# Patient Record
Sex: FEMALE | Race: WHITE | NOT HISPANIC OR LATINO | Employment: OTHER | URBAN - METROPOLITAN AREA
[De-identification: names, ages, dates, MRNs, and addresses within clinical notes are randomized per-mention and may not be internally consistent; named-entity substitution may affect disease eponyms.]

---

## 2017-10-30 ENCOUNTER — ALLSCRIPTS OFFICE VISIT (OUTPATIENT)
Dept: OTHER | Facility: OTHER | Age: 77
End: 2017-10-30

## 2017-10-30 LAB
BILIRUB UR QL STRIP: NEGATIVE
CLARITY UR: NORMAL
COLOR UR: YELLOW
GLUCOSE (HISTORICAL): NEGATIVE
HGB UR QL STRIP.AUTO: NEGATIVE
KETONES UR STRIP-MCNC: NEGATIVE MG/DL
LEUKOCYTE ESTERASE UR QL STRIP: NEGATIVE
NITRITE UR QL STRIP: NEGATIVE
PH UR STRIP.AUTO: 7.5 [PH]
PROT UR STRIP-MCNC: NEGATIVE MG/DL
SP GR UR STRIP.AUTO: 1.01
UROBILINOGEN UR QL STRIP.AUTO: 0.2

## 2017-11-13 ENCOUNTER — GENERIC CONVERSION - ENCOUNTER (OUTPATIENT)
Dept: OTHER | Facility: OTHER | Age: 77
End: 2017-11-13

## 2017-11-16 ENCOUNTER — GENERIC CONVERSION - ENCOUNTER (OUTPATIENT)
Dept: OTHER | Facility: OTHER | Age: 77
End: 2017-11-16

## 2017-11-20 ENCOUNTER — ALLSCRIPTS OFFICE VISIT (OUTPATIENT)
Dept: OTHER | Facility: OTHER | Age: 77
End: 2017-11-20

## 2017-11-20 DIAGNOSIS — M85.80 OTHER SPECIFIED DISORDERS OF BONE DENSITY AND STRUCTURE, UNSPECIFIED SITE (CODE): ICD-10-CM

## 2017-11-20 DIAGNOSIS — N89.8 OTHER SPECIFIED NONINFLAMMATORY DISORDERS OF VAGINA (CODE): ICD-10-CM

## 2017-11-21 LAB
BACTERIA UR QL AUTO: NEGATIVE
CLUE CELL (HISTORICAL): NEGATIVE
HYPHAL YEAST (HISTORICAL): NEGATIVE
KOH PREP (HISTORICAL): NEGATIVE
TRICHOMONAS (HISTORICAL): NEGATIVE
YEAST (HISTORICAL): NEGATIVE

## 2017-11-22 ENCOUNTER — GENERIC CONVERSION - ENCOUNTER (OUTPATIENT)
Dept: OTHER | Facility: OTHER | Age: 77
End: 2017-11-22

## 2017-11-22 NOTE — PROGRESS NOTES
Assessment    1  Vaginal discharge (623 5) (N89 8)   2  Osteopenia (733 90) (M85 80)    Plan  Osteopenia    · * DXA BONE DENSITY SPINE HIP AND PELVIS; Status:Active; Requested for:20Nov2017;   Perform:[facility]; WXC:[id number]; Last Updated By:Elza Fitzgerald; 11/20/2017 3:20:00 PM;Ordered;Ordered By:Kemal Escalera;  Vaginal discharge    · * US PELVIS COMPLETE (TRANSABDOMINAL AND TRANSVAGINAL); Status:Active; Requested for:20Nov2017;    Perform:[facility]; AQO:[id number]; Last Updated By:Elza Fitzgerald; 11/20/2017 3:18:20 PM;Ordered; For:Vaginal discharge; Ordered By:Christina Escalera;   · 97 Susanna Ochoa; Status:Complete;   Done: 20FAT1578 09:59PM   Performed: In Office; KDW:53QVB3493;TAVFXRS; Today;discharge; Ordered By:Christina Escalera;    Discussion/Summary  Discussion Summary:   Vaginal discharge-no sign of infection, she denies any bleeding but the nurse note states that she did have bleeding, will check pelvic ultrasound to see what her endometrium measures  is due for a DEXA this was ordered  Goals and Barriers: The patient has the current Goals: See discussion  The patent has the current Barriers: None  History of Present Illness  HPI: Pt here for evaluation of discharge, it started a few weeks ago and it was clear or yellow, no vaginal bleeding,denies that it was pink, tan or brown, she denies any vaginal odor or itching  Today she has no discharge  A little over a year ago she had an ultrasound for bloating and her endometrium was 5 3 mm and there was endometrial fluid noted  An endometrial biopsy was done and showed scantStrips of superficial inactive endometrium, it was a scant biopsy  She was not having bleeding  Review of Systems  Focused-Female:  Constitutional: No fever, no chills, feels well, no tiredness, no recent weight gain or loss  ENT: no ear ache, no loss of hearing, no nosebleeds or nasal discharge, no sore throat or hoarseness    Cardiovascular: no complaints of slow or fast heart rate, no chest pain, no palpitations, no leg claudication or lower extremity edema  Respiratory: no complaints of shortness of breath, no wheezing, no dyspnea on exertion, no orthopnea or PND  Breasts: no complaints of breast pain, breast lump or nipple discharge  Gastrointestinal: no complaints of abdominal pain, no constipation, no nausea or diarrhea, no vomiting, no bloody stools  Genitourinary: no complaints of dysuria, no incontinence, no pelvic pain, no dysmenorrhea, no vaginal discharge or abnormal vaginal bleeding  Musculoskeletal: no complaints of arthralgia, no myalgia, no joint swelling or stiffness, no limb pain or swelling  Integumentary: no complaints of skin rash or lesion, no itching or dry skin, no skin wounds  Neurological: no complaints of headache, no confusion, no numbness or tingling, no dizziness or fainting  ROS Reviewed:   ROS reviewed  Active Problems  1  Bloating (787 3) (R14 0)   2  Encounter for screening for malignant neoplasm of cervix (V76 2) (Z12 4)   3  Denied: History of self breast exam   4  Menopause (627 2) (Z78 0)   5  Osteopenia (733 90) (M85 80)   6  Thickened endometrium (793 5) (R93 8)   7  Urgency of urination (788 63) (R39 15)   8  Visit for routine gyn exam ( 31) (Z01 419)   9  Visit for screening mammogram (V76 12) (Z12 31)    Past Medical History  1  History of  1 (V22 0) (Z34 00)   2  Denied: History of self breast exam  Active Problems And Past Medical History Reviewed: The active problems and past medical history were reviewed and updated today  Surgical History  1  History of Appendectomy  Surgical History Reviewed: The surgical history was reviewed and updated today  Family History  Mother    1  Family history of diabetes mellitus (V18 0) (Z83 3)  Father    2  Family history of Alzheimer's disease (V17 2) (Z82 0)  Brother    3  Family history of malignant neoplasm (V16 9) (Z80 9)  Family History Reviewed:    The family history was reviewed and updated today  Social History     · Denied: History of Alcohol use   · Always uses seat belt   · Caffeine use (V49 89) (F15 90)   · Denied: History of Drug use   ·    · Yazidi   · Never a smoker   · One child  Social History Reviewed: The social history was reviewed and updated today  Current Meds   1  Alendronate Sodium 35 MG Oral Tablet; TAKE 1 TABLET WEEKLY ON AN EMPTY STOMACH 30-60 MINUTES BEFORE BREAKFAST WITH 8-12 OUNCES OF WATER  DO NOT LIE DOWN AFTER TAKING PILL; Therapy: 68SBZ4703 to (Janneth Gutierrez)  Requested for: 74SCS5317; Last Rx:09Nov2017 Ordered   2  AmLODIPine Besylate 5 MG Oral Tablet; Therapy: (Recorded:30Oct2017) to Recorded   3  Calcium + D CHEW; Therapy: (XFIZCZPH:48MGW8519) to Recorded   4  Depakote 500 MG Oral Tablet Delayed Release; Therapy: (Recorded:01Oct2015) to Recorded   5  Diphenoxylate-Atropine 2 5-0 025 MG Oral Tablet; Therapy: [phone]) to Recorded   6  Folic Acid 1 MG Oral Tablet; Therapy: (Recorded:30Oct2017) to Recorded   7  Furosemide 20 MG Oral Tablet; Therapy: [phone]) to Recorded   8  LORazepam 0 5 MG Oral Tablet; Therapy: (Recorded:30Oct2017) to Recorded   9  Melatonin 5 MG Oral Tablet; Therapy: [phone]) to Recorded   10  Metoprolol Tartrate 50 MG Oral Tablet; Therapy: [phone] [phone]) to Recorded   11  Oxybutynin Chloride ER 10 MG Oral Tablet Extended Release 24 Hour; Take 1 tablet  daily; Therapy: 27ZYN6660 to [phone])  Requested for: 63RJM9101; Last  Rx:30Oct2017 Ordered   12  Oxycodone-Acetaminophen 5-325 MG Oral Tablet; Therapy: (Recorded:30Oct2017) to Recorded   13  Synthroid 50 MCG Oral Tablet; Therapy: [phone] [phone]) to Recorded   14  Temazepam 15 MG Oral Capsule; Therapy: [phone] [phone]) to Recorded   15  Vitamin D 1000 UNIT Oral Tablet; Therapy: [phone] 2882-8049) to Recorded  Medication List Reviewed:    The medication list was reviewed and updated today  Allergies  1  Penicillins    Vitals  Vital Signs    Recorded: 42DXP1941 20:51CA   Systolic 165, LUE, Sitting   Diastolic 62, LUE, Sitting   Height 5 ft 4 in   Weight 126 lb 8 oz   BMI Calculated 21 71   BSA Calculated 1 61   LMP POSTMENOPAUSAL       Physical Exam   Genitourinary  External genitalia: Normal and no lesions appreciated  Vagina: Normal, no lesions or dryness appreciated  -- No discharge noted  Urethra: Normal    Urethral meatus: Normal    Bladder: Normal, soft, non-tender and no prolapse or masses appreciated  Cervix: Normal, no palpable masses  Uterus: Normal, non-tender, not enlarged, and no palpable masses  Adnexa/parametria: Normal, non-tender and no fullness or masses appreciated  Future Appointments    Date/Time Provider Specialty Site   12/21/2017 02:00 PM Deja Singletary, [address] [place] [address]       Signatures   Electronically signed by :  Tamica David DO; Nov 21 2017 10:02PM EST                       (Author)

## 2017-12-07 ENCOUNTER — ALLSCRIPTS OFFICE VISIT (OUTPATIENT)
Dept: OTHER | Facility: OTHER | Age: 77
End: 2017-12-07

## 2017-12-07 LAB
BILIRUB UR QL STRIP: NORMAL
CLARITY UR: NORMAL
COLOR UR: YELLOW
GLUCOSE (HISTORICAL): NORMAL
HGB UR QL STRIP.AUTO: NORMAL
KETONES UR STRIP-MCNC: NORMAL MG/DL
LEUKOCYTE ESTERASE UR QL STRIP: NORMAL
NITRITE UR QL STRIP: NORMAL
PH UR STRIP.AUTO: 7 [PH]
PROT UR STRIP-MCNC: NORMAL MG/DL
SP GR UR STRIP.AUTO: 1.02
UROBILINOGEN UR QL STRIP.AUTO: 0.2

## 2017-12-18 ENCOUNTER — ALLSCRIPTS OFFICE VISIT (OUTPATIENT)
Dept: OTHER | Facility: OTHER | Age: 77
End: 2017-12-18

## 2018-01-12 NOTE — MISCELLANEOUS
Message   Date: 26 May 2016 11:42 AM EST, Recorded By: Melinda Roberts For: Georgina Mcnamara, Self   Phone: [phone] (Home)   Reason: Other   Pt talked with Twyla Henley    her call was lost, but Twyla Henley said that pt wanted a written paper with her dx on it and what she had    had Twyla Henley approve it and it was sent to pts house and scanned in case pt loses it again           Active Problems    1  Bloating (787 3) (R14 0)   2  Encounter for screening for malignant neoplasm of cervix (V76 2) (Z12 4)   3  Denied: History of self breast exam   4  Menopause (627 2) (Z78 0)   5  Osteopenia (733 90) (M85 80)   6  Visit for routine gyn exam (V72 31) (Z01 419)   7  Visit for screening mammogram (V76 12) (Z12 31)    Current Meds   1  Alendronate Sodium 35 MG Oral Tablet; TAKE 1 TABLET WEEKLY ON AN EMPTY   STOMACH 30-60 MINUTES BEFORE BREAKFAST WITH 8-12 OUNCES OF WATER  DO NOT LIE DOWN AFTER TAKING PILL; Therapy: 61NGU0645 to (Evaluate:09Jan2017)  Requested for: 91OUA6560; Last   Rx:15Jan2016 Ordered   2  Calcium + D CHEW;   Therapy: (Recorded:01Oct2015) to Recorded   3  Depakote 250 MG Oral Tablet Delayed Release (Divalproex Sodium); Therapy: (Recorded:01Oct2015) to Recorded   4  Depakote 500 MG Oral Tablet Delayed Release (Divalproex Sodium); Therapy: (Recorded:01Oct2015) to Recorded   5  Diphenoxylate-Atropine 2 5-0 025 MG Oral Tablet; Therapy: ([phone]) to Recorded   6  Furosemide 20 MG Oral Tablet; Therapy: (691 333 981) to Recorded   7  Melatonin 5 MG Oral Tablet; Therapy: (691 333 981) to Recorded   8  Metoprolol Tartrate 50 MG Oral Tablet; Therapy: (691 333 981) to Recorded   9  Synthroid 50 MCG Oral Tablet (Levothyroxine Sodium); Therapy: (691 333 981) to Recorded   10  Temazepam 15 MG Oral Capsule; Therapy: ([phone]) to Recorded   11  Vitamin D 1000 UNIT Oral Tablet;     Therapy: (Recorded:01Oct2015) to Recorded    Allergies    1   Penicillins    Signatures   Electronically signed by : Elle Meza, ; May 26 2016 11:43AM EST                       (Author)

## 2018-01-12 NOTE — MISCELLANEOUS
Message   Recorded as Task   Date: 10/02/2016 01:48 PM, Created By: Kim Cabrales   Task Name: Go to Result   Assigned To: Rafy Mason   Regarding Patient: Crispin Rowland, Status: Active   CommentGlennette Pean - 02 Oct 2016 1:48 PM     TASK CREATED  please let pt know her emb was normal   Pt informed  Active Problems    1  Bloating (787 3) (R14 0)   2  Encounter for screening for malignant neoplasm of cervix (V76 2) (Z12 4)   3  Denied: History of self breast exam   4  Menopause (627 2) (Z78 0)   5  Osteopenia (733 90) (M85 80)   6  Thickened endometrium (793 5) (R93 8)   7  Visit for routine gyn exam (V72 31) (Z01 419)   8  Visit for screening mammogram (V76 12) (Z12 31)    Current Meds   1  Alendronate Sodium 35 MG Oral Tablet; TAKE 1 TABLET WEEKLY ON AN EMPTY   STOMACH 30-60 MINUTES BEFORE BREAKFAST WITH 8-12 OUNCES OF WATER  DO NOT LIE DOWN AFTER TAKING PILL; Therapy: 56LDZ5827 to (Evaluate:09Jan2017)  Requested for: 05WEW2383; Last   Rx:15Jan2016 Ordered   2  Calcium + D CHEW;   Therapy: (Recorded:01Oct2015) to Recorded   3  Depakote 250 MG Oral Tablet Delayed Release (Divalproex Sodium); Therapy: (Recorded:01Oct2015) to Recorded   4  Depakote 500 MG Oral Tablet Delayed Release (Divalproex Sodium); Therapy: (Recorded:01Oct2015) to Recorded   5  Diphenoxylate-Atropine 2 5-0 025 MG Oral Tablet; Therapy: (29-29-69-33) to Recorded   6  Furosemide 20 MG Oral Tablet; Therapy: (29-29-69-33) to Recorded   7  Melatonin 5 MG Oral Tablet; Therapy: (29-29-69-33) to Recorded   8  Metoprolol Tartrate 50 MG Oral Tablet; Therapy: (29-29-69-33) to Recorded   9  Synthroid 50 MCG Oral Tablet (Levothyroxine Sodium); Therapy: (29-29-69-33) to Recorded   10  Temazepam 15 MG Oral Capsule; Therapy: (29-29-69-33) to Recorded   11  Vitamin D 1000 UNIT Oral Tablet; Therapy: (Recorded:01Oct2015) to Recorded    Allergies    1  Penicillins    Signatures   Electronically signed by : Alessandro Aguilar, ; Oct  3 2016  9:19AM EST                       (Author)

## 2018-01-13 VITALS
DIASTOLIC BLOOD PRESSURE: 62 MMHG | BODY MASS INDEX: 21.6 KG/M2 | HEIGHT: 64 IN | WEIGHT: 126.5 LBS | SYSTOLIC BLOOD PRESSURE: 104 MMHG

## 2018-01-13 NOTE — MISCELLANEOUS
Message   Date: 22 Nov 2017 11:29 AM EST, Recorded By: Karla Worley For: Sahara Berman May, Self   Phone: [phone] (Home)   Reason: Other   per pt request  faxed over to Dr Elizabeth Soni pts mammo results    they were faxed to [phone]    Active Problems    1  Bloating (787 3) (R14 0)   2  Encounter for screening for malignant neoplasm of cervix (V76 2) (Z12 4)   3  Denied: History of self breast exam   4  Menopause (627 2) (Z78 0)   5  Osteopenia (733 90) (M85 80)   6  Thickened endometrium (793 5) (R93 8)   7  Urgency of urination (788 63) (R39 15)   8  Vaginal discharge (623 5) (N89 8)   9  Visit for routine gyn exam (V72 31) (Z01 419)   10  Visit for screening mammogram (V76 12) (Z12 31)    Current Meds   1  Alendronate Sodium 35 MG Oral Tablet; TAKE 1 TABLET WEEKLY ON AN EMPTY   STOMACH 30-60 MINUTES BEFORE BREAKFAST WITH 8-12 OUNCES OF WATER  DO NOT LIE DOWN AFTER TAKING PILL; Therapy: 63RTC5631 to (Justin Wood)  Requested for: 25FVR3835; Last   Rx:09Nov2017 Ordered   2  AmLODIPine Besylate 5 MG Oral Tablet; Therapy: (Recorded:30Oct2017) to Recorded   3  Calcium + D CHEW;   Therapy: (SKPCCVRS:75OHA8523) to Recorded   4  Depakote 500 MG Oral Tablet Delayed Release (Divalproex Sodium); Therapy: (Recorded:01Oct2015) to Recorded   5  Diphenoxylate-Atropine 2 5-0 025 MG Oral Tablet; Therapy: [phone]) to Recorded   6  Folic Acid 1 MG Oral Tablet; Therapy: (Recorded:30Oct2017) to Recorded   7  Furosemide 20 MG Oral Tablet; Therapy: [phone] [phone]) to Recorded   8  LORazepam 0 5 MG Oral Tablet; Therapy: (Recorded:30Oct2017) to Recorded   9  Melatonin 5 MG Oral Tablet; Therapy: [phone] [phone]) to Recorded   10  Metoprolol Tartrate 50 MG Oral Tablet; Therapy: [phone] [phone]) to Recorded   11  Oxybutynin Chloride ER 10 MG Oral Tablet Extended Release 24 Hour; Take 1 tablet    daily;     Therapy: 65IID1397 to (AYHTGPHP:53FOP4498)  Requested for: 13GTH5911; Last    Rx:30Oct2017 Ordered   12  Oxycodone-Acetaminophen 5-325 MG Oral Tablet; Therapy: (Recorded:30Oct2017) to Recorded   13  Synthroid 50 MCG Oral Tablet (Levothyroxine Sodium); Therapy: ([phone]) to Recorded   14  Temazepam 15 MG Oral Capsule; Therapy: ([phone]) to Recorded   15  Vitamin D 1000 UNIT Oral Tablet; Therapy: (Recorded:01Oct2015) to Recorded    Allergies    1   Penicillins    Signatures   Electronically signed by : Mariah Rodriguez, ; Nov 22 2017 11:30AM EST                       (Author)

## 2018-01-15 NOTE — MISCELLANEOUS
Message   Date: 16 Nov 2017 10:07 AM EST, Recorded By: Deo Shaw For: Tuyet Huntley, Self   Phone: [phone] (Home)   Reason: Medical Complaint   pt is having post-menopausal bleeding    pt will schedule an appt           Active Problems    1  Bloating (787 3) (R14 0)   2  Encounter for screening for malignant neoplasm of cervix (V76 2) (Z12 4)   3  Denied: History of self breast exam   4  Menopause (627 2) (Z78 0)   5  Osteopenia (733 90) (M85 80)   6  Thickened endometrium (793 5) (R93 8)   7  Urgency of urination (788 63) (R39 15)   8  Visit for routine gyn exam (V72 31) (Z01 419)   9  Visit for screening mammogram (V76 12) (Z12 31)    Current Meds   1  Alendronate Sodium 35 MG Oral Tablet; TAKE 1 TABLET WEEKLY ON AN EMPTY   STOMACH 30-60 MINUTES BEFORE BREAKFAST WITH 8-12 OUNCES OF WATER  DO NOT LIE DOWN AFTER TAKING PILL; Therapy: 04JLO0566 to (Jonny Causey)  Requested for: 22EFZ6655; Last   Rx:09Nov2017 Ordered   2  AmLODIPine Besylate 5 MG Oral Tablet; Therapy: (Recorded:30Oct2017) to Recorded   3  Calcium + D CHEW;   Therapy: (ZENJPQRD:44TEV3725) to Recorded   4  Depakote 500 MG Oral Tablet Delayed Release (Divalproex Sodium); Therapy: (Recorded:01Oct2015) to Recorded   5  Diphenoxylate-Atropine 2 5-0 025 MG Oral Tablet; Therapy: [phone] [phone]) to Recorded   6  Folic Acid 1 MG Oral Tablet; Therapy: (Recorded:05Fcu7244) to Recorded   7  Furosemide 20 MG Oral Tablet; Therapy: [phone]) to Recorded   8  LORazepam 0 5 MG Oral Tablet; Therapy: (Recorded:30Oct2017) to Recorded   9  Melatonin 5 MG Oral Tablet; Therapy: [phone] [phone]) to Recorded   10  Metoprolol Tartrate 50 MG Oral Tablet; Therapy: [phone]) to Recorded   11  Oxybutynin Chloride ER 10 MG Oral Tablet Extended Release 24 Hour; Take 1 tablet    daily;     Therapy: 63WTE7227 to [phone])  Requested for: 36VDR0555; Last Rx: 18KWJ6951 Ordered   12  Oxycodone-Acetaminophen 5-325 MG Oral Tablet; Therapy: (Recorded:30Oct2017) to Recorded   13  Synthroid 50 MCG Oral Tablet (Levothyroxine Sodium); Therapy: (867 6664) to Recorded   14  Temazepam 15 MG Oral Capsule; Therapy: ([phone]) to Recorded   15  Vitamin D 1000 UNIT Oral Tablet; Therapy: (Recorded:01Oct2015) to Recorded    Allergies    1   Penicillins    Signatures   Electronically signed by : Suhas Sanchez, ; Nov 16 2017 10:08AM EST                       (Author)

## 2018-01-16 NOTE — MISCELLANEOUS
Message   Recorded as Task   Date: 05/17/2016 09:47 PM, Created By: Preston Bentley   Task Name: Review Document   Assigned To: Rafy Mason   Regarding Patient: Crispin Rowland, Status: Active   Comment:    Preston Bentley - 17 May 2016 9:47 PM     TASK CREATED  please let pt know there is still fluid in the endometrium, her lining looks a little thicker than before, would try to attempt emb again in office   Linda Chaney - 19 May 2016 9:38 AM     TASK REPLIED TO: Previously Assigned To Tania Santana owe me for this    40 min on phone with her  Fiona Child - 19 May 2016 10:09 AM     TASK REPLIED TO: Previously Assigned To Preston Bentley  thank you        Active Problems    1  Bloating (787 3) (R14 0)   2  Encounter for screening for malignant neoplasm of cervix (V76 2) (Z12 4)   3  Denied: History of self breast exam   4  Menopause (627 2) (Z78 0)   5  Osteopenia (733 90) (M85 80)   6  Visit for routine gyn exam (V72 31) (Z01 419)   7  Visit for screening mammogram (V76 12) (Z12 31)    Current Meds   1  Alendronate Sodium 35 MG Oral Tablet; TAKE 1 TABLET WEEKLY ON AN EMPTY   STOMACH 30-60 MINUTES BEFORE BREAKFAST WITH 8-12 OUNCES OF WATER  DO NOT LIE DOWN AFTER TAKING PILL; Therapy: 97DXV0412 to (Evaluate:09Jan2017)  Requested for: 52WGP6072; Last   Rx:15Jan2016 Ordered   2  Calcium + D CHEW;   Therapy: (Recorded:01Oct2015) to Recorded   3  Depakote 250 MG Oral Tablet Delayed Release (Divalproex Sodium); Therapy: (Recorded:01Oct2015) to Recorded   4  Depakote 500 MG Oral Tablet Delayed Release (Divalproex Sodium); Therapy: (Recorded:01Oct2015) to Recorded   5  Diphenoxylate-Atropine 2 5-0 025 MG Oral Tablet; Therapy: [phone] [phone]) to Recorded   6  Furosemide 20 MG Oral Tablet; Therapy: [phone] 2996-694) to Recorded   7  Melatonin 5 MG Oral Tablet; Therapy: (([phone] 4387-896) to Recorded   8  Metoprolol Tartrate 50 MG Oral Tablet;    Therapy: (Recorded:01Oct2015) to Recorded   9  Synthroid 50 MCG Oral Tablet (Levothyroxine Sodium); Therapy: [phone]) to Recorded   10  Temazepam 15 MG Oral Capsule; Therapy: (([phone] [phone]) to Recorded   11  Vitamin D 1000 UNIT Oral Tablet; Therapy: (Recorded:01Oct2015) to Recorded    Allergies    1   Penicillins    Signatures   Electronically signed by : [facility], ; May 19 2016 11:03AM EST                       (Author)

## 2018-01-18 NOTE — MISCELLANEOUS
Message   Date: 20 Apr 2016 10:16 AM EST, Recorded By: Vance Cornejo For: Chago Ortega, Self   Phone: [phone] (Home)   Reason: Other   pt called and wanted a med called  Perphenazine removed from her chart    It is D/C from her chart    pt no longer takes this med           Active Problems    1  Bloating (787 3) (R14 0)   2  Encounter for screening for malignant neoplasm of cervix (V76 2) (Z12 4)   3  Denied: History of self breast exam   4  Menopause (627 2) (Z78 0)   5  Osteopenia (733 90) (M85 80)   6  Visit for routine gyn exam (V72 31) (Z01 419)   7  Visit for screening mammogram (V76 12) (Z12 31)    Current Meds   1  Alendronate Sodium 35 MG Oral Tablet; TAKE 1 TABLET WEEKLY ON AN EMPTY   STOMACH 30-60 MINUTES BEFORE BREAKFAST WITH 8-12 OUNCES OF WATER  DO NOT LIE DOWN AFTER TAKING PILL; Therapy: 07TFC7519 to (Evaluate:09Jan2017)  Requested for: 38UUN0384; Last   Rx:15Jan2016 Ordered   2  Calcium + D CHEW;   Therapy: (Recorded:01Oct2015) to Recorded   3  Depakote 250 MG Oral Tablet Delayed Release (Divalproex Sodium); Therapy: (Recorded:01Oct2015) to Recorded   4  Depakote 500 MG Oral Tablet Delayed Release (Divalproex Sodium); Therapy: (Recorded:01Oct2015) to Recorded   5  Diphenoxylate-Atropine 2 5-0 025 MG Oral Tablet; Therapy: [phone]) to Recorded   6  Furosemide 20 MG Oral Tablet; Therapy: [phone] [phone]) to Recorded   7  Melatonin 5 MG Oral Tablet; Therapy: (([phone] 2763-666) to Recorded   8  Metoprolol Tartrate 50 MG Oral Tablet; Therapy: [phone]) to Recorded   9  Synthroid 50 MCG Oral Tablet (Levothyroxine Sodium); Therapy: [phone] [phone]) to Recorded   10  Temazepam 15 MG Oral Capsule; Therapy: [phone] 9149-134) to Recorded   11  Vitamin D 1000 UNIT Oral Tablet; Therapy: (Recorded:01Oct2015) to Recorded    Allergies    1   Penicillins    Signatures   Electronically signed by : Jozef Beauchamp, ; Apr 20 2016 10:17AM EST                       (Author)

## 2018-01-22 VITALS
WEIGHT: 129 LBS | BODY MASS INDEX: 22.02 KG/M2 | HEIGHT: 64 IN | DIASTOLIC BLOOD PRESSURE: 64 MMHG | SYSTOLIC BLOOD PRESSURE: 120 MMHG

## 2018-01-22 VITALS
HEIGHT: 64 IN | WEIGHT: 126 LBS | BODY MASS INDEX: 21.51 KG/M2 | SYSTOLIC BLOOD PRESSURE: 106 MMHG | DIASTOLIC BLOOD PRESSURE: 60 MMHG

## 2018-01-23 VITALS
DIASTOLIC BLOOD PRESSURE: 80 MMHG | BODY MASS INDEX: 20.83 KG/M2 | WEIGHT: 122 LBS | SYSTOLIC BLOOD PRESSURE: 120 MMHG | HEIGHT: 64 IN

## 2018-07-10 ENCOUNTER — HOSPITAL ENCOUNTER (INPATIENT)
Facility: HOSPITAL | Age: 78
LOS: 16 days | Discharge: HOME | DRG: 885 | End: 2018-07-26
Attending: PSYCHIATRY & NEUROLOGY | Admitting: PSYCHIATRY & NEUROLOGY
Payer: MEDICARE

## 2018-07-10 DIAGNOSIS — J30.2 SEASONAL ALLERGIES: ICD-10-CM

## 2018-07-10 DIAGNOSIS — E53.8 FOLIC ACID DEFICIENCY DISEASE: ICD-10-CM

## 2018-07-10 DIAGNOSIS — I10 ESSENTIAL HYPERTENSION: ICD-10-CM

## 2018-07-10 DIAGNOSIS — M81.0 OSTEOPOROSIS: ICD-10-CM

## 2018-07-10 DIAGNOSIS — E03.9 HYPOTHYROIDISM: ICD-10-CM

## 2018-07-10 DIAGNOSIS — F31.9 BIPOLAR 1 DISORDER (HCC): Primary | ICD-10-CM

## 2018-07-10 DIAGNOSIS — E55.9 VITAMIN D DEFICIENCY: ICD-10-CM

## 2018-07-10 DIAGNOSIS — B35.3 FUNGAL INFECTION OF FOOT: ICD-10-CM

## 2018-07-10 PROCEDURE — 99222 1ST HOSP IP/OBS MODERATE 55: CPT | Performed by: INTERNAL MEDICINE

## 2018-07-10 RX ORDER — LORAZEPAM 0.5 MG/1
TABLET ORAL EVERY 6 HOURS PRN
COMMUNITY
End: 2018-07-26 | Stop reason: HOSPADM

## 2018-07-10 RX ORDER — DIVALPROEX SODIUM 500 MG/1
500 TABLET, DELAYED RELEASE ORAL
COMMUNITY
End: 2018-07-26 | Stop reason: HOSPADM

## 2018-07-10 RX ORDER — METOPROLOL TARTRATE 50 MG/1
25 TABLET, FILM COATED ORAL EVERY 12 HOURS SCHEDULED
COMMUNITY
End: 2018-07-26 | Stop reason: HOSPADM

## 2018-07-10 RX ORDER — FEXOFENADINE HCL 180 MG/1
180 TABLET ORAL DAILY
COMMUNITY
End: 2018-07-26 | Stop reason: HOSPADM

## 2018-07-10 RX ORDER — LANOLIN ALCOHOL/MO/W.PET/CERES
3 CREAM (GRAM) TOPICAL
Status: DISCONTINUED | OUTPATIENT
Start: 2018-07-10 | End: 2018-07-26 | Stop reason: HOSPADM

## 2018-07-10 RX ORDER — BENZTROPINE MESYLATE 1 MG/1
1 TABLET ORAL EVERY 8 HOURS PRN
Status: DISCONTINUED | OUTPATIENT
Start: 2018-07-10 | End: 2018-07-26 | Stop reason: HOSPADM

## 2018-07-10 RX ORDER — MELATONIN
1000 DAILY
Status: DISCONTINUED | OUTPATIENT
Start: 2018-07-11 | End: 2018-07-26 | Stop reason: HOSPADM

## 2018-07-10 RX ORDER — OLANZAPINE 2.5 MG/1
5 TABLET ORAL EVERY 8 HOURS PRN
COMMUNITY
End: 2018-07-26 | Stop reason: HOSPADM

## 2018-07-10 RX ORDER — FOLIC ACID 1 MG/1
TABLET ORAL DAILY
COMMUNITY
End: 2018-07-26 | Stop reason: HOSPADM

## 2018-07-10 RX ORDER — DIVALPROEX SODIUM 500 MG/1
500 TABLET, DELAYED RELEASE ORAL DAILY
Status: DISCONTINUED | OUTPATIENT
Start: 2018-07-11 | End: 2018-07-13

## 2018-07-10 RX ORDER — LORAZEPAM 1 MG/1
1 TABLET ORAL EVERY 6 HOURS PRN
Status: DISCONTINUED | OUTPATIENT
Start: 2018-07-10 | End: 2018-07-18

## 2018-07-10 RX ORDER — OXYCODONE HYDROCHLORIDE 5 MG/1
5 CAPSULE ORAL EVERY 6 HOURS PRN
COMMUNITY
End: 2018-07-26 | Stop reason: HOSPADM

## 2018-07-10 RX ORDER — TEMAZEPAM 15 MG/1
15 CAPSULE ORAL
Status: DISCONTINUED | OUTPATIENT
Start: 2018-07-10 | End: 2018-07-26 | Stop reason: HOSPADM

## 2018-07-10 RX ORDER — DIPHENOXYLATE HYDROCHLORIDE AND ATROPINE SULFATE 2.5; .025 MG/1; MG/1
1 TABLET ORAL DAILY
COMMUNITY
End: 2020-01-07 | Stop reason: ALTCHOICE

## 2018-07-10 RX ORDER — LEVOTHYROXINE SODIUM 0.03 MG/1
25 TABLET ORAL EVERY OTHER DAY
COMMUNITY
End: 2018-07-26 | Stop reason: HOSPADM

## 2018-07-10 RX ORDER — AMLODIPINE BESYLATE 5 MG/1
5 TABLET ORAL DAILY
COMMUNITY
End: 2018-07-26 | Stop reason: HOSPADM

## 2018-07-10 RX ORDER — HYDROCHLOROTHIAZIDE 25 MG/1
25 TABLET ORAL DAILY
Status: DISCONTINUED | OUTPATIENT
Start: 2018-07-11 | End: 2018-07-11

## 2018-07-10 RX ORDER — CALCIUM CARBONATE 200(500)MG
1 TABLET,CHEWABLE ORAL 2 TIMES DAILY
COMMUNITY
End: 2020-01-07 | Stop reason: ALTCHOICE

## 2018-07-10 RX ORDER — DIVALPROEX SODIUM 250 MG/1
500 TABLET, DELAYED RELEASE ORAL
COMMUNITY
End: 2018-07-26 | Stop reason: HOSPADM

## 2018-07-10 RX ORDER — LEVOTHYROXINE SODIUM 0.03 MG/1
25 TABLET ORAL EVERY OTHER DAY
Status: DISCONTINUED | OUTPATIENT
Start: 2018-07-11 | End: 2018-07-26 | Stop reason: HOSPADM

## 2018-07-10 RX ORDER — ACETAMINOPHEN 325 MG/1
650 TABLET ORAL EVERY 4 HOURS PRN
Status: DISCONTINUED | OUTPATIENT
Start: 2018-07-10 | End: 2018-07-26 | Stop reason: HOSPADM

## 2018-07-10 RX ORDER — AMLODIPINE BESYLATE 5 MG/1
5 TABLET ORAL DAILY
Status: DISCONTINUED | OUTPATIENT
Start: 2018-07-11 | End: 2018-07-26 | Stop reason: HOSPADM

## 2018-07-10 RX ORDER — LEVOTHYROXINE SODIUM 0.05 MG/1
50 TABLET ORAL EVERY OTHER DAY
COMMUNITY
End: 2018-07-26 | Stop reason: HOSPADM

## 2018-07-10 RX ORDER — FOLIC ACID 1 MG/1
1 TABLET ORAL DAILY
Status: DISCONTINUED | OUTPATIENT
Start: 2018-07-11 | End: 2018-07-26 | Stop reason: HOSPADM

## 2018-07-10 RX ORDER — TEMAZEPAM 7.5 MG/1
15 CAPSULE ORAL
COMMUNITY
End: 2018-07-26 | Stop reason: HOSPADM

## 2018-07-10 RX ORDER — LEVOTHYROXINE SODIUM 0.05 MG/1
50 TABLET ORAL EVERY OTHER DAY
Status: DISCONTINUED | OUTPATIENT
Start: 2018-07-12 | End: 2018-07-26 | Stop reason: HOSPADM

## 2018-07-10 RX ORDER — LORAZEPAM 2 MG/ML
1 INJECTION INTRAMUSCULAR EVERY 4 HOURS PRN
Status: DISCONTINUED | OUTPATIENT
Start: 2018-07-10 | End: 2018-07-18

## 2018-07-10 RX ORDER — HYDROCHLOROTHIAZIDE 25 MG/1
25 TABLET ORAL DAILY
COMMUNITY
End: 2018-07-26 | Stop reason: HOSPADM

## 2018-07-10 RX ORDER — DIVALPROEX SODIUM 500 MG/1
500 TABLET, DELAYED RELEASE ORAL
Status: DISCONTINUED | OUTPATIENT
Start: 2018-07-10 | End: 2018-07-26 | Stop reason: HOSPADM

## 2018-07-10 RX ORDER — TRAZODONE HYDROCHLORIDE 50 MG/1
50 TABLET ORAL
Status: DISCONTINUED | OUTPATIENT
Start: 2018-07-10 | End: 2018-07-26 | Stop reason: HOSPADM

## 2018-07-10 RX ORDER — MELATONIN
1000 DAILY
COMMUNITY
End: 2018-07-26 | Stop reason: HOSPADM

## 2018-07-10 RX ORDER — ALENDRONATE SODIUM 35 MG/1
35 TABLET ORAL
COMMUNITY
End: 2018-07-26 | Stop reason: HOSPADM

## 2018-07-10 RX ORDER — ACETAMINOPHEN 500 MG
500 TABLET ORAL EVERY 6 HOURS PRN
COMMUNITY
End: 2018-12-04

## 2018-07-10 RX ADMIN — DIVALPROEX SODIUM 500 MG: 500 TABLET, DELAYED RELEASE ORAL at 22:38

## 2018-07-10 RX ADMIN — MELATONIN 3 MG: 3 TAB ORAL at 22:39

## 2018-07-10 RX ADMIN — METOPROLOL TARTRATE 25 MG: 25 TABLET, FILM COATED ORAL at 22:40

## 2018-07-10 RX ADMIN — TEMAZEPAM 15 MG: 15 CAPSULE ORAL at 22:38

## 2018-07-11 PROBLEM — E87.1 HYPONATREMIA: Status: ACTIVE | Noted: 2018-07-11

## 2018-07-11 LAB
ANION GAP SERPL CALCULATED.3IONS-SCNC: 5 MMOL/L (ref 5–14)
BASOPHILS # BLD AUTO: 0.1 THOUSANDS/ΜL (ref 0–0.1)
BASOPHILS NFR BLD AUTO: 1 % (ref 0–1)
BUN SERPL-MCNC: 20 MG/DL (ref 5–25)
CALCIUM SERPL-MCNC: 8.6 MG/DL (ref 8.4–10.2)
CHLORIDE SERPL-SCNC: 92 MMOL/L (ref 97–108)
CHOLEST SERPL-MCNC: 166 MG/DL
CO2 SERPL-SCNC: 31 MMOL/L (ref 22–30)
CREAT SERPL-MCNC: 0.48 MG/DL (ref 0.6–1.2)
EOSINOPHIL # BLD AUTO: 0.2 THOUSAND/ΜL (ref 0–0.4)
EOSINOPHIL NFR BLD AUTO: 2 % (ref 0–6)
ERYTHROCYTE [DISTWIDTH] IN BLOOD BY AUTOMATED COUNT: 13.6 %
GFR SERPL CREATININE-BSD FRML MDRD: 94 ML/MIN/1.73SQ M
GLUCOSE P FAST SERPL-MCNC: 95 MG/DL (ref 70–99)
GLUCOSE SERPL-MCNC: 95 MG/DL (ref 70–99)
HCT VFR BLD AUTO: 36 % (ref 36–46)
HDLC SERPL-MCNC: 62 MG/DL (ref 40–59)
HGB BLD-MCNC: 12.2 G/DL (ref 12–16)
LDLC SERPL CALC-MCNC: 91 MG/DL
LYMPHOCYTES # BLD AUTO: 2.8 THOUSANDS/ΜL (ref 0.5–4)
LYMPHOCYTES NFR BLD AUTO: 33 % (ref 20–50)
MAGNESIUM SERPL-MCNC: 2.1 MG/DL (ref 1.6–2.3)
MCH RBC QN AUTO: 33.5 PG (ref 26–34)
MCHC RBC AUTO-ENTMCNC: 33.8 G/DL (ref 31–36)
MCV RBC AUTO: 99 FL (ref 80–100)
MONOCYTES # BLD AUTO: 1 THOUSAND/ΜL (ref 0.2–0.9)
MONOCYTES NFR BLD AUTO: 12 % (ref 1–10)
NEUTROPHILS # BLD AUTO: 4.4 THOUSANDS/ΜL (ref 1.8–7.8)
NEUTS SEG NFR BLD AUTO: 53 % (ref 45–65)
NONHDLC SERPL-MCNC: 104 MG/DL
PLATELET # BLD AUTO: 207 THOUSANDS/UL (ref 150–450)
PMV BLD AUTO: 9.5 FL (ref 8.9–12.7)
POTASSIUM SERPL-SCNC: 4.1 MMOL/L (ref 3.6–5)
RBC # BLD AUTO: 3.64 MILLION/UL (ref 4–5.2)
SODIUM SERPL-SCNC: 128 MMOL/L (ref 137–147)
TRIGL SERPL-MCNC: 63 MG/DL
WBC # BLD AUTO: 8.4 THOUSAND/UL (ref 4.5–11)

## 2018-07-11 PROCEDURE — 80048 BASIC METABOLIC PNL TOTAL CA: CPT | Performed by: PHYSICIAN ASSISTANT

## 2018-07-11 PROCEDURE — 85025 COMPLETE CBC W/AUTO DIFF WBC: CPT | Performed by: PHYSICIAN ASSISTANT

## 2018-07-11 PROCEDURE — 82746 ASSAY OF FOLIC ACID SERUM: CPT | Performed by: PSYCHIATRY & NEUROLOGY

## 2018-07-11 PROCEDURE — 80061 LIPID PANEL: CPT | Performed by: PHYSICIAN ASSISTANT

## 2018-07-11 PROCEDURE — 83735 ASSAY OF MAGNESIUM: CPT | Performed by: PHYSICIAN ASSISTANT

## 2018-07-11 PROCEDURE — 83930 ASSAY OF BLOOD OSMOLALITY: CPT | Performed by: NURSE PRACTITIONER

## 2018-07-11 PROCEDURE — 82607 VITAMIN B-12: CPT | Performed by: PSYCHIATRY & NEUROLOGY

## 2018-07-11 RX ORDER — CHLORPROMAZINE HYDROCHLORIDE 10 MG/1
10 TABLET, FILM COATED ORAL 2 TIMES DAILY
Status: DISCONTINUED | OUTPATIENT
Start: 2018-07-11 | End: 2018-07-13

## 2018-07-11 RX ADMIN — DIVALPROEX SODIUM 500 MG: 500 TABLET, DELAYED RELEASE ORAL at 21:55

## 2018-07-11 RX ADMIN — TEMAZEPAM 15 MG: 15 CAPSULE ORAL at 21:55

## 2018-07-11 RX ADMIN — LEVOTHYROXINE SODIUM 25 MCG: 25 TABLET ORAL at 06:05

## 2018-07-11 RX ADMIN — DIVALPROEX SODIUM 500 MG: 500 TABLET, DELAYED RELEASE ORAL at 09:03

## 2018-07-11 RX ADMIN — METOPROLOL TARTRATE 25 MG: 25 TABLET, FILM COATED ORAL at 09:01

## 2018-07-11 RX ADMIN — VITAMIN D, TAB 1000IU (100/BT) 1000 UNITS: 25 TAB at 09:01

## 2018-07-11 RX ADMIN — MELATONIN 3 MG: 3 TAB ORAL at 21:55

## 2018-07-11 RX ADMIN — CHLORPROMAZINE HYDROCHLORIDE 10 MG: 10 TABLET, SUGAR COATED ORAL at 17:09

## 2018-07-11 RX ADMIN — FOLIC ACID 1 MG: 1 TABLET ORAL at 09:02

## 2018-07-11 RX ADMIN — CHLORPROMAZINE HYDROCHLORIDE 10 MG: 10 TABLET, SUGAR COATED ORAL at 10:36

## 2018-07-11 RX ADMIN — METOPROLOL TARTRATE 25 MG: 25 TABLET, FILM COATED ORAL at 21:54

## 2018-07-11 RX ADMIN — B-COMPLEX W/ C & FOLIC ACID TAB 1 TABLET: TAB at 09:02

## 2018-07-11 RX ADMIN — AMLODIPINE BESYLATE 5 MG: 5 TABLET ORAL at 09:02

## 2018-07-11 NOTE — ASSESSMENT & PLAN NOTE
Admitted under psychiatry; patient known to Dr Courtney Cruz  Will restart home depakote until evaluated by psychatry

## 2018-07-11 NOTE — PROGRESS NOTES
Monitored on Q 15 minute safety checks, eyes closed and respirations noted, appears to be sleeping  No behavior issues noted

## 2018-07-11 NOTE — PROGRESS NOTES
Slept in long intervals  Voiding QS  Med compliant  Ambulates with walker , SBA, but is steady  Voicing no complaints

## 2018-07-11 NOTE — PROGRESS NOTES
Awake, alert, oriented, poor insight  Delusional, anxious, obsessive, demanding, somatic, argumentative  Medication compliant with resistance, though refused AM HCTZ  Ambulates independently through hallway with walker and steady gait  Will continue to monitor

## 2018-07-11 NOTE — PROGRESS NOTES
Pt with sodium 128  D/C'd HCTZ and placed on 1500 cc fluid restriction daily  Will check urine studies and repeat labs in AM   Pt is also on Depakote  Will continue to monitor

## 2018-07-11 NOTE — PROGRESS NOTES
Awake, alert, oriented x3  Labile, anxious, obsessive  Medication compliant  Pt with no complaints at this time  Will continue to monitor and encourage compliance
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
## 2018-07-11 NOTE — PLAN OF CARE
ANXIETY     Will report anxiety at manageable levels Progressing     By discharge: Patient will verbalize 2 strategies to deal with anxiety Progressing        BEHAVIOR     Pt/Family maintain appropriate behavior and adhere to behavioral management agreement, if implemented [place] Discharge to home or other facility with appropriate resources Progressing        PAIN - ADULT     Verbalizes/displays adequate comfort level or baseline comfort level Progressing        SAFETY ADULT     Patient will remain free of falls Progressing

## 2018-07-11 NOTE — ASSESSMENT & PLAN NOTE
HCTZ d/c'd 7/11/18 secondary to hyponatremia  Pt placed on fluid restriction 1500cc  BMP ordered for this AM and still outstanding  She is also on Depakote  Will continue to monitor  Urine studies pending  If hypovolemic pattern will d/c fluid restriction

## 2018-07-12 PROBLEM — G89.29 CHRONIC LEG PAIN: Status: ACTIVE | Noted: 2018-07-12

## 2018-07-12 PROBLEM — M79.606 CHRONIC LEG PAIN: Status: ACTIVE | Noted: 2018-07-12

## 2018-07-12 PROBLEM — R19.5 LOOSE STOOLS: Status: ACTIVE | Noted: 2018-07-12

## 2018-07-12 LAB
ANION GAP SERPL CALCULATED.3IONS-SCNC: 8 MMOL/L (ref 5–14)
BUN SERPL-MCNC: 23 MG/DL (ref 5–25)
CALCIUM SERPL-MCNC: 9.3 MG/DL (ref 8.4–10.2)
CHLORIDE SERPL-SCNC: 87 MMOL/L (ref 97–108)
CO2 SERPL-SCNC: 32 MMOL/L (ref 22–30)
CREAT SERPL-MCNC: 0.56 MG/DL (ref 0.6–1.2)
FOLATE SERPL-MCNC: >20 NG/ML (ref 3.1–17.5)
GFR SERPL CREATININE-BSD FRML MDRD: 90 ML/MIN/1.73SQ M
GLUCOSE SERPL-MCNC: 79 MG/DL (ref 70–99)
OSMOLALITY UR/SERPL-RTO: 273 MMOL/KG (ref 282–298)
OSMOLALITY UR: 492 MMOL/KG
POTASSIUM SERPL-SCNC: 4.6 MMOL/L (ref 3.6–5)
SODIUM 24H UR-SCNC: 19 MOL/L
SODIUM SERPL-SCNC: 127 MMOL/L (ref 137–147)
TSH SERPL DL<=0.05 MIU/L-ACNC: 2.71 UIU/ML (ref 0.47–4.68)
VALPROATE SERPL-MCNC: 89.4 UG/ML (ref 50–120)
VIT B12 SERPL-MCNC: 734 PG/ML (ref 100–900)

## 2018-07-12 PROCEDURE — 84300 ASSAY OF URINE SODIUM: CPT | Performed by: NURSE PRACTITIONER

## 2018-07-12 PROCEDURE — 80164 ASSAY DIPROPYLACETIC ACD TOT: CPT | Performed by: PSYCHIATRY & NEUROLOGY

## 2018-07-12 PROCEDURE — 99232 SBSQ HOSP IP/OBS MODERATE 35: CPT | Performed by: NURSE PRACTITIONER

## 2018-07-12 PROCEDURE — 84443 ASSAY THYROID STIM HORMONE: CPT | Performed by: NURSE PRACTITIONER

## 2018-07-12 PROCEDURE — 80048 BASIC METABOLIC PNL TOTAL CA: CPT | Performed by: PSYCHIATRY & NEUROLOGY

## 2018-07-12 PROCEDURE — 83935 ASSAY OF URINE OSMOLALITY: CPT | Performed by: NURSE PRACTITIONER

## 2018-07-12 RX ORDER — CALCIUM CARBONATE 200(500)MG
500 TABLET,CHEWABLE ORAL
Status: DISCONTINUED | OUTPATIENT
Start: 2018-07-13 | End: 2018-07-14

## 2018-07-12 RX ORDER — MUSCLE RUB CREAM 100; 150 MG/G; MG/G
CREAM TOPICAL 3 TIMES DAILY
Status: DISCONTINUED | OUTPATIENT
Start: 2018-07-12 | End: 2018-07-14

## 2018-07-12 RX ORDER — ALENDRONATE SODIUM 70 MG/1
35 TABLET ORAL
Status: DISCONTINUED | OUTPATIENT
Start: 2018-07-14 | End: 2018-07-12 | Stop reason: CLARIF

## 2018-07-12 RX ADMIN — FOLIC ACID 1 MG: 1 TABLET ORAL at 08:39

## 2018-07-12 RX ADMIN — DIVALPROEX SODIUM 500 MG: 500 TABLET, DELAYED RELEASE ORAL at 21:27

## 2018-07-12 RX ADMIN — CHLORPROMAZINE HYDROCHLORIDE 10 MG: 10 TABLET, SUGAR COATED ORAL at 08:39

## 2018-07-12 RX ADMIN — B-COMPLEX W/ C & FOLIC ACID TAB 1 TABLET: TAB at 08:39

## 2018-07-12 RX ADMIN — MELATONIN 3 MG: 3 TAB ORAL at 21:28

## 2018-07-12 RX ADMIN — TEMAZEPAM 15 MG: 15 CAPSULE ORAL at 21:28

## 2018-07-12 RX ADMIN — CHLORPROMAZINE HYDROCHLORIDE 10 MG: 10 TABLET, SUGAR COATED ORAL at 17:05

## 2018-07-12 RX ADMIN — VITAMIN D, TAB 1000IU (100/BT) 1000 UNITS: 25 TAB at 08:39

## 2018-07-12 RX ADMIN — DIVALPROEX SODIUM 500 MG: 500 TABLET, DELAYED RELEASE ORAL at 08:39

## 2018-07-12 RX ADMIN — AMLODIPINE BESYLATE 5 MG: 5 TABLET ORAL at 08:39

## 2018-07-12 RX ADMIN — METOPROLOL TARTRATE 25 MG: 25 TABLET, FILM COATED ORAL at 08:39

## 2018-07-12 RX ADMIN — LEVOTHYROXINE SODIUM 50 MCG: 25 TABLET ORAL at 06:19
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
## 2018-07-12 NOTE — PROGRESS NOTES
Observed in bed, bed alarm in place for safety  Currently eyes closed and respirations noted  Appears to be sleeping  Not verbalizing any complaints  Monitored on Q 15 minute safety checks

## 2018-07-12 NOTE — PROGRESS NOTES
Progress Note - Pedro Youssef 1940, 65 y o  female MRN: [id number]    Unit/Bed#: [facility] 837-85 Encounter: [id number]    Primary Care Provider: Kary Rai MD   Date and time admitted to hospital: 7/10/2018  8:47 PM        Hyponatremia   Assessment & Plan    HCTZ d/c'd 7/11/18 secondary to hyponatremia  Pt placed on fluid restriction 1500cc  BMP ordered for this AM and still outstanding  She is also on Depakote  Will continue to monitor  Urine studies pending  If hypovolemic pattern will d/c fluid restriction  Essential hypertension   Assessment & Plan    Stable on metoprolol and norvasc  HCTZ was d/c'd for hyponatremia  Loose stools   Assessment & Plan    Patient reports having loose stool this AM   She has not taken any laxatives  No fevers/chills  Will check stool cultures and heme test   Will order stool count  Hypothyroidism   Assessment & Plan    Continue levothyroxine alternating doses of 25/50mcg every other day  TSH in progress  Chronic leg pain   Assessment & Plan    Patient reports chronic leg and foot pain  States her doctor gave her a cream that worked very well, but she is unable to recall the name  She says it was a steroid cream   Will order bengay for now, and continue prn tylenol  Pain is mild to moderate and not interfering with her ability to ambulate  She has trace BLE edema  No erythema  Bipolar depression ([address] )   Assessment & Plan    Management per psych  Parkinsonian tremor [facility])   [facility] outpatient  Stable at this time, and not on any medications  * Bipolar 1 disorder [facility])   Assessment & Plan    Management per psych  VTE Pharmacologic Prophylaxis:   Pharmacologic: Patient is ambulatory  Mechanical VTE Prophylaxis in Place: No    Patient Centered Rounds: I have performed bedside rounds with nursing staff today      Discussions with Specialists or Other Care Team Provider: nursing staff    Education and Discussions with Family / Patient: Plan of care discussed with patient    Time Spent for Care: 20 minutes  More than 50% of total time spent on counseling and coordination of care as described above  Current Length of Stay: 2 day(s)    Current Patient Status: Inpatient Psych   Certification Statement: The patient will continue to require additional inpatient hospital stay due to psychiatric illness    Discharge Plan: Per primary team    Code Status: Level 1 - Full Code      Subjective:   Patient distressed about having to have blood work and provide urine sample  She is also complaining that she cant have her Allegra, but refuses Claritin  She states she had diarrhea this AM and does not take laxatives  No fevers, chills, weakness, dizziness, or light-headedness  She reports chronic leg pain and states she had been given a cream for it in the past that worked well, but unable name it  No CP, SOB, N/V, dysuria  Objective:     Vitals:   Temp (24hrs), Av 2 °F (36 2 °C), Min:97 1 °F (36 2 °C), Max:97 2 °F (36 2 °C)    HR:  [64-84] 64  Resp:  [18-20] 20  BP: (126-141)/(60-63) 141/63  SpO2:  [98 %-100 %] 100 %  Body mass index is 20 55 kg/m²  Input and Output Summary (last 24 hours): Intake/Output Summary (Last 24 hours) at 18 0926  Last data filed at 18 0846   Gross per 24 hour   Intake             1140 ml   Output                0 ml   Net             1140 ml       Physical Exam:     Physical Exam   Constitutional: She is oriented to person, place, and time  She appears well-developed and well-nourished  No distress  HENT:   Head: Normocephalic and atraumatic  Eyes: Right eye exhibits no discharge  Left eye exhibits no discharge  Neck: No JVD present  Cardiovascular: Normal rate, regular rhythm, normal heart sounds and intact distal pulses  Exam reveals no gallop and no friction rub  No murmur heard    Pulmonary/Chest: Effort normal and breath sounds normal  No respiratory distress  She has no wheezes  She has no rales  She exhibits no tenderness  Abdominal: Soft  Bowel sounds are normal  She exhibits no distension  There is no tenderness  There is no rebound and no guarding  Musculoskeletal: She exhibits edema (trace BLE edema)  Neurological: She is alert and oriented to person, place, and time  Skin: Skin is warm and dry  She is not diaphoretic  Psychiatric: She has a normal mood and affect  Additional Data:     Labs:      Results from last 7 days  Lab Units 07/11/18  0554   WBC Thousand/uL 8 40   HEMOGLOBIN g/dL 12 2   HEMATOCRIT % 36 0   PLATELETS Thousands/uL 207   NEUTROS PCT % 53   LYMPHS PCT % 33   MONOS PCT % 12*   EOS PCT % 2       Results from last 7 days  Lab Units 07/11/18  0554   SODIUM mmol/L 128*   POTASSIUM mmol/L 4 1   CHLORIDE mmol/L 92*   CO2 mmol/L 31*   BUN mg/dL 20   CREATININE mg/dL 0 48*   CALCIUM mg/dL 8 6   GLUCOSE RANDOM mg/dL 95                     * I Have Reviewed All Lab Data Listed Above  * Additional Pertinent Lab Tests Reviewed: most recent labs reviewed, and todays labs still pending      Imaging:    Imaging Reports Reviewed Today Include: none  Imaging Personally Reviewed by Myself Includes:  none    Recent Cultures (last 7 days):           Last 24 Hours Medication List:     Current Facility-Administered Medications:  acetaminophen 650 mg Oral Q4H PRN Danielle Miguel PA-C   amLODIPine 5 mg Oral Daily Juan Antonio Rios, DO   benztropine 1 mg Oral Q8H PRN Danielle Miguel PA-C   chlorproMAZINE 10 mg Oral BID Jesus Costa MD   cholecalciferol 1,000 Units Oral Daily Juan Antonio Rios, DO   divalproex sodium 500 mg Oral Daily Juan Antonio Rios, DO   divalproex sodium 500 mg Oral HS Juan Antonio Rios, DO   folic acid 1 mg Oral Daily Juan Antonio Rios, DO   levothyroxine 25 mcg Oral Every Other Day Juan Antonio Rios, DO   levothyroxine 50 mcg Oral Every Other Day Juan Antonio Rios, DO   LORazepam 1 mg Intramuscular Q4H PRN Jose Thompson PA-C   LORazepam 1 mg Oral Q6H PRN Jose Thompson PA-C   magnesium hydroxide 30 mL Oral Daily PRN Jose Thompson PA-C   melatonin 3 mg Oral HS Rodney Watters,    menthol-methyl salicylate  Apply externally TID KESHAV Jeter   metoprolol tartrate 25 mg Oral Q12H [facility] Juan Antonio Rios,    multivitamin stress formula 1 tablet Oral Daily Juan Antonio Rios,    nicotine polacrilex 2 mg Oral Q2H PRN Jose Thompson PA-C   temazepam 15 mg Oral HS Juan Antonio Rios,    traZODone 50 mg Oral HS PRN Jose Thompson PA-C        Today, Patient Was Seen By: KESHAV Hampton
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
## 2018-07-12 NOTE — ASSESSMENT & PLAN NOTE
Patient reports having loose stool this AM   She has not taken any laxatives  No fevers/chills  Will check stool cultures and heme test   Will order stool count

## 2018-07-12 NOTE — PROGRESS NOTES
07/12/18 1000   Activity/Group Checklist   Group Other (Comment)  (Art Therapy Process Group / [employer])   Attendance Attended   Attendance Duration (min) 46-60   Interactions Interacted appropriately   Affect/Mood Appropriate   Goals Achieved Able to self-disclose; Able to recieve feedback; Able to engage in interactions; Able to listen to others  (Able to engage in materials)     Patient engaged in art-making and discussion  Had trouble differentiating the colors, specifically green when coloring  Disclosed information regarding her family due to the coloring picture she worked on that sparked a memory     N Receveur AT Intern  DTjoao Pulido MPS, ATR-BC

## 2018-07-12 NOTE — PROGRESS NOTES
Psychiatry Progress Note    Subjective: Interval History     Patient continues to be hypervigilant, anxious and obsessive at times  She still is quite irritable and focused on how her  mistreats her  Patient has very poor insight into her mental health conditions  She was started on Thorazine as well as Depakote yesterday which she tolerated well without side effect  Thorazine was chosen due to her history of having adverse reactions to several  antipsychotic medications  We will continue to monitor on the recent medication changes        Current medications:    Current Facility-Administered Medications:     acetaminophen (TYLENOL) tablet 650 mg, 650 mg, Oral, Q4H PRN, Lili Del Valle PA-C    amLODIPine (NORVASC) tablet 5 mg, 5 mg, Oral, Daily, Juan Antonio Rios, DO, 5 mg at 07/11/18 0902    benztropine (COGENTIN) tablet 1 mg, 1 mg, Oral, Q8H PRN, Lili Del Valle PA-C    chlorproMAZINE (THORAZINE) tablet 10 mg, 10 mg, Oral, BID, Víctor Maynard MD, 10 mg at 07/11/18 1709    cholecalciferol (VITAMIN D3) tablet 1,000 Units, 1,000 Units, Oral, Daily, Ayden Eagles, DO, 1,000 Units at 07/11/18 0901    divalproex sodium (DEPAKOTE) EC tablet 500 mg, 500 mg, Oral, Daily, Juan Antonio Gabriel, DO, 500 mg at 07/11/18 0903    divalproex sodium (DEPAKOTE) EC tablet 500 mg, 500 mg, Oral, HS, Juan Antonio Gabriel, DO, 500 mg at 17/90/86 8596    folic acid (FOLVITE) tablet 1 mg, 1 mg, Oral, Daily, Juan Antonio Gabriel, DO, 1 mg at 07/11/18 0902    levothyroxine tablet 25 mcg, 25 mcg, Oral, Every Other Day, Juan Antonio Gabriel, DO, 25 mcg at 07/11/18 0605    levothyroxine tablet 50 mcg, 50 mcg, Oral, Every Other Day, Juan Antonio Gabriel, DO, 50 mcg at 07/12/18 0619    LORazepam (ATIVAN) 2 mg/mL injection 1 mg, 1 mg, Intramuscular, Q4H PRN, Lili Del Valle PA-C    LORazepam (ATIVAN) tablet 1 mg, 1 mg, Oral, Q6H PRN, Lili Del Valle PA-C    magnesium hydroxide (MILK OF MAGNESIA) 400 mg/5 mL oral suspension 30 mL, 30 mL, Oral, Daily PRN, Alejandra Lu PA-C    melatonin tablet 3 mg, 3 mg, Oral, HS, Juan Antonio Rios, DO, 3 mg at 07/11/18 2155    metoprolol tartrate (LOPRESSOR) tablet 25 mg, 25 mg, Oral, Q12H [facility], Juan Antonio Rios, DO, 25 mg at 07/11/18 2154    multivitamin stress formula tablet 1 tablet, 1 tablet, Oral, Daily, Mallory Rodriguez, DO, 1 tablet at 07/11/18 0902    nicotine polacrilex (NICORETTE) gum 2 mg, 2 mg, Oral, Q2H PRN, Alejandra Lu PA-C    temazepam (RESTORIL) capsule 15 mg, 15 mg, Oral, HS, Juan Antonio Gabriel, DO, 15 mg at 07/11/18 2155    traZODone (DESYREL) tablet 50 mg, 50 mg, Oral, HS PRN, Alejandra Lu PA-C      Objective:     Vital Signs:  Vitals:    07/11/18 0717 07/11/18 1530 07/11/18 2154 07/12/18 0722   BP: 127/69 131/60 126/62 141/63   BP Location: Left arm Right arm  Left arm   Pulse: 68 84 78 64   Resp: 20 18  20   Temp: 97 8 °F (36 6 °C) (!) 97 2 °F (36 2 °C)  (!) 97 1 °F (36 2 °C)   TempSrc: Temporal Temporal  Temporal   SpO2: 97% 98%  100%   Weight:       Height:             Appearance:  age appropriate and casually dressed   Behavior:  normal   Speech:  normal volume   Mood:  anxious, constricted, depressed and irritable   Affect:  constricted   Thought Process:  circumstantial and perserverative   Thought Content:  obsessions   Perceptual Disturbances: None   Risk Potential: none   Sensorium:  person, place, situation and time   Cognition:  intact   Consciousness:  alert and awake    Attention: attention span and concentration were age appropriate   Intellect: average   Insight:  limited   Judgment: limited      Motor Activity: no abnormal movements           Recent Labs:  Results Reviewed     None          I/O Past 24 hours:  I/O last 3 completed shifts: In: 1260 [P O :1260]  Out: 1 [Stool:1]  No intake/output data recorded          Assessment / Plan:     [facility]    Recommended Treatment:      Medication changes:  1) continue current medications    Non-pharmacological treatments  1) Continue with group therapy, milieu therapy and occupational therapy  Safety  1) Safety/communication plan established targeting dynamic risk factors above  Counseling / Coordination of Care    Total floor / unit time spent today 20 minutes  Greater than 50% of total time was spent with the patient and / or family counseling and / or coordination of care  A description of the counseling / coordination of care  Patient's Rights, confidentiality and exceptions to confidentiality, use of automated medical record, [facility] staff access to medical record, and consent to treatment reviewed      Breanna Wakefield PA-C

## 2018-07-12 NOTE — PROGRESS NOTES
Patient is awake, alert, oriented with poor insight  Patient is hyper-verbal  Patient continues to be Delusional, anxious, obsessive, demanding, and argumentative  Patient is meal and medication compliant except for her Filiberto-gonzalez order  Awaiting for patient to have a bowel movement to collect specimen for lab orders  Seen by KESHAV with new orders 1  Ingris 500mg QD 2  Fosamax 35mg Q weekly on 7/14  Patient denies any pain at this time  Q 15 min checks continue for safety and support

## 2018-07-12 NOTE — PROGRESS NOTES
Patient refused labs , med  Compliant , snacks offered   No complaints offered at this time, will try labs in the am  Will continue to monitor

## 2018-07-12 NOTE — PLAN OF CARE
ANXIETY     Will report anxiety at manageable levels Progressing     By discharge: Patient will verbalize 2 strategies to deal with anxiety Progressing        BEHAVIOR     Pt/Family maintain appropriate behavior and adhere to behavioral management agreement, if implemented [place] Discharge to home or other facility with appropriate resources Progressing        PAIN - ADULT     Verbalizes/displays adequate comfort level or baseline comfort level Progressing        Prexisting or High Potential for Compromised Skin Integrity     Skin integrity is maintained or improved Progressing        Prexisting or High Potential for Compromised Skin Integrity     Skin integrity is maintained or improved Progressing        SAFETY ADULT     Patient will remain free of falls Progressing

## 2018-07-12 NOTE — ASSESSMENT & PLAN NOTE
Patient reports chronic leg and foot pain  States her doctor gave her a cream that worked very well, but she is unable to recall the name  She says it was a steroid cream   Will order bengay for now, and continue prn tylenol  Pain is mild to moderate and not interfering with her ability to ambulate  She has trace BLE edema  No erythema

## 2018-07-13 LAB
25(OH)D3 SERPL-MCNC: 63.1 NG/ML (ref 30–100)
ANION GAP SERPL CALCULATED.3IONS-SCNC: 7 MMOL/L (ref 5–14)
BUN SERPL-MCNC: 25 MG/DL (ref 5–25)
CALCIUM SERPL-MCNC: 9.3 MG/DL (ref 8.4–10.2)
CHLORIDE SERPL-SCNC: 91 MMOL/L (ref 97–108)
CO2 SERPL-SCNC: 33 MMOL/L (ref 22–30)
CREAT SERPL-MCNC: 0.65 MG/DL (ref 0.6–1.2)
GFR SERPL CREATININE-BSD FRML MDRD: 85 ML/MIN/1.73SQ M
GLUCOSE P FAST SERPL-MCNC: 92 MG/DL (ref 70–99)
GLUCOSE SERPL-MCNC: 92 MG/DL (ref 70–99)
POTASSIUM SERPL-SCNC: 4.7 MMOL/L (ref 3.6–5)
SODIUM SERPL-SCNC: 131 MMOL/L (ref 137–147)

## 2018-07-13 PROCEDURE — 80048 BASIC METABOLIC PNL TOTAL CA: CPT | Performed by: NURSE PRACTITIONER

## 2018-07-13 PROCEDURE — 82306 VITAMIN D 25 HYDROXY: CPT | Performed by: PSYCHIATRY & NEUROLOGY

## 2018-07-13 PROCEDURE — 97166 OT EVAL MOD COMPLEX 45 MIN: CPT

## 2018-07-13 PROCEDURE — 97150 GROUP THERAPEUTIC PROCEDURES: CPT

## 2018-07-13 RX ORDER — CHLORPROMAZINE HYDROCHLORIDE 10 MG/1
10 TABLET, FILM COATED ORAL 3 TIMES DAILY
Status: DISCONTINUED | OUTPATIENT
Start: 2018-07-13 | End: 2018-07-16

## 2018-07-13 RX ORDER — DIVALPROEX SODIUM 250 MG/1
250 TABLET, DELAYED RELEASE ORAL DAILY
Status: DISCONTINUED | OUTPATIENT
Start: 2018-07-13 | End: 2018-07-26 | Stop reason: HOSPADM

## 2018-07-13 RX ADMIN — FOLIC ACID 1 MG: 1 TABLET ORAL at 09:22

## 2018-07-13 RX ADMIN — Medication: at 09:23

## 2018-07-13 RX ADMIN — DIVALPROEX SODIUM 250 MG: 250 TABLET, DELAYED RELEASE ORAL at 09:22

## 2018-07-13 RX ADMIN — VITAMIN D, TAB 1000IU (100/BT) 1000 UNITS: 25 TAB at 09:21

## 2018-07-13 RX ADMIN — METOPROLOL TARTRATE 25 MG: 25 TABLET, FILM COATED ORAL at 09:21

## 2018-07-13 RX ADMIN — CHLORPROMAZINE HYDROCHLORIDE 10 MG: 10 TABLET, SUGAR COATED ORAL at 21:54

## 2018-07-13 RX ADMIN — LEVOTHYROXINE SODIUM 25 MCG: 25 TABLET ORAL at 06:31

## 2018-07-13 RX ADMIN — DIVALPROEX SODIUM 500 MG: 500 TABLET, DELAYED RELEASE ORAL at 21:55

## 2018-07-13 RX ADMIN — TEMAZEPAM 15 MG: 15 CAPSULE ORAL at 21:54

## 2018-07-13 RX ADMIN — CHLORPROMAZINE HYDROCHLORIDE 10 MG: 10 TABLET, SUGAR COATED ORAL at 09:22

## 2018-07-13 RX ADMIN — CHLORPROMAZINE HYDROCHLORIDE 10 MG: 10 TABLET, SUGAR COATED ORAL at 16:37

## 2018-07-13 RX ADMIN — AMLODIPINE BESYLATE 5 MG: 5 TABLET ORAL at 09:22

## 2018-07-13 RX ADMIN — B-COMPLEX W/ C & FOLIC ACID TAB 1 TABLET: TAB at 09:22

## 2018-07-13 RX ADMIN — MELATONIN 3 MG: 3 TAB ORAL at 21:54

## 2018-07-13 NOTE — OCCUPATIONAL THERAPY NOTE
OT group therapy note  Time: 9:25-10:10       07/13/18 5129   Assessment   Assessment Betzaida Bhandari was agreeable to attend group  She was somewhat preoccupied w/her medications prior to attending group  Overall she was pleasant and cooperative  She participated in discussion and relaxation movements as able  She stated that she enjoyed the music  She identified the ocean as visual form of relaxation for her  She was pleasant and supportive of her peers and the group process  She was taken out of group by staff for blood work but did return  She was present for ~50% of session      Plan   Treatment Interventions ADL retraining   Goal Expiration Date 08/12/18   OT Frequency 5x/wk   Recommendation   OT Discharge Recommendation (continue to enocouragement investment in groups)   Morales Desir, [address]

## 2018-07-13 NOTE — PROGRESS NOTES
Psychiatry Progress Note    Subjective: Interval History     Patient had Depakote level drawn and was found to be 89 4  She continues to be hypervigilant  Somewhat bizarre at times  Still somatically preoccupied  She had 1 episode of loose stool yesterday and was concerned that there was a problem  Despite this she has only had 1 BM in the past 24 hr  The patient has been tolerating medications well without side effect  We will make a slight reduction in the Depakote due to her level being on the higher side        Current medications:    Current Facility-Administered Medications:     acetaminophen (TYLENOL) tablet 650 mg, 650 mg, Oral, Q4H PRN, Diogenes Valencia PA-C    amLODIPine (NORVASC) tablet 5 mg, 5 mg, Oral, Daily, Juan Antonio Rios, DO, 5 mg at 07/12/18 9590    benztropine (COGENTIN) tablet 1 mg, 1 mg, Oral, Q8H PRN, Diogenes Valencia PA-C    calcium carbonate (TUMS) chewable tablet 500 mg, 500 mg, Oral, After Breakfast, KESHAV Jeter    chlorproMAZINE (THORAZINE) tablet 10 mg, 10 mg, Oral, BID, Vivian Montero MD, 10 mg at 07/12/18 1705    cholecalciferol (VITAMIN D3) tablet 1,000 Units, 1,000 Units, Oral, Daily, Juan Antonio Rios, DO, 1,000 Units at 07/12/18 0839    divalproex sodium (DEPAKOTE) EC tablet 500 mg, 500 mg, Oral, Daily, Juan Antonio Gabriel, DO, 500 mg at 07/12/18 0839    divalproex sodium (DEPAKOTE) EC tablet 500 mg, 500 mg, Oral, HS, Juan Antonio Gabriel, DO, 500 mg at 29/45/87 3043    folic acid (FOLVITE) tablet 1 mg, 1 mg, Oral, Daily, Juan Antonio Gabriel, DO, 1 mg at 07/12/18 7174    levothyroxine tablet 25 mcg, 25 mcg, Oral, Every Other Day, Juan Antonio Gabriel, DO, 25 mcg at 07/13/18 0631    levothyroxine tablet 50 mcg, 50 mcg, Oral, Every Other Day, Juan Antonio Gabriel, DO, 50 mcg at 07/12/18 0619    LORazepam (ATIVAN) 2 mg/mL injection 1 mg, 1 mg, Intramuscular, Q4H PRN, Diogenes Valencia PA-C    LORazepam (ATIVAN) tablet 1 mg, 1 mg, Oral, Q6H PRN, Diogenes Valencia PA-C    magnesium hydroxide (MILK OF MAGNESIA) 400 mg/5 mL oral suspension 30 mL, 30 mL, Oral, Daily PRN, Caron Ibarra PA-C    melatonin tablet 3 mg, 3 mg, Oral, HS, Juan Antonio Gabriel, DO, 3 mg at 07/12/18 2128    menthol-methyl salicylate (BENGAY) 46-94 % cream, , Apply externally, TID, Rubi Ciminieri, CRNP    metoprolol tartrate (LOPRESSOR) tablet 25 mg, 25 mg, Oral, Q12H [facility], Juan Antonio Gabriel, DO, 25 mg at 07/12/18 7783    multivitamin stress formula tablet 1 tablet, 1 tablet, Oral, Daily, Gaile Marcos, DO, 1 tablet at 07/12/18 0839    nicotine polacrilex (NICORETTE) gum 2 mg, 2 mg, Oral, Q2H PRN, Caron Ibarra PA-C    temazepam (RESTORIL) capsule 15 mg, 15 mg, Oral, HS, Juan Antonio Rios, DO, 15 mg at 07/12/18 2128    traZODone (DESYREL) tablet 50 mg, 50 mg, Oral, HS PRN, Caron Ibarra PA-C      Objective:     Vital Signs:  Vitals:    07/11/18 2154 07/12/18 0722 07/12/18 1532 07/12/18 2037   BP: 126/62 141/63 123/56 103/57   BP Location:  Left arm Left arm Left arm   Pulse: 78 64 61 85   Resp:  20 19    Temp:  (!) 97 1 °F (36 2 °C) (!) 97 °F (36 1 °C)    TempSrc:  Temporal Temporal    SpO2:  100% 96%    Weight:       Height:             Appearance:  age appropriate and casually dressed   Behavior:  normal   Speech:  normal volume   Mood:  anxious, constricted, depressed and irritable   Affect:  constricted   Thought Process:  circumstantial and perserverative   Thought Content:  obsessions   Perceptual Disturbances: None   Risk Potential: none   Sensorium:  person, place, situation and time   Cognition:  intact   Consciousness:  alert and awake    Attention: attention span and concentration were age appropriate   Intellect: average   Insight:  limited   Judgment: limited      Motor Activity: no abnormal movements           Recent Labs:  Results Reviewed     None          I/O Past 24 hours:  I/O last 3 completed shifts: In: 1140 [P O :1140]  Out: -   No intake/output data recorded          Assessment / Plan:     Bipolar 1 disorder ([facility]    Recommended Treatment:      Medication changes:  1) decrease Depakote to 250 mg daily in the morning and keep bedtime dosing the same check a Depakote level next week    Non-pharmacological treatments  1) Continue with group therapy, milieu therapy and occupational therapy  Safety  1) Safety/communication plan established targeting dynamic risk factors above  Counseling / Coordination of Care    Total floor / unit time spent today 20 minutes  Greater than 50% of total time was spent with the patient and / or family counseling and / or coordination of care  A description of the counseling / coordination of care  Patient's Rights, confidentiality and exceptions to confidentiality, use of automated medical record, [facility] staff access to medical record, and consent to treatment reviewed      Osman Encinas PA-C
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
## 2018-07-13 NOTE — NURSING NOTE
Patient is compliant with all medications except TUMS ordered for calcium replacement and secondary to patient not getting weekly fosamax while here at the hospital   Attempted to educate patient on this but she was unable to or refused to understand the education and continued to refuse  CRNP notified and reports she had discussion with patient regarding Fosamax as well  Patient's appetite was good with patient eating 100% of both meals  No complaints of pain or discomfort  Patient denies depressive or anxiety symptoms  No other issues noted at this time
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
## 2018-07-13 NOTE — PROGRESS NOTES
Patient is awake, alert, oriented with poor insight  Patient is hyper-verbal and needy at times  Reassurance provided  Patient is meal and medication compliant except for Filiberto-gonzalez  Patient denies any pain at this time  Q 15 min checks continue for safety and support
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
## 2018-07-13 NOTE — PROGRESS NOTES
Patient visible on the unit  Patient had no bowel movement this shift  Labs changed to be collected in am due to no bowel movement  (C-diff toxin by PCR, Occult blood 1-3, Stool Enteric Bacterial Panel by PCR)   Patient continues to be hyper-verbal and anxious  Patient continues on 1500ml fluid restrictions  Urine osmolality-492 and Sodium urine 19 all WNLs  Patient due for am labs BMP and Vit D on 7/13  Q 15 min checks continue for safety and support

## 2018-07-13 NOTE — PROGRESS NOTES
Currently observed in bed with eyes closed and respirations noted, appears to be sleeping  Monitored on Q 15 minute safety  checks

## 2018-07-13 NOTE — PROGRESS NOTES
Med compliant this AM  Was sitting on toilet straining to try and have BM for testing  I told her not to strain that the test was ordered because she complained of loose BM's  If she is not having further problem Dr deleon DC the order  Pt relieved with this info, good , must have been something I ate   Can be needy , has very rigid routine for herself and doesn't like assist

## 2018-07-14 RX ORDER — FEXOFENADINE HCL 180 MG/1
180 TABLET ORAL DAILY
Status: DISCONTINUED | OUTPATIENT
Start: 2018-07-14 | End: 2018-07-26 | Stop reason: HOSPADM

## 2018-07-14 RX ORDER — TRIAMCINOLONE ACETONIDE 0.25 MG/G
CREAM TOPICAL 2 TIMES DAILY
Status: DISCONTINUED | OUTPATIENT
Start: 2018-07-14 | End: 2018-07-26 | Stop reason: HOSPADM

## 2018-07-14 RX ORDER — TRIAMCINOLONE ACETONIDE 1 MG/G
CREAM TOPICAL 2 TIMES DAILY
Status: DISCONTINUED | OUTPATIENT
Start: 2018-07-14 | End: 2018-07-14 | Stop reason: CLARIF

## 2018-07-14 RX ORDER — B-COMPLEX WITH VITAMIN C
1 TABLET ORAL 2 TIMES DAILY WITH MEALS
Status: DISCONTINUED | OUTPATIENT
Start: 2018-07-14 | End: 2018-07-26 | Stop reason: HOSPADM

## 2018-07-14 RX ADMIN — VITAMIN D, TAB 1000IU (100/BT) 1000 UNITS: 25 TAB at 08:57

## 2018-07-14 RX ADMIN — METOPROLOL TARTRATE 25 MG: 25 TABLET, FILM COATED ORAL at 23:11

## 2018-07-14 RX ADMIN — B-COMPLEX W/ C & FOLIC ACID TAB 1 TABLET: TAB at 08:57

## 2018-07-14 RX ADMIN — OYSTER SHELL CALCIUM WITH VITAMIN D 1 TABLET: 500; 200 TABLET, FILM COATED ORAL at 16:33

## 2018-07-14 RX ADMIN — MELATONIN 3 MG: 3 TAB ORAL at 23:12

## 2018-07-14 RX ADMIN — DIVALPROEX SODIUM 500 MG: 500 TABLET, DELAYED RELEASE ORAL at 23:12

## 2018-07-14 RX ADMIN — LEVOTHYROXINE SODIUM 50 MCG: 25 TABLET ORAL at 06:01

## 2018-07-14 RX ADMIN — TEMAZEPAM 15 MG: 15 CAPSULE ORAL at 23:13

## 2018-07-14 RX ADMIN — METOPROLOL TARTRATE 25 MG: 25 TABLET, FILM COATED ORAL at 08:57

## 2018-07-14 RX ADMIN — FOLIC ACID 1 MG: 1 TABLET ORAL at 08:57

## 2018-07-14 RX ADMIN — CHLORPROMAZINE HYDROCHLORIDE 10 MG: 10 TABLET, SUGAR COATED ORAL at 08:57

## 2018-07-14 RX ADMIN — AMLODIPINE BESYLATE 5 MG: 5 TABLET ORAL at 08:58

## 2018-07-14 RX ADMIN — CHLORPROMAZINE HYDROCHLORIDE 10 MG: 10 TABLET, SUGAR COATED ORAL at 16:33

## 2018-07-14 RX ADMIN — DIVALPROEX SODIUM 250 MG: 250 TABLET, DELAYED RELEASE ORAL at 08:57

## 2018-07-14 RX ADMIN — TRIAMCINOLONE ACETONIDE: 0.25 CREAM TOPICAL at 13:43

## 2018-07-14 NOTE — PROGRESS NOTES
Pt awake, alert, oriented x3, poor insight  Med and meal compliant, however resistant to accepting Thorazine, stating "Dr Grace Muniz can shove it up his ass!" Laughing inappropriately, manic like at times  Accepted medications, refused Tums and Babs  Fixated on her meds and sodium level  Visible on the unit, intrusive at times, labile moods  Will continue to monitor and encourage

## 2018-07-14 NOTE — PROGRESS NOTES
07/14/18 0900   Activity/Group Checklist   Group Other (Comment)  (Art Therapy Process Group / Open Choice)   Attendance Attended   Attendance Duration (min) 46-60   Interactions Interacted appropriately   Affect/Mood (labile)   Goals Achieved Identified feelings; Able to listen to others; Able to engage in interactions; Able to self-disclose; Able to recieve feedback; Able to give feedback to another     Patient was verbally engaged in group, however was labile when discussing her , feelings of loneliness, and being at the hospital    [address] [facility]
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
## 2018-07-14 NOTE — PROGRESS NOTES
Patient is visible on the unit  Patient had periods of manic episodes this shift  Patient laughing uncontrollably at times  Patient compliant with medications except Filiberto-gonzalez  Patient obsessed about her Fosamax not ordered  Will pass with report for MD to address in am  Patient currently resting in bed at this time  Q15 min checks continue for safety and support

## 2018-07-14 NOTE — PLAN OF CARE
ANXIETY     Will report anxiety at manageable levels Not Progressing     By discharge: Patient will verbalize 2 strategies to deal with anxiety Not Progressing        BEHAVIOR     Pt/Family maintain appropriate behavior and adhere to behavioral management agreement, if implemented [address] Discharge to home or other facility with appropriate resources Progressing        PAIN - ADULT     Verbalizes/displays adequate comfort level or baseline comfort level Progressing        Prexisting or High Potential for Compromised Skin Integrity     Skin integrity is maintained or improved Progressing        Prexisting or High Potential for Compromised Skin Integrity     Skin integrity is maintained or improved Progressing        SAFETY ADULT     Patient will remain free of falls Progressing

## 2018-07-15 RX ADMIN — MELATONIN 3 MG: 3 TAB ORAL at 21:49

## 2018-07-15 RX ADMIN — FEXOFENADINE HCL 180 MG: 180 TABLET ORAL at 09:53

## 2018-07-15 RX ADMIN — OYSTER SHELL CALCIUM WITH VITAMIN D 1 TABLET: 500; 200 TABLET, FILM COATED ORAL at 16:13

## 2018-07-15 RX ADMIN — CHLORPROMAZINE HYDROCHLORIDE 10 MG: 10 TABLET, SUGAR COATED ORAL at 08:34

## 2018-07-15 RX ADMIN — DIVALPROEX SODIUM 500 MG: 500 TABLET, DELAYED RELEASE ORAL at 21:50

## 2018-07-15 RX ADMIN — OYSTER SHELL CALCIUM WITH VITAMIN D 1 TABLET: 500; 200 TABLET, FILM COATED ORAL at 08:33

## 2018-07-15 RX ADMIN — METOPROLOL TARTRATE 25 MG: 25 TABLET, FILM COATED ORAL at 08:34

## 2018-07-15 RX ADMIN — LORAZEPAM 1 MG: 1 TABLET ORAL at 13:30

## 2018-07-15 RX ADMIN — CHLORPROMAZINE HYDROCHLORIDE 10 MG: 10 TABLET, SUGAR COATED ORAL at 16:14

## 2018-07-15 RX ADMIN — FOLIC ACID 1 MG: 1 TABLET ORAL at 08:34

## 2018-07-15 RX ADMIN — LEVOTHYROXINE SODIUM 25 MCG: 25 TABLET ORAL at 06:02

## 2018-07-15 RX ADMIN — VITAMIN D, TAB 1000IU (100/BT) 1000 UNITS: 25 TAB at 08:34

## 2018-07-15 RX ADMIN — DIVALPROEX SODIUM 250 MG: 250 TABLET, DELAYED RELEASE ORAL at 08:34

## 2018-07-15 RX ADMIN — B-COMPLEX W/ C & FOLIC ACID TAB 1 TABLET: TAB at 08:34

## 2018-07-15 RX ADMIN — METOPROLOL TARTRATE 25 MG: 25 TABLET, FILM COATED ORAL at 21:49

## 2018-07-15 RX ADMIN — TEMAZEPAM 15 MG: 15 CAPSULE ORAL at 21:49

## 2018-07-15 RX ADMIN — AMLODIPINE BESYLATE 5 MG: 5 TABLET ORAL at 08:34

## 2018-07-15 NOTE — PROGRESS NOTES
Pt awake, alert, oriented x3, poor insight and judgment, continues with manic like behaviors, hyperverbal, laughing inappropriately  Accepted medications whole with water, refused her cream, requesting her allergy pill now instead of scheduled 1345  Pt's TEDS applied per pt request, edema noted on BILE  Pt remains visible, intrusive at times, ambulates with a walker  Will continue to monitor and encourage behavior control
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
## 2018-07-15 NOTE — PROGRESS NOTES
Patient is ambulatory with a walker and visible on the unit  Patient is pleasant and cooperative with medications and meals  Patient's mood is liable  Will continue to monitor patient for safety and support

## 2018-07-15 NOTE — PROGRESS NOTES
Pt states "At home I used to take, I think, Ativan, twice a day" and then starts laughing uncontrollably, PRN Ativan 1mg offered and accepted at 1330  Pt hyperverbal, racing thoughts, laughing inappropriately  Will monitor the effectiveness

## 2018-07-15 NOTE — NURSING NOTE
Patient preoccupied with her evening routine  Evening pills appear overdue because patient is not ready to take them  She stated that she will let nurse know when she is ready  Will continue to monitor

## 2018-07-15 NOTE — PLAN OF CARE
ANXIETY     Will report anxiety at manageable levels Not Progressing        BEHAVIOR     Pt/Family maintain appropriate behavior and adhere to behavioral management agreement, if implemented Not Progressing          ANXIETY     By discharge: Patient will verbalize 2 strategies to deal with anxiety Progressing        DISCHARGE PLANNING     Discharge to home or other facility with appropriate resources Progressing        PAIN - ADULT     Verbalizes/displays adequate comfort level or baseline comfort level Progressing        Prexisting or High Potential for Compromised Skin Integrity     Skin integrity is maintained or improved Progressing        Prexisting or High Potential for Compromised Skin Integrity     Skin integrity is maintained or improved Progressing        SAFETY ADULT     Patient will remain free of falls Progressing

## 2018-07-16 PROBLEM — J30.2 SEASONAL ALLERGIES: Status: ACTIVE | Noted: 2018-07-16

## 2018-07-16 PROBLEM — M81.0 AGE-RELATED OSTEOPOROSIS WITHOUT CURRENT PATHOLOGICAL FRACTURE: Status: ACTIVE | Noted: 2018-07-16

## 2018-07-16 PROBLEM — H04.123 BILATERAL DRY EYES: Status: ACTIVE | Noted: 2018-07-16

## 2018-07-16 PROCEDURE — 99232 SBSQ HOSP IP/OBS MODERATE 35: CPT | Performed by: NURSE PRACTITIONER

## 2018-07-16 PROCEDURE — 97535 SELF CARE MNGMENT TRAINING: CPT

## 2018-07-16 PROCEDURE — 97530 THERAPEUTIC ACTIVITIES: CPT

## 2018-07-16 RX ORDER — AMOXICILLIN 250 MG
1 CAPSULE ORAL 2 TIMES DAILY
Status: DISCONTINUED | OUTPATIENT
Start: 2018-07-16 | End: 2018-07-24

## 2018-07-16 RX ORDER — POLYVINYL ALCOHOL 14 MG/ML
1 SOLUTION/ DROPS OPHTHALMIC
Status: DISCONTINUED | OUTPATIENT
Start: 2018-07-16 | End: 2018-07-19

## 2018-07-16 RX ORDER — CHLORPROMAZINE HYDROCHLORIDE 25 MG/1
25 TABLET, FILM COATED ORAL 2 TIMES DAILY
Status: DISCONTINUED | OUTPATIENT
Start: 2018-07-16 | End: 2018-07-17

## 2018-07-16 RX ADMIN — FEXOFENADINE HCL 180 MG: 180 TABLET ORAL at 12:55

## 2018-07-16 RX ADMIN — METOPROLOL TARTRATE 25 MG: 25 TABLET, FILM COATED ORAL at 09:18

## 2018-07-16 RX ADMIN — METOPROLOL TARTRATE 25 MG: 25 TABLET, FILM COATED ORAL at 21:53

## 2018-07-16 RX ADMIN — AMLODIPINE BESYLATE 5 MG: 5 TABLET ORAL at 09:16

## 2018-07-16 RX ADMIN — B-COMPLEX W/ C & FOLIC ACID TAB 1 TABLET: TAB at 09:17

## 2018-07-16 RX ADMIN — OYSTER SHELL CALCIUM WITH VITAMIN D 1 TABLET: 500; 200 TABLET, FILM COATED ORAL at 09:16

## 2018-07-16 RX ADMIN — LEVOTHYROXINE SODIUM 50 MCG: 25 TABLET ORAL at 06:20

## 2018-07-16 RX ADMIN — TRIAMCINOLONE ACETONIDE: 0.25 CREAM TOPICAL at 09:22

## 2018-07-16 RX ADMIN — MELATONIN 3 MG: 3 TAB ORAL at 21:53

## 2018-07-16 RX ADMIN — OYSTER SHELL CALCIUM WITH VITAMIN D 1 TABLET: 500; 200 TABLET, FILM COATED ORAL at 17:35

## 2018-07-16 RX ADMIN — FOLIC ACID 1 MG: 1 TABLET ORAL at 09:16

## 2018-07-16 RX ADMIN — SENNOSIDES AND DOCUSATE SODIUM 1 TABLET: 8.6; 5 TABLET ORAL at 17:35

## 2018-07-16 RX ADMIN — CHLORPROMAZINE HYDROCHLORIDE 25 MG: 25 TABLET, SUGAR COATED ORAL at 17:35

## 2018-07-16 RX ADMIN — VITAMIN D, TAB 1000IU (100/BT) 1000 UNITS: 25 TAB at 09:17

## 2018-07-16 RX ADMIN — DIVALPROEX SODIUM 250 MG: 250 TABLET, DELAYED RELEASE ORAL at 09:16

## 2018-07-16 RX ADMIN — TRIAMCINOLONE ACETONIDE: 0.25 CREAM TOPICAL at 17:30

## 2018-07-16 RX ADMIN — DIVALPROEX SODIUM 500 MG: 500 TABLET, DELAYED RELEASE ORAL at 21:52

## 2018-07-16 RX ADMIN — TEMAZEPAM 15 MG: 15 CAPSULE ORAL at 21:52

## 2018-07-16 NOTE — PLAN OF CARE
Problem: SAFETY ADULT  Goal: Patient will remain free of falls  INTERVENTIONS:  - Assess patient frequently for physical needs  -  Identify cognitive and physical deficits and behaviors that affect risk of falls  -  [place] fall precautions as indicated by assessment   - Educate patient/family on patient safety including physical limitations  - Instruct patient to call for assistance with activity based on assessment  - Modify environment to reduce risk of injury  - Consider OT/PT consult to assist with strengthening/mobility   Outcome: Progressing      Problem: DISCHARGE PLANNING  Goal: Discharge to home or other facility with appropriate resources  INTERVENTIONS:  - Identify barriers to discharge w/patient and caregiver  - Arrange for needed discharge resources and transportation as appropriate  - Identify discharge learning needs (meds, wound care, etc )  - Arrange for interpretive services to assist at discharge as needed  - Refer to Case Management Department for coordinating discharge planning if the patient needs post-hospital services based on physician/advanced practitioner order or complex needs related to functional status, cognitive ability, or social support system   Outcome: Progressing      Problem: BEHAVIOR  Goal: Pt/Family maintain appropriate behavior and adhere to behavioral management agreement, if implemented  INTERVENTIONS:  - Assess the family dynamic   - Encourage verbalization of thoughts and concerns in a socially appropriate manner  - Assess patient/family's coping skills and non-compliant behavior (including use of illegal substances)  - Utilize positive, consistent limit setting strategies supporting safety of patient, staff and others  - Initiate consult with Case Management, Spiritual Care or other ancillary services as appropriate  - If a patient's/visitor's behavior jeopardizes the safety of the patient, staff, or others, refer to organization procedure     - Notify Security of behavior or suspected illegal substances which indicate the need for search of the patient and/or belongings  - Encourage participation in the decision making process about a behavioral management agreement; implement if patient meets criteria   Outcome: Progressing      Problem: ANXIETY  Goal: Will report anxiety at manageable levels  INTERVENTIONS:  - Administer medication as ordered  - Teach and encourage coping skills  - Provide emotional support  - Assess patient/family for anxiety and ability to cope   Outcome: Progressing    Goal: By discharge: Patient will verbalize 2 strategies to deal with anxiety  Interventions:  - Identify any obvious source/trigger to anxiety  - Staff will assist patient in applying identified coping technique/skills  - Encourage attendance of scheduled groups and activities   Outcome: Progressing      Problem: Prexisting or High Potential for Compromised Skin Integrity  Goal: Skin integrity is maintained or improved  INTERVENTIONS:  - Identify patients at risk for skin breakdown  - Assess and monitor skin integrity  - Assess and monitor nutrition and hydration status  - Monitor labs (i e  albumin)  - Assess for incontinence   - Turn and reposition patient  - Assist with mobility/ambulation  - Relieve pressure over bony prominences  - Avoid friction and shearing  - Provide appropriate hygiene as needed including keeping skin clean and dry  - Evaluate need for skin moisturizer/barrier cream  - Collaborate with interdisciplinary team (i e  Nutrition, Rehabilitation, etc )   - Patient/family teaching   Outcome: Progressing      Problem: Prexisting or High Potential for Compromised Skin Integrity  Goal: Skin integrity is maintained or improved  INTERVENTIONS:  - Identify patients at risk for skin breakdown  - Assess and monitor skin integrity  - Assess and monitor nutrition and hydration status  - Monitor labs (i e  albumin)  - Assess for incontinence   - Turn and reposition patient  - Assist with mobility/ambulation  - Relieve pressure over bony prominences  - Avoid friction and shearing  - Provide appropriate hygiene as needed including keeping skin clean and dry  - Evaluate need for skin moisturizer/barrier cream  - Collaborate with interdisciplinary team (i e  Nutrition, Rehabilitation, etc )   - Patient/family teaching   Outcome: Progressing

## 2018-07-16 NOTE — PROGRESS NOTES
Patient very needy , repetitive, wants to see dermatologist , many request put in this am, laughing uncontrollably   Hyper verbal   med and meal compliant  Encouraged with meals and fluids  Will continue to monitor
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
## 2018-07-16 NOTE — ASSESSMENT & PLAN NOTE
Patient reports chronic leg and foot pain  She has prn tylenol and triamcinolone cream that was previously prescribed outpatient for rash  Erythema noted on toes, but otherwise no rash observed
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
## 2018-07-16 NOTE — PROGRESS NOTES
Progress Note - Cale Don 1940, 65 y o  female MRN: [id number]    Unit/Bed#: [facility] 099-60 Encounter: [id number]    Primary Care Provider: Obey Delgado MD   Date and time admitted to hospital: 7/10/2018  8:47 PM        Hyponatremia   Assessment & Plan    HCTZ d/c'd 7/11/18 secondary to hyponatremia  Will repeat labs in AM and continue to monitor  Urine studies completed  Likely 2/2 HCTZ  Also on depakote  Essential hypertension   Assessment & Plan    Stable on metoprolol and norvasc  HCTZ was d/c'd for hyponatremia  Loose stools   Assessment & Plan    Resolved        Hypothyroidism   Assessment & Plan    Continue levothyroxine alternating doses of 25/50mcg every other day  TSH was wnl  Chronic leg pain   Assessment & Plan    Patient reports chronic leg and foot pain  She has prn tylenol and triamcinolone cream that was previously prescribed outpatient for rash  Erythema noted on toes, but otherwise no rash observed  Age-related osteoporosis without current pathological fracture   Assessment & Plan    Takes fosamax every Saturday, however, unable to administer per hospital policy  She is on calcium and vitamin D         Bilateral dry eyes   Assessment & Plan    Artificial tears ordered  Seasonal allergies   Assessment & Plan    Pt takes Allegra, and is refusing substitution  Bipolar depression ([facility] )   Assessment & Plan    Management per psych  Parkinsonian tremor [facility])   Keri Jenkins Domingo outpatient  Stable at this time, and not on any medications  VTE Pharmacologic Prophylaxis:   Pharmacologic: Pt is ambulatory  Mechanical VTE Prophylaxis in Place: No    Patient Centered Rounds: I have performed bedside rounds with nursing staff today  Discussions with Specialists or Other Care Team Provider: nursing    Education and Discussions with Family / Patient: Plan of care discussed extensively with pt      Time Spent for Care: 20 minutes  More than 50% of total time spent on counseling and coordination of care as described above  Current Length of Stay: 6 day(s)    Current Patient Status: Inpatient Psych   Certification Statement: The patient will continue to require additional inpatient hospital stay due to psychiatric illness    Discharge Plan: Per primary team when stable    Code Status: Level 1 - Full Code      Subjective:   Patient continues to follow me around and stand over me at the nurse's station, demanding to see me  She has even walked in another patient's room to try to get my attention  She is extremely anxious, and fixated on her medications that were reviewed with her multiple times  Also very attention seeking and argumentative  She denies CP, SOB, N/V/D, constipation, dysuria  She wanted her previously prescribed steroid cream for her feet and legs, which has been prescribed  She wants Allegra, but refuses the substitution  She wants fosamax, but due to hospital policy, this cannot be prescribed  She is on calcium  She states she is losing weight and needs Ensure - this was ordered, as were QOD weights  She continues to demand to be seen, and repeats herself  Does not respect boundaries  Crying and yelling inappropriately in the hallway  Objective:     Vitals:   Temp (24hrs), Av 9 °F (36 1 °C), Min:96 9 °F (36 1 °C), Max:96 9 °F (36 1 °C)    HR:  [61-70] 61  Resp:  [17] 17  BP: (119-136)/(59-66) 120/66  SpO2:  [97 %] 97 %  Body mass index is 20 55 kg/m²  Input and Output Summary (last 24 hours): Intake/Output Summary (Last 24 hours) at 18 1600  Last data filed at 18 1224   Gross per 24 hour   Intake             1100 ml   Output                0 ml   Net             1100 ml       Physical Exam:     Physical Exam   Constitutional: She is oriented to person, place, and time  She appears well-developed and well-nourished  No distress     HENT:   Head: Normocephalic and atraumatic  Eyes: Right eye exhibits no discharge  Left eye exhibits no discharge  Neck: No JVD present  Cardiovascular: Normal rate, regular rhythm, normal heart sounds and intact distal pulses  Exam reveals no gallop and no friction rub  No murmur heard  Pulmonary/Chest: Effort normal and breath sounds normal  No respiratory distress  She has no wheezes  She has no rales  She exhibits no tenderness  Abdominal: Soft  Bowel sounds are normal  She exhibits no distension  There is no tenderness  There is no rebound and no guarding  Musculoskeletal: Normal range of motion  She exhibits no edema  Neurological: She is alert and oriented to person, place, and time  Skin: Skin is warm and dry  She is not diaphoretic  There is erythema (erythemia to toes)  Psychiatric:   Attention seeking, inappropriate behavior  Additional Data:     Labs:      Results from last 7 days  Lab Units 07/11/18  0554   WBC Thousand/uL 8 40   HEMOGLOBIN g/dL 12 2   HEMATOCRIT % 36 0   PLATELETS Thousands/uL 207   NEUTROS PCT % 53   LYMPHS PCT % 33   MONOS PCT % 12*   EOS PCT % 2       Results from last 7 days  Lab Units 07/13/18  0951   SODIUM mmol/L 131*   POTASSIUM mmol/L 4 7   CHLORIDE mmol/L 91*   CO2 mmol/L 33*   BUN mg/dL 25   CREATININE mg/dL 0 65   CALCIUM mg/dL 9 3   GLUCOSE RANDOM mg/dL 92                     * I Have Reviewed All Lab Data Listed Above    * Additional Pertinent Lab Tests Reviewed: most recent labs reviewed    Imaging:    Imaging Reports Reviewed Today Include: none  Imaging Personally Reviewed by Myself Includes:  none    Recent Cultures (last 7 days):           Last 24 Hours Medication List:     Current Facility-Administered Medications:  acetaminophen 650 mg Oral Q4H PRN Sarah Urbina PA-C   amLODIPine 5 mg Oral Daily Juan Antonio Rios DO   benztropine 1 mg Oral Q8H PRN Sarah Urbina PA-C   calcium carbonate-vitamin D 1 tablet Oral BID With Meals Elizabeth Lau MD   chlorproMAZINE 25 mg Oral BID Coit , KYE   cholecalciferol 1,000 Units Oral Daily Juan Antonio Rios, DO   divalproex sodium 250 mg Oral Daily Eduar Steward PA-C   divalproex sodium 500 mg Oral HS Juan Antonio Rios, DO   fexofenadine 180 mg Oral Daily Vin Palumbo MD   folic acid 1 mg Oral Daily Gia Stands, DO   levothyroxine 25 mcg Oral Every Other Day Juan Antonio Rios, DO   levothyroxine 50 mcg Oral Every Other Day Juan Antonio Rios, DO   LORazepam 1 mg Intramuscular Q4H PRN Eduar Steward PA-C   LORazepam 1 mg Oral Q6H PRN Eduar Steward PA-C   magnesium hydroxide 30 mL Oral Daily PRN Eduar Steward PA-C   melatonin 3 mg Oral HS Juan Antonio Rios, DO   metoprolol tartrate 25 mg Oral Q12H [address] Juan Antonio Rios, DO   multivitamin stress formula 1 tablet Oral Daily Gia Stands, DO   nicotine polacrilex 2 mg Oral Q2H PRN Eduar Steward PA-C   polyvinyl alcohol 1 drop Both Eyes Q3H PRN KESHAV Jeter   senna-docusate sodium 1 tablet Oral BID KESHAV Jeter   temazepam 15 mg Oral HS Juan Antonio Rios, DO   traZODone 50 mg Oral HS PRN Eduar Steward PA-C   triamcinolone  Topical BID Vin Palumbo MD        Today, Patient Was Seen By: KESHAV Portillo

## 2018-07-16 NOTE — PROGRESS NOTES
Slept in intervals  Wakes to use BR and has set routine she follows  Med compliant this AM  Pleasant and cooperative

## 2018-07-16 NOTE — ASSESSMENT & PLAN NOTE
Takes fosamax every Saturday, however, unable to administer per hospital policy    She is on calcium and vitamin D
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
## 2018-07-16 NOTE — PROGRESS NOTES
Sleeping in intervals  When awake follows set routine   Can be intrusive, laughs inappropriately  Denies SI's  Voiding QS  Ambulates with rolling walker  Returns to sleep readily

## 2018-07-16 NOTE — OCCUPATIONAL THERAPY NOTE
Occupational Therapy  Treatment Note      [facility]    7/16/2018    Patient Active Problem List   Diagnosis    [facility] ([facility] )    Essential hypertension    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HCC)    Hypothyroidism    [facility] depression ([facility] )    Hyponatremia    Loose stools    Chronic leg pain       Past Medical History:   Diagnosis Date    [facility] depression ([facility] )     Essential hypertension     Hypothyroidism     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[facility] )        Past Surgical History:   Procedure Laterality Date    TONSILLECTOMY      TOTAL HIP ARTHROPLASTY Right         07/16/18 0940   Assessment   Assessment Harsh Carter was engaged in OT activity/ game of Life Stories  She was pleasant, participatory  She did take her turn, but when responding to individual questions she did at times elaborate and digress  She was supportive of activity, and she did appear interested in discussion about past experiences  She did laugh at one point, stating that many of her memories were not very positive  Continue to promote active program investment and positive focus  Plan   Treatment Interventions ADL retraining;Continued evaluation; Activityengagement  (coping skills instruction, life management instruction)   Goal Expiration Date 08/12/18   Treatment Day (day 4)   OT Frequency 5x/wk   Jacqueline Sampson OT

## 2018-07-16 NOTE — PROGRESS NOTES
Patient obsessive , repetitive , obsess about the medication, wants to talked to medical team, already talked to medical  Hyper verbal, needy, demands , rude to staff , will continue to monitor

## 2018-07-16 NOTE — ASSESSMENT & PLAN NOTE
HCTZ d/c'd 7/11/18 secondary to hyponatremia  Will repeat labs in AM and continue to monitor  Urine studies completed  Likely 2/2 HCTZ  Also on depakote

## 2018-07-17 PROCEDURE — 97150 GROUP THERAPEUTIC PROCEDURES: CPT

## 2018-07-17 RX ORDER — CHLORPROMAZINE HYDROCHLORIDE 25 MG/1
50 TABLET, FILM COATED ORAL 2 TIMES DAILY
Status: DISCONTINUED | OUTPATIENT
Start: 2018-07-17 | End: 2018-07-23

## 2018-07-17 RX ADMIN — CHLORPROMAZINE HYDROCHLORIDE 25 MG: 25 TABLET, SUGAR COATED ORAL at 08:36

## 2018-07-17 RX ADMIN — TEMAZEPAM 15 MG: 15 CAPSULE ORAL at 22:12

## 2018-07-17 RX ADMIN — MELATONIN 3 MG: 3 TAB ORAL at 22:12

## 2018-07-17 RX ADMIN — TRIAMCINOLONE ACETONIDE: 0.25 CREAM TOPICAL at 08:37

## 2018-07-17 RX ADMIN — OYSTER SHELL CALCIUM WITH VITAMIN D 1 TABLET: 500; 200 TABLET, FILM COATED ORAL at 08:35

## 2018-07-17 RX ADMIN — FOLIC ACID 1 MG: 1 TABLET ORAL at 08:37

## 2018-07-17 RX ADMIN — DIVALPROEX SODIUM 500 MG: 500 TABLET, DELAYED RELEASE ORAL at 22:12

## 2018-07-17 RX ADMIN — FEXOFENADINE HCL 180 MG: 180 TABLET ORAL at 11:47

## 2018-07-17 RX ADMIN — LEVOTHYROXINE SODIUM 25 MCG: 25 TABLET ORAL at 05:45

## 2018-07-17 RX ADMIN — TRIAMCINOLONE ACETONIDE: 0.25 CREAM TOPICAL at 20:01

## 2018-07-17 RX ADMIN — CHLORPROMAZINE HYDROCHLORIDE 25 MG: 25 TABLET, SUGAR COATED ORAL at 17:07

## 2018-07-17 RX ADMIN — OYSTER SHELL CALCIUM WITH VITAMIN D 1 TABLET: 500; 200 TABLET, FILM COATED ORAL at 17:07

## 2018-07-17 RX ADMIN — B-COMPLEX W/ C & FOLIC ACID TAB 1 TABLET: TAB at 08:36

## 2018-07-17 RX ADMIN — SENNOSIDES AND DOCUSATE SODIUM 1 TABLET: 8.6; 5 TABLET ORAL at 17:07

## 2018-07-17 RX ADMIN — VITAMIN D, TAB 1000IU (100/BT) 1000 UNITS: 25 TAB at 08:37

## 2018-07-17 RX ADMIN — SENNOSIDES AND DOCUSATE SODIUM 1 TABLET: 8.6; 5 TABLET ORAL at 08:36

## 2018-07-17 RX ADMIN — DIVALPROEX SODIUM 250 MG: 250 TABLET, DELAYED RELEASE ORAL at 08:37

## 2018-07-17 NOTE — PROGRESS NOTES
Pt currently resting calmly in bed with eyes closed  Will continue to monitor on Q15 minute checks for safety
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
## 2018-07-17 NOTE — NURSING NOTE
Patient is obsessive about medication , clothing, wants to make phone calls all the time  Yelling at the staff  Cooperative with her pills but it takes a lot of time for her to take them  Will continue to monitor

## 2018-07-17 NOTE — PROGRESS NOTES
Patient is ambulatory on the unit with walker  Patient only took 25mg of thorazine  She stated "that Dr Severo Nguyen can not trick her and will not take two pills " Patient's mood is very liable  She laughs excessively loud and talks very fast  Patient is very demanding  Will continue to offer support and monitor for safety

## 2018-07-17 NOTE — OCCUPATIONAL THERAPY NOTE
Occupational Therapy Group Treatment Note      Nichole Marshjoseph    7/17/2018    Patient Active Problem List   Diagnosis    Bipolar 1 disorder ([facility] )    Essential hypertension    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HCC)    Hypothyroidism    Bipolar depression ([facility] )    Hyponatremia    Loose stools    Chronic leg pain    Seasonal allergies    Bilateral dry eyes    Age-related osteoporosis without current pathological fracture       Past Medical History:   Diagnosis Date    Bipolar depression ([facility] )     Essential hypertension     Hypothyroidism     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[facility] )        Past Surgical History:   Procedure Laterality Date    TONSILLECTOMY      TOTAL HIP ARTHROPLASTY Right         07/17/18 0955   Assessment   Assessment Saintclair Seller was present for the full Life Management session  She was pleasant, alert as she sat at the group table  She was attentive to filling out her menu for the next day, but she did take her turn when this came up  She was generally pleasant and friendly  At one point a nursing staff did come into the room and then leave  Fauzia Captain did express some suspiciousness regarding that this staff person was angry with her  Otherwise, she did have a good sense of humor throughout the session  Progress has been slow but steady towards her goal  Her contributions to this program were commended  Continue to promote active investment in OT Life Management program    Plan   Treatment Interventions ADL retraining;Continued evaluation; Activityengagement  (coping skills training, life management training)   Goal Expiration Date 08/12/18   Treatment Day (day 5)   OT Frequency 5x/wk   Dave Frausto OT
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
## 2018-07-17 NOTE — PROGRESS NOTES
EMR reviewed, pt seen  Receives [employer], no weight loss during hospitalization  Pt assessed with low evidence of nutrition risk  Provide basic care services, review q 8 days

## 2018-07-17 NOTE — PROGRESS NOTES
Awake, alert, oriented, poor insight  Anxious, labile, irritable, somatic, paranoid at times  Medication compliant  Demanding, verbally abusive, hyperverbal, tangential  Currently resting in dining room eating breakfast  Will continue to monitor

## 2018-07-18 LAB
ANION GAP SERPL CALCULATED.3IONS-SCNC: 4 MMOL/L (ref 5–14)
BUN SERPL-MCNC: 29 MG/DL (ref 5–25)
CALCIUM SERPL-MCNC: 9.1 MG/DL (ref 8.4–10.2)
CHLORIDE SERPL-SCNC: 93 MMOL/L (ref 97–108)
CO2 SERPL-SCNC: 36 MMOL/L (ref 22–30)
CREAT SERPL-MCNC: 0.59 MG/DL (ref 0.6–1.2)
EOSINOPHIL # BLD AUTO: 0.07 THOUSAND/UL (ref 0–0.4)
EOSINOPHIL NFR BLD MANUAL: 1 % (ref 0–6)
ERYTHROCYTE [DISTWIDTH] IN BLOOD BY AUTOMATED COUNT: 13.6 %
GFR SERPL CREATININE-BSD FRML MDRD: 88 ML/MIN/1.73SQ M
GLUCOSE P FAST SERPL-MCNC: 92 MG/DL (ref 70–99)
GLUCOSE SERPL-MCNC: 92 MG/DL (ref 70–99)
HCT VFR BLD AUTO: 33.8 % (ref 36–46)
HGB BLD-MCNC: 11.6 G/DL (ref 12–16)
LYMPHOCYTES # BLD AUTO: 1.53 THOUSAND/UL (ref 0.5–4)
LYMPHOCYTES # BLD AUTO: 21 % (ref 20–50)
MCH RBC QN AUTO: 34.4 PG (ref 26–34)
MCHC RBC AUTO-ENTMCNC: 34.4 G/DL (ref 31–36)
MCV RBC AUTO: 100 FL (ref 80–100)
MONOCYTES # BLD AUTO: 0.66 THOUSAND/UL (ref 0.2–0.9)
MONOCYTES NFR BLD AUTO: 9 % (ref 1–10)
NEUTS BAND NFR BLD MANUAL: 1 % (ref 0–8)
NEUTS SEG # BLD: 4.89 THOUSAND/UL (ref 1.8–7.8)
NEUTS SEG NFR BLD AUTO: 66 %
PLATELET # BLD AUTO: 294 THOUSANDS/UL (ref 150–450)
PLATELET BLD QL SMEAR: ADEQUATE
PMV BLD AUTO: 8.2 FL (ref 8.9–12.7)
POTASSIUM SERPL-SCNC: 4.7 MMOL/L (ref 3.6–5)
RBC # BLD AUTO: 3.38 MILLION/UL (ref 4–5.2)
RBC MORPH BLD: NORMAL
SODIUM SERPL-SCNC: 133 MMOL/L (ref 137–147)
TOTAL CELLS COUNTED SPEC: 100
VALPROATE SERPL-MCNC: 69.3 UG/ML (ref 50–120)
VARIANT LYMPHS # BLD AUTO: 2 % (ref 0–0)
WBC # BLD AUTO: 7.3 THOUSAND/UL (ref 4.5–11)

## 2018-07-18 PROCEDURE — 85007 BL SMEAR W/DIFF WBC COUNT: CPT | Performed by: PHYSICIAN ASSISTANT

## 2018-07-18 PROCEDURE — 85027 COMPLETE CBC AUTOMATED: CPT | Performed by: PHYSICIAN ASSISTANT

## 2018-07-18 PROCEDURE — 80164 ASSAY DIPROPYLACETIC ACD TOT: CPT | Performed by: PHYSICIAN ASSISTANT

## 2018-07-18 PROCEDURE — 80048 BASIC METABOLIC PNL TOTAL CA: CPT | Performed by: PHYSICIAN ASSISTANT

## 2018-07-18 RX ORDER — LORAZEPAM 0.5 MG/1
0.5 TABLET ORAL EVERY 6 HOURS PRN
Status: DISCONTINUED | OUTPATIENT
Start: 2018-07-18 | End: 2018-07-26 | Stop reason: HOSPADM

## 2018-07-18 RX ADMIN — TRIAMCINOLONE ACETONIDE: 0.25 CREAM TOPICAL at 21:36

## 2018-07-18 RX ADMIN — OYSTER SHELL CALCIUM WITH VITAMIN D 1 TABLET: 500; 200 TABLET, FILM COATED ORAL at 19:06

## 2018-07-18 RX ADMIN — SENNOSIDES AND DOCUSATE SODIUM 1 TABLET: 8.6; 5 TABLET ORAL at 08:20

## 2018-07-18 RX ADMIN — FEXOFENADINE HCL 180 MG: 180 TABLET ORAL at 12:30

## 2018-07-18 RX ADMIN — CHLORPROMAZINE HYDROCHLORIDE 25 MG: 25 TABLET, SUGAR COATED ORAL at 08:19

## 2018-07-18 RX ADMIN — CHLORPROMAZINE HYDROCHLORIDE 50 MG: 25 TABLET, SUGAR COATED ORAL at 19:06

## 2018-07-18 RX ADMIN — LEVOTHYROXINE SODIUM 50 MCG: 25 TABLET ORAL at 06:18

## 2018-07-18 RX ADMIN — B-COMPLEX W/ C & FOLIC ACID TAB 1 TABLET: TAB at 08:20

## 2018-07-18 RX ADMIN — MELATONIN 3 MG: 3 TAB ORAL at 22:08

## 2018-07-18 RX ADMIN — POLYVINYL ALCOHOL 1 DROP: 14 SOLUTION/ DROPS OPHTHALMIC at 17:06

## 2018-07-18 RX ADMIN — OYSTER SHELL CALCIUM WITH VITAMIN D 1 TABLET: 500; 200 TABLET, FILM COATED ORAL at 08:20

## 2018-07-18 RX ADMIN — AMLODIPINE BESYLATE 5 MG: 5 TABLET ORAL at 08:20

## 2018-07-18 RX ADMIN — METOPROLOL TARTRATE 25 MG: 25 TABLET, FILM COATED ORAL at 08:20

## 2018-07-18 RX ADMIN — DIVALPROEX SODIUM 500 MG: 500 TABLET, DELAYED RELEASE ORAL at 22:08

## 2018-07-18 RX ADMIN — DIVALPROEX SODIUM 250 MG: 250 TABLET, DELAYED RELEASE ORAL at 08:19

## 2018-07-18 RX ADMIN — FOLIC ACID 1 MG: 1 TABLET ORAL at 08:19

## 2018-07-18 RX ADMIN — VITAMIN D, TAB 1000IU (100/BT) 1000 UNITS: 25 TAB at 08:20

## 2018-07-18 RX ADMIN — METOPROLOL TARTRATE 25 MG: 25 TABLET, FILM COATED ORAL at 22:07

## 2018-07-18 RX ADMIN — TEMAZEPAM 15 MG: 15 CAPSULE ORAL at 22:08

## 2018-07-18 RX ADMIN — SENNOSIDES AND DOCUSATE SODIUM 1 TABLET: 8.6; 5 TABLET ORAL at 19:06

## 2018-07-18 RX ADMIN — TRIAMCINOLONE ACETONIDE: 0.25 CREAM TOPICAL at 08:21

## 2018-07-18 NOTE — PROGRESS NOTES
Pt with good appetite and steady gait with rw  Resistive but compliant with increased thorazine after speaking with Attending  Pt refused senna-s r/t freq bm's on 7/17  VSS  Pt interactive with peers  Loud, pressured and hyperverbal   Pt with abn chemistry, D  [facility] notified

## 2018-07-18 NOTE — SOCIAL WORK
Pt continues to be manic, intrusive , and demanding  Pt wants to return home with her sister Jaleesa Beasley  Pt grandson arabella plans to visit tomorrow or Fri to discuss DC plan  SW will continue to follow up

## 2018-07-18 NOTE — PROGRESS NOTES
Sleeping in intervals  Wakes to use BR , remains set in her routines and rituals, folds toilet paper special way, washes hands at sink and then needs to wipe with sani wipes  Ambulates with walker  Not voicing any SI's, denies hallucinations

## 2018-07-18 NOTE — PROGRESS NOTES
Psychiatry Progress Note    Subjective: Interval History     The patient continues to have a labile mood  She is irritable at times  She has poor concentration  She is focused on receiving Ativan  Patient had long discussion with Dr Ritesh Grant and RACHEL this morning  It was discussed with patient the reason for her thorazine and the increased dose  Patient was encouraged to take it  The patient is meal compliant and sleeping well  Patient denies SI         Current medications:    Current Facility-Administered Medications:     acetaminophen (TYLENOL) tablet 650 mg, 650 mg, Oral, Q4H PRN, Ashley Porter PA-C    amLODIPine (NORVASC) tablet 5 mg, 5 mg, Oral, Daily, Juan Antonio Rios DO, 5 mg at 07/18/18 0820    benztropine (COGENTIN) tablet 1 mg, 1 mg, Oral, Q8H PRN, Ashley Porter PA-C    calcium carbonate-vitamin D (OSCAL-D) 500 mg-200 units per tablet 1 tablet, 1 tablet, Oral, BID With Meals, Meenakshi Fan MD, 1 tablet at 07/18/18 0820    chlorproMAZINE (THORAZINE) tablet 50 mg, 50 mg, Oral, BID, Misty Buck PA-C, 25 mg at 07/18/18 5822    cholecalciferol (VITAMIN D3) tablet 1,000 Units, 1,000 Units, Oral, Daily, Juan Antonio Rios DO, 1,000 Units at 07/18/18 0820    divalproex sodium (DEPAKOTE) EC tablet 250 mg, 250 mg, Oral, Daily, Ashley Porter PA-C, 250 mg at 07/18/18 5621    divalproex sodium (DEPAKOTE) EC tablet 500 mg, 500 mg, Oral, HS, Juan Antonio Rios DO, 500 mg at 07/17/18 2212    fexofenadine (ALLEGRA) tablet 180 mg, 180 mg, Oral, Daily, Meenakshi Fan MD, 180 mg at 55/42/96 2772    folic acid (FOLVITE) tablet 1 mg, 1 mg, Oral, Daily, Juan Antonio Rios DO, 1 mg at 07/18/18 4611    levothyroxine tablet 25 mcg, 25 mcg, Oral, Every Other Day, Juan Antonio Rios DO, 25 mcg at 07/17/18 0545    levothyroxine tablet 50 mcg, 50 mcg, Oral, Every Other Day, Juan Antonio Rios DO, 50 mcg at 07/18/18 0618    LORazepam (ATIVAN) tablet 0 5 mg, 0 5 mg, Oral, Q6H PRN, Disha Jones PA-C    magnesium hydroxide (MILK OF MAGNESIA) 400 mg/5 mL oral suspension 30 mL, 30 mL, Oral, Daily PRN, Eduar Steward PA-C    melatonin tablet 3 mg, 3 mg, Oral, HS, Juan Antonio Burnsng, DO, 3 mg at 07/17/18 2212    metoprolol tartrate (LOPRESSOR) tablet 25 mg, 25 mg, Oral, Q12H [facility], Juan Antonio Burnsng, DO, 25 mg at 07/18/18 0820    multivitamin stress formula tablet 1 tablet, 1 tablet, Oral, Daily, Gia Stands, DO, 1 tablet at 07/18/18 0820    nicotine polacrilex (NICORETTE) gum 2 mg, 2 mg, Oral, Q2H PRN, Eduar Steward PA-C    polyvinyl alcohol (LIQUIFILM TEARS) 1 4 % ophthalmic solution 1 drop, 1 drop, Both Eyes, Q3H PRN, Rubi Ciminidorothea CRNP    senna-docusate sodium (SENOKOT S) 8 6-50 mg per tablet 1 tablet, 1 tablet, Oral, BID, Rubi Ciminieri, CRNP, 1 tablet at 07/18/18 0820    temazepam (RESTORIL) capsule 15 mg, 15 mg, Oral, HS, Juan Antonio Rios, DO, 15 mg at 07/17/18 2212    traZODone (DESYREL) tablet 50 mg, 50 mg, Oral, HS PRN, Eduar Steward PA-C    triamcinolone (KENALOG) 0 025 % cream, , Topical, BID, Vin Palumbo MD      Objective:     Vital Signs:  Vitals:    07/17/18 1901 07/17/18 2210 07/18/18 0145 07/18/18 0735   BP: 111/56 108/54  139/61   BP Location: Left arm   Left arm   Pulse: 104 100  96   Resp:    18   Temp:    98 °F (36 7 °C)   TempSrc:    Temporal   SpO2:    98%   Weight:   56 3 kg (124 lb 3 2 oz)    Height:             Appearance:  age appropriate and casually dressed   Behavior:  normal   Speech:  normal volume   Mood:  labile   Affect:  labile   Thought Process:  flight of ideas, loose associations and perserverative   Thought Content:  normal   Perceptual Disturbances: None   Risk Potential: none   Sensorium:  person, place, situation and time   Cognition:  intact   Consciousness:  alert and awake    Attention: attention span appeared shorter than expected for age   Intellect: average   Insight:  limited   Judgment: limited      Motor Activity: no abnormal movements           Recent Labs:  Results Reviewed     None          I/O Past 24 hours:  I/O last 3 completed shifts: In: 1620 [P O :1620]  Out: -   I/O this shift:  In: 240 [P O :240]  Out: -         Assessment / Plan:     Bipolar 1 disorder ([facility] )    Recommended Treatment:      Medication changes:  1) Encourage patient to take higher dosage of thorazine  Non-pharmacological treatments  1) Continue with group therapy, milieu therapy and occupational therapy  Safety  1) Safety/communication plan established targeting dynamic risk factors above  2) Risks, benefits, and possible side effects of medications explained to patient and patient verbalizes understanding  Counseling / Coordination of Care    Total floor / unit time spent today 20 minutes  Greater than 50% of total time was spent with the patient and / or family counseling and / or coordination of care  A description of the counseling / coordination of care  Patient's Rights, confidentiality and exceptions to confidentiality, use of automated medical record, [facility] staff access to medical record, and consent to treatment reviewed      Dariel Steen PA-C

## 2018-07-19 RX ORDER — ALENDRONATE SODIUM 35 MG/1
35 TABLET ORAL WEEKLY
Status: DISCONTINUED | OUTPATIENT
Start: 2018-07-21 | End: 2018-07-26 | Stop reason: HOSPADM

## 2018-07-19 RX ORDER — DIPHENOXYLATE HYDROCHLORIDE AND ATROPINE SULFATE 2.5; .025 MG/1; MG/1
1 TABLET ORAL 4 TIMES DAILY PRN
Status: DISCONTINUED | OUTPATIENT
Start: 2018-07-19 | End: 2018-07-26 | Stop reason: HOSPADM

## 2018-07-19 RX ADMIN — VITAMIN D, TAB 1000IU (100/BT) 1000 UNITS: 25 TAB at 09:26

## 2018-07-19 RX ADMIN — MELATONIN 3 MG: 3 TAB ORAL at 21:38

## 2018-07-19 RX ADMIN — METOPROLOL TARTRATE 25 MG: 25 TABLET, FILM COATED ORAL at 09:27

## 2018-07-19 RX ADMIN — OYSTER SHELL CALCIUM WITH VITAMIN D 1 TABLET: 500; 200 TABLET, FILM COATED ORAL at 09:26

## 2018-07-19 RX ADMIN — TRIAMCINOLONE ACETONIDE: 0.25 CREAM TOPICAL at 09:27

## 2018-07-19 RX ADMIN — TRIAMCINOLONE ACETONIDE: 0.25 CREAM TOPICAL at 17:48

## 2018-07-19 RX ADMIN — TEMAZEPAM 15 MG: 15 CAPSULE ORAL at 21:38

## 2018-07-19 RX ADMIN — B-COMPLEX W/ C & FOLIC ACID TAB 1 TABLET: TAB at 09:26

## 2018-07-19 RX ADMIN — METOPROLOL TARTRATE 25 MG: 25 TABLET, FILM COATED ORAL at 21:38

## 2018-07-19 RX ADMIN — FOLIC ACID 1 MG: 1 TABLET ORAL at 09:26

## 2018-07-19 RX ADMIN — AMLODIPINE BESYLATE 5 MG: 5 TABLET ORAL at 09:26

## 2018-07-19 RX ADMIN — DIVALPROEX SODIUM 500 MG: 500 TABLET, DELAYED RELEASE ORAL at 21:38

## 2018-07-19 RX ADMIN — OYSTER SHELL CALCIUM WITH VITAMIN D 1 TABLET: 500; 200 TABLET, FILM COATED ORAL at 17:47

## 2018-07-19 RX ADMIN — LEVOTHYROXINE SODIUM 25 MCG: 25 TABLET ORAL at 06:33

## 2018-07-19 RX ADMIN — CHLORPROMAZINE HYDROCHLORIDE 50 MG: 25 TABLET, SUGAR COATED ORAL at 09:26

## 2018-07-19 RX ADMIN — DIVALPROEX SODIUM 250 MG: 250 TABLET, DELAYED RELEASE ORAL at 09:26

## 2018-07-19 RX ADMIN — FEXOFENADINE HCL 180 MG: 180 TABLET ORAL at 12:38

## 2018-07-19 RX ADMIN — CHLORPROMAZINE HYDROCHLORIDE 50 MG: 25 TABLET, SUGAR COATED ORAL at 17:46

## 2018-07-19 NOTE — PROGRESS NOTES
Pt had soft BM , at first c/o diarrhea , when she showed me her BM it was soft but not diarrhea  Asking for her home medicine remedy, states she discussed with Dr Damon Bray this AM  I reviewed her med orders and told her there was nothing ordered  She then asked me to have Dr herrera address whether he thinks the depakote is causing her bowel problems or whether she should change her diet   I informed her I would pass on in report to 7-3 nurse  Very fixated on her bowels

## 2018-07-19 NOTE — PROGRESS NOTES
Psychiatry Progress Note    Subjective: Interval History     The patient continues to have a labile mood  She is very focused on her soft bowel movement  Staff states she does not have diarrhea  She is focused now on her diet and believes her diet is causing self bowel movements  She is hyperverbal and inappropriate at times  She did take her 50 mg b i d  of Thorazine yesterday after long discussion with Dr Pilar Torres  The patient is pleasant during conversation however is easily distracted  Will continue to monitor patient's compliance with medication  Patient denies suicidal ideation  She denies auditory or visual hallucinations  She is sleeping well  Her Depakote level 69 3        Current medications:    Current Facility-Administered Medications:     acetaminophen (TYLENOL) tablet 650 mg, 650 mg, Oral, Q4H PRN, Davey Landon PA-C    amLODIPine (NORVASC) tablet 5 mg, 5 mg, Oral, Daily, Juan Antonio Rios DO, 5 mg at 07/19/18 5342    benztropine (COGENTIN) tablet 1 mg, 1 mg, Oral, Q8H PRN, Davey Landon PA-C    calcium carbonate-vitamin D (OSCAL-D) 500 mg-200 units per tablet 1 tablet, 1 tablet, Oral, BID With Meals, Marianela Beverly MD, 1 tablet at 07/19/18 0926    chlorproMAZINE (THORAZINE) tablet 50 mg, 50 mg, Oral, BID, Misty Buck PA-C, 50 mg at 07/19/18 4346    cholecalciferol (VITAMIN D3) tablet 1,000 Units, 1,000 Units, Oral, Daily, Kathy Schmidt DO, 1,000 Units at 07/19/18 0926    diphenoxylate-atropine (LOMOTIL) 2 5-0 025 mg per tablet 1 tablet, 1 tablet, Oral, 4x Daily PRN, Mansi Parker PA-C    divalproex sodium (DEPAKOTE) EC tablet 250 mg, 250 mg, Oral, Daily, Davey Landon PA-C, 250 mg at 07/19/18 0926    divalproex sodium (DEPAKOTE) EC tablet 500 mg, 500 mg, Oral, HS, Juan Antonio Rios DO, 500 mg at 07/18/18 2208    fexofenadine (ALLEGRA) tablet 180 mg, 180 mg, Oral, Daily, Marianela Beverly MD, 180 mg at 33/99/20 0012    folic acid (FOLVITE) tablet 1 mg, 1 mg, Oral, Daily, Lacretia Rocker, DO, 1 mg at 07/19/18 0450    levothyroxine tablet 25 mcg, 25 mcg, Oral, Every Other Day, Lacretia Rocker, DO, 25 mcg at 07/19/18 2983    levothyroxine tablet 50 mcg, 50 mcg, Oral, Every Other Day, Juan Antonio Gabriel, DO, 50 mcg at 07/18/18 0618    LORazepam (ATIVAN) tablet 0 5 mg, 0 5 mg, Oral, Q6H PRN, Nathan Keating PA-C    magnesium hydroxide (MILK OF MAGNESIA) 400 mg/5 mL oral suspension 30 mL, 30 mL, Oral, Daily PRN, Diogenes Valencia PA-C    melatonin tablet 3 mg, 3 mg, Oral, HS, Juan Antonio Rios, DO, 3 mg at 07/18/18 2208    metoprolol tartrate (LOPRESSOR) tablet 25 mg, 25 mg, Oral, Q12H [address], Juan Antonio Rios, DO, 25 mg at 07/19/18 8350    multivitamin stress formula tablet 1 tablet, 1 tablet, Oral, Daily, Lacrrupindera er, DO, 1 tablet at 07/19/18 1566    nicotine polacrilex (NICORETTE) gum 2 mg, 2 mg, Oral, Q2H PRN, Diogenes Valencia PA-C    polyvinyl alcohol (LIQUIFILM TEARS) 1 4 % ophthalmic solution 1 drop, 1 drop, Both Eyes, Q3H PRN, Rubi Ciminieri, CRNP, 1 drop at 07/18/18 1706    senna-docusate sodium (SENOKOT S) 8 6-50 mg per tablet 1 tablet, 1 tablet, Oral, BID, Rubi Ciminieri, CRNP, 1 tablet at 07/18/18 1906    temazepam (RESTORIL) capsule 15 mg, 15 mg, Oral, HS, Juan Antonio Rios, DO, 15 mg at 07/18/18 2208    traZODone (DESYREL) tablet 50 mg, 50 mg, Oral, HS PRN, Diogenes Valencia PA-C    triamcinolone (KENALOG) 0 025 % cream, , Topical, BID, Jocelyn Leal MD      Objective:     Vital Signs:  Vitals:    07/18/18 2100 07/18/18 2207 07/19/18 0500 07/19/18 0728   BP:  131/54  145/65   BP Location:    Left arm   Pulse:  (!) 54  67   Resp:    18   Temp:    97 6 °F (36 4 °C)   TempSrc:    Temporal   SpO2:    99%   Weight: 56 3 kg (124 lb 3 2 oz)  55 7 kg (122 lb 14 4 oz)    Height:             Appearance:  age appropriate and casually dressed   Behavior:  normal   Speech:  normal volume   Mood:  labile   Affect:  labile   Thought Process:  flight of ideas, loose associations and perserverative   Thought Content:  normal   Perceptual Disturbances: None   Risk Potential: none   Sensorium:  person, place, situation and time   Cognition:  intact   Consciousness:  alert and awake    Attention: attention span appeared shorter than expected for age   Intellect: average   Insight:  limited   Judgment: limited      Motor Activity: no abnormal movements           Recent Labs:  Results Reviewed     None          I/O Past 24 hours:  I/O last 3 completed shifts: In: 1080 [P O :1080]  Out: -   I/O this shift:  In: 65 [P O :660]  Out: -         Assessment / Plan:     Bipolar 1 disorder ([facility] )    Recommended Treatment:      Medication changes:  1) Continue current medication regimen  Non-pharmacological treatments  1) Continue with group therapy, milieu therapy and occupational therapy  Safety  1) Safety/communication plan established targeting dynamic risk factors above  2) Risks, benefits, and possible side effects of medications explained to patient and patient verbalizes understanding  Counseling / Coordination of Care    Total floor / unit time spent today 20 minutes  Greater than 50% of total time was spent with the patient and / or family counseling and / or coordination of care  A description of the counseling / coordination of care  Patient's Rights, confidentiality and exceptions to confidentiality, use of automated medical record, [address] [place] [address] staff access to medical record, and consent to treatment reviewed      Aquilino Mayes PA-C

## 2018-07-19 NOTE — SOCIAL WORK
SW met with pt and pt marilu Wasserman  DC plan is for pt to go home  Theresa Sueropatel stated that pt  agreed to move out  Pt will live at home with grandson regularly checking in on  pt  Pt has PT through [address] [facility] which will continue  SW will continue to follow up

## 2018-07-19 NOTE — PROGRESS NOTES
Spoke with pharmacy  Patients own med orders entered as requested by pt  Obtained authorization from pharmacy to have pt take Fosamax if she has someone bring in her own med  Spoke with Hector Clarke from pharmacy

## 2018-07-19 NOTE — PROGRESS NOTES
Pt with good appetite and steady gait using rw  Med-compliant  VSS  Pt anxious but more redirectable  No agitation noted  Monitored for safety and support

## 2018-07-19 NOTE — PROGRESS NOTES
07/19/18 1000   Activity/Group Checklist   Group Other (Comment)  (Art Therapy Process Group / Open Choice)   Attendance Attended   Attendance Duration (min) 46-60   Interactions Interacted appropriately   Affect/Mood Appropriate   Goals Achieved Identified feelings; Able to self-disclose; Able to recieve feedback; Able to give feedback to another;Able to listen to others; Able to engage in interactions     Patient verbally engaged in the session, disclosing concerns regarding living situation, family issues, and her appearance  Became emotional when talking about her  and sister who are not speaking to her     SHANNAN Woodall AT Intern  Shahid Lucila MPS, ATR-BC

## 2018-07-19 NOTE — PROGRESS NOTES
Monitored on Q 15 minute safety checks, currently observed with eyes closed, appears to be sleeping   No behavior issues noted

## 2018-07-19 NOTE — PROGRESS NOTES
Patient is alert, awake, oriented  Patient is compliant with meal and medications  Patient is loud and hyper-verbal  Patient socializes with peers  Patient continues to be very demanding, redirection effective at times  Patient denies any pain or discomfort  Q15 min checks continue for safety and support

## 2018-07-20 PROCEDURE — 97530 THERAPEUTIC ACTIVITIES: CPT

## 2018-07-20 RX ADMIN — MELATONIN 3 MG: 3 TAB ORAL at 21:56

## 2018-07-20 RX ADMIN — DIVALPROEX SODIUM 500 MG: 500 TABLET, DELAYED RELEASE ORAL at 21:56

## 2018-07-20 RX ADMIN — DIPHENOXYLATE HYDROCHLORIDE AND ATROPINE SULFATE 1 TABLET: 2.5; .025 TABLET ORAL at 01:14

## 2018-07-20 RX ADMIN — OYSTER SHELL CALCIUM WITH VITAMIN D 1 TABLET: 500; 200 TABLET, FILM COATED ORAL at 09:00

## 2018-07-20 RX ADMIN — AMLODIPINE BESYLATE 5 MG: 5 TABLET ORAL at 09:00

## 2018-07-20 RX ADMIN — VITAMIN D, TAB 1000IU (100/BT) 1000 UNITS: 25 TAB at 09:00

## 2018-07-20 RX ADMIN — TEMAZEPAM 15 MG: 15 CAPSULE ORAL at 21:57

## 2018-07-20 RX ADMIN — LEVOTHYROXINE SODIUM 50 MCG: 25 TABLET ORAL at 05:44

## 2018-07-20 RX ADMIN — FEXOFENADINE HCL 180 MG: 180 TABLET ORAL at 13:33

## 2018-07-20 RX ADMIN — FOLIC ACID 1 MG: 1 TABLET ORAL at 09:00

## 2018-07-20 RX ADMIN — METOPROLOL TARTRATE 25 MG: 25 TABLET, FILM COATED ORAL at 09:00

## 2018-07-20 RX ADMIN — CHLORPROMAZINE HYDROCHLORIDE 50 MG: 25 TABLET, SUGAR COATED ORAL at 09:00

## 2018-07-20 RX ADMIN — CHLORPROMAZINE HYDROCHLORIDE 50 MG: 25 TABLET, SUGAR COATED ORAL at 17:57

## 2018-07-20 RX ADMIN — DIVALPROEX SODIUM 250 MG: 250 TABLET, DELAYED RELEASE ORAL at 09:00

## 2018-07-20 RX ADMIN — OYSTER SHELL CALCIUM WITH VITAMIN D 1 TABLET: 500; 200 TABLET, FILM COATED ORAL at 17:57

## 2018-07-20 NOTE — PROGRESS NOTES
07/20/18 1000   Activity/Group Checklist   Group Other (Comment)  (Art Therapy Process Group / Open Choice)   Attendance Attended   Attendance Duration (min) 46-60   Interactions Interacted appropriately   Affect/Mood Angry; Appropriate   Goals Achieved Identified feelings; Able to listen to others; Able to engage in interactions; Able to self-disclose; Able to recieve feedback; Able to give feedback to another  (Able to engage in materials)     Patient engaged in art-making and discussion  Disclose her aggravation about not being able to see the doctor  Left ten minutes early to go find the doctor     N Jose C AT Intern  Manan Bob MPS, ATR-BC

## 2018-07-20 NOTE — PLAN OF CARE
Problem: PAIN - ADULT  Goal: Verbalizes/displays adequate comfort level or baseline comfort level  Interventions:  - Encourage patient to monitor pain and request assistance  - Assess pain using appropriate pain scale  - Administer analgesics based on type and severity of pain and evaluate response  - Implement non-pharmacological measures as appropriate and evaluate response  - Consider cultural and social influences on pain and pain management  - Notify physician/advanced practitioner if interventions unsuccessful or patient reports new pain   Outcome: Progressing      Problem: SAFETY ADULT  Goal: Patient will remain free of falls  INTERVENTIONS:  - Assess patient frequently for physical needs  -  Identify cognitive and physical deficits and behaviors that affect risk of falls    -  [place] fall precautions as indicated by assessment   - Educate patient/family on patient safety including physical limitations  - Instruct patient to call for assistance with activity based on assessment  - Modify environment to reduce risk of injury  - Consider OT/PT consult to assist with strengthening/mobility   Outcome: Progressing      Problem: DISCHARGE PLANNING  Goal: Discharge to home or other facility with appropriate resources  INTERVENTIONS:  - Identify barriers to discharge w/patient and caregiver  - Arrange for needed discharge resources and transportation as appropriate  - Identify discharge learning needs (meds, wound care, etc )  - Arrange for interpretive services to assist at discharge as needed  - Refer to Case Management Department for coordinating discharge planning if the patient needs post-hospital services based on physician/advanced practitioner order or complex needs related to functional status, cognitive ability, or social support system   Outcome: Progressing      Problem: BEHAVIOR  Goal: Pt/Family maintain appropriate behavior and adhere to behavioral management agreement, if implemented  INTERVENTIONS:  - Assess the family dynamic   - Encourage verbalization of thoughts and concerns in a socially appropriate manner  - Assess patient/family's coping skills and non-compliant behavior (including use of illegal substances)  - Utilize positive, consistent limit setting strategies supporting safety of patient, staff and others  - Initiate consult with Case Management, Spiritual Care or other ancillary services as appropriate  - If a patient's/visitor's behavior jeopardizes the safety of the patient, staff, or others, refer to organization procedure     - Notify Security of behavior or suspected illegal substances which indicate the need for search of the patient and/or belongings  - Encourage participation in the decision making process about a behavioral management agreement; implement if patient meets criteria   Outcome: Progressing      Problem: ANXIETY  Goal: Will report anxiety at manageable levels  INTERVENTIONS:  - Administer medication as ordered  - Teach and encourage coping skills  - Provide emotional support  - Assess patient/family for anxiety and ability to cope   Outcome: Progressing    Goal: By discharge: Patient will verbalize 2 strategies to deal with anxiety  Interventions:  - Identify any obvious source/trigger to anxiety  - Staff will assist patient in applying identified coping technique/skills  - Encourage attendance of scheduled groups and activities   Outcome: Progressing      Problem: Prexisting or High Potential for Compromised Skin Integrity  Goal: Skin integrity is maintained or improved  INTERVENTIONS:  - Identify patients at risk for skin breakdown  - Assess and monitor skin integrity  - Assess and monitor nutrition and hydration status  - Monitor labs (i e  albumin)  - Assess for incontinence   - Turn and reposition patient  - Assist with mobility/ambulation  - Relieve pressure over bony prominences  - Avoid friction and shearing  - Provide appropriate hygiene as needed including keeping skin clean and dry  - Evaluate need for skin moisturizer/barrier cream  - Collaborate with interdisciplinary team (i e  Nutrition, Rehabilitation, etc )   - Patient/family teaching   Outcome: Progressing      Problem: Prexisting or High Potential for Compromised Skin Integrity  Goal: Skin integrity is maintained or improved  INTERVENTIONS:  - Identify patients at risk for skin breakdown  - Assess and monitor skin integrity  - Assess and monitor nutrition and hydration status  - Monitor labs (i e  albumin)  - Assess for incontinence   - Turn and reposition patient  - Assist with mobility/ambulation  - Relieve pressure over bony prominences  - Avoid friction and shearing  - Provide appropriate hygiene as needed including keeping skin clean and dry  - Evaluate need for skin moisturizer/barrier cream  - Collaborate with interdisciplinary team (i e  Nutrition, Rehabilitation, etc )   - Patient/family teaching   Outcome: Progressing      Problem: Nutrition/Hydration-ADULT  Goal: Nutrient/Hydration intake appropriate for improving, restoring or maintaining nutritional needs  Monitor and assess patient's nutrition/hydration status for malnutrition (ex- brittle hair, bruises, dry skin, pale skin and conjunctiva, muscle wasting, smooth red tongue, and disorientation)  Collaborate with interdisciplinary team and initiate plan and interventions as ordered  Monitor patient's weight and dietary intake as ordered or per policy  Utilize nutrition screening tool and intervene per policy  Determine patient's food preferences and provide high-protein, high-caloric foods as appropriate       INTERVENTIONS:  - Monitor oral intake, urinary output, labs, and treatment plans  - Assess nutrition and hydration status and recommend course of action  - Evaluate amount of meals eaten  - Assist patient with eating if necessary   - Allow adequate time for meals  - Recommend/ encourage appropriate diets, oral nutritional supplements, and vitamin/mineral supplements  - Order, calculate, and assess calorie counts as needed  - Recommend, monitor, and adjust tube feedings and TPN/PPN based on assessed needs  - Assess need for intravenous fluids  - Provide specific nutrition/hydration education as appropriate  - Include patient/family/caregiver in decisions related to nutrition   Outcome: Progressing

## 2018-07-20 NOTE — PROGRESS NOTES
Patient is alert, awake, oriented  Patient is compliant with medications and meals  Patient is loud and hyper-verbal  Patient tearful at times  Patient continues to be demanding at times, redirection effective  Patient denies any pain or discomfort  Q 15 min checks continue for safety and support

## 2018-07-20 NOTE — PROGRESS NOTES
Awake at start of shift , requested and given lomotil for loose BM  Returned to bed  Currently observed with eyes closed and respirations noted  Appears to be sleeping

## 2018-07-20 NOTE — PROGRESS NOTES
Patient is alert, awake, oriented  Patient is compliant with meal and medications  Patient continues to be demanding and hyper-verbal  Patient denies any pain or discomfort at this time  Q 15 min checks continue for safety and support

## 2018-07-20 NOTE — PROGRESS NOTES
Given synthroid 50 mg as ordered, pulled 2 ,25 mg tabs instead of 50 mg from pyxis  Asking for lomotil , has not had any more BM's  States at home she takes it every 3 hours  Attempted to educate on med dosage and reason for med but patient did not want me to explain  Slept in long intervals, remains set in her routines and irritable when questioned

## 2018-07-20 NOTE — PROGRESS NOTES
Psychiatry Progress Note    Subjective: Interval History     Patient continues to be very somatically preoccupied  Her mood continues to be labile and unpredictable  She is very entitled and attention seeking  She is specially focused on her bowel movements  She has been tolerating her medications well without side effect  Her Depakote level is therapeutic yesterday        Current medications:    Current Facility-Administered Medications:     acetaminophen (TYLENOL) tablet 650 mg, 650 mg, Oral, Q4H PRN, Medardo Contreras PA-C    amLODIPine (NORVASC) tablet 5 mg, 5 mg, Oral, Daily, Juan Antonio Rios DO, 5 mg at 07/20/18 0900    benztropine (COGENTIN) tablet 1 mg, 1 mg, Oral, Q8H PRN, Medardo Contreras PA-C    calcium carbonate-vitamin D (OSCAL-D) 500 mg-200 units per tablet 1 tablet, 1 tablet, Oral, BID With Meals, Paras Souza MD, 1 tablet at 07/20/18 0900    chlorproMAZINE (THORAZINE) tablet 50 mg, 50 mg, Oral, BID, Misty Buck PA-C, 50 mg at 07/20/18 0900    cholecalciferol (VITAMIN D3) tablet 1,000 Units, 1,000 Units, Oral, Daily, Juan Antonio Rios DO, 1,000 Units at 07/20/18 0900    diphenoxylate-atropine (LOMOTIL) 2 5-0 025 mg per tablet 1 tablet, 1 tablet, Oral, 4x Daily PRN, Kary Jackson PA-C, 1 tablet at 07/20/18 0114    divalproex sodium (DEPAKOTE) EC tablet 250 mg, 250 mg, Oral, Daily, Medardo Contreras PA-C, 250 mg at 07/20/18 0900    divalproex sodium (DEPAKOTE) EC tablet 500 mg, 500 mg, Oral, HS, Juan Antonio Rios DO, 500 mg at 07/19/18 2138    fexofenadine (ALLEGRA) tablet 180 mg, 180 mg, Oral, Daily, Paras Souza MD, 180 mg at 57/48/32 9127    folic acid (FOLVITE) tablet 1 mg, 1 mg, Oral, Daily, Juan Antonio Rios DO, 1 mg at 07/20/18 0900    levothyroxine tablet 25 mcg, 25 mcg, Oral, Every Other Day, Juan Antonio Rios DO, 25 mcg at 07/19/18 7828    levothyroxine tablet 50 mcg, 50 mcg, Oral, Every Other Day, Juan Antonio Rios DO, 50 mcg at 07/20/18 0544    LORazepam (ATIVAN) tablet 0 5 mg, 0 5 mg, Oral, Q6H PRN, Lit Birmingham PA-C    magnesium hydroxide (MILK OF MAGNESIA) 400 mg/5 mL oral suspension 30 mL, 30 mL, Oral, Daily PRN, Sarah Urbina PA-C    melatonin tablet 3 mg, 3 mg, Oral, HS, Juan Antonio Gabriel, DO, 3 mg at 07/19/18 2138    metoprolol tartrate (LOPRESSOR) tablet 25 mg, 25 mg, Oral, Q12H [facility], Juan Antonio Rios, DO, 25 mg at 07/20/18 0900    multivitamin stress formula tablet 1 tablet, 1 tablet, Oral, Daily, Pat Aschoff, DO, 1 tablet at 07/19/18 7800    nicotine polacrilex (NICORETTE) gum 2 mg, 2 mg, Oral, Q2H PRN, Sarah Urbina PA-C    [START ON 7/21/2018] NON FORMULARY, 35 mg, Oral, Weekly, KESHAV Jeter    polyethylene glycol-propylene glycol (SYSTANE) 0 4-0 3 % ophthalmic solution 2 drop, 2 drop, Both Eyes, 4x Daily PRN, KESHAV Jeter    senna-docusate sodium (SENOKOT S) 8 6-50 mg per tablet 1 tablet, 1 tablet, Oral, BID, KESHAV Jeter, 1 tablet at 07/18/18 1906    temazepam (RESTORIL) capsule 15 mg, 15 mg, Oral, HS, Juan Antonio Rios, DO, 15 mg at 07/19/18 2138    traZODone (DESYREL) tablet 50 mg, 50 mg, Oral, HS PRN, Sarah Urbina PA-C    triamcinolone (KENALOG) 0 025 % cream, , Topical, BID, Elizabeth Lau MD      Objective:     Vital Signs:  Vitals:    07/19/18 1500 07/19/18 2100 07/20/18 0500 07/20/18 0700   BP: 122/58 123/54  110/55   BP Location: Left arm Left arm     Pulse: 81 89  67   Resp: 20   18   Temp: (!) 97 1 °F (36 2 °C)   (!) 96 5 °F (35 8 °C)   TempSrc: Temporal   Temporal   SpO2: 100%   99%   Weight:   56 kg (123 lb 8 oz)    Height:             Appearance:  age appropriate and casually dressed   Behavior:  normal   Speech:  normal volume   Mood:  labile   Affect:  labile   Thought Process:  flight of ideas, loose associations and perserverative   Thought Content:  normal   Perceptual Disturbances: None   Risk Potential: none   Sensorium:  person, place, situation and time   Cognition:  intact   Consciousness:  alert and awake    Attention: attention span appeared shorter than expected for age   Intellect: average   Insight:  limited   Judgment: limited      Motor Activity: no abnormal movements           Recent Labs:  Results Reviewed     None          I/O Past 24 hours:  I/O last 3 completed shifts: In: 1340 [P O :1340]  Out: -   No intake/output data recorded  Assessment / Plan:     Bipolar 1 disorder ([facility] )    Recommended Treatment:      Medication changes:  1) Continue current medication regimen  Non-pharmacological treatments  1) Continue with group therapy, milieu therapy and occupational therapy  Safety  1) Safety/communication plan established targeting dynamic risk factors above  2) Risks, benefits, and possible side effects of medications explained to patient and patient verbalizes understanding  Counseling / Coordination of Care    Total floor / unit time spent today 20 minutes  Greater than 50% of total time was spent with the patient and / or family counseling and / or coordination of care  A description of the counseling / coordination of care  Patient's Rights, confidentiality and exceptions to confidentiality, use of automated medical record, [facility] staff access to medical record, and consent to treatment reviewed      Royal Hannah PA-C
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
## 2018-07-20 NOTE — PROGRESS NOTES
Patient is alert and oriented  Patient is labile and unpredictable  Patient manic this shift and attention seeking  Patient continues to have flights of ideas and delusions of living with grandson on sofa  Patient continues to have poor insight  Q 15 min checks continue for safety and support

## 2018-07-20 NOTE — OCCUPATIONAL THERAPY NOTE
Occupational Therapy Activity Treatment Note      Castro Tariq    7/20/2018    Patient Active Problem List   Diagnosis    Bipolar 1 disorder ([facility] )    Essential hypertension    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HCC)    Hypothyroidism    [facility]    Hyponatremia    Loose stools    Chronic leg pain    Seasonal allergies    Bilateral dry eyes    Age-related osteoporosis without current pathological fracture       Past Medical History:   Diagnosis Date    [facility]     Essential hypertension     Hypothyroidism     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[facility] )        Past Surgical History:   Procedure Laterality Date    TONSILLECTOMY      TOTAL HIP ARTHROPLASTY Right         07/20/18 1310   Assessment   Assessment Casper Frankel was approached for involvement in activity  She did engage in discussion  She talked with this writer about her living situation, that she did not plan to go back to her home with her   She stated that she had had a meeting with the , a family member, and herself earlier (not this day)  She stated that she would probably be leaving soon  When asked where she planned to go, she started to talk about her sister who lives locally  She did provide some details, but she would digress from topic and appeared to have challenge with getting to the point  However, she also discussed things important to good health, I e , eating correctly, spirituality  She was also friendly and assistive to another patient in the room  She appeared to be in good spirits, and her mood appeared to be somewhat calmer  She was encouraged to also attend to an art activity or game, but she preferred conversation as this related to her current situation  Progress has been slow but consistent towards her goal  Encourage Casper Frankel to regular attend and invest in group process and OT involvement  Plan   Treatment Interventions ADL retraining;Continued evaluation; Activityengagement  (coping skills training, life management training)   Goal Expiration Date 08/12/18   Treatment Day (day 8)   OT Frequency 5x/wk   Melinda Lawrence OT

## 2018-07-20 NOTE — PLAN OF CARE
ANXIETY     Will report anxiety at manageable levels Progressing     By discharge: Patient will verbalize 2 strategies to deal with anxiety Progressing        BEHAVIOR     Pt/Family maintain appropriate behavior and adhere to behavioral management agreement, if implemented [place] Discharge to home or other facility with appropriate resources Progressing        Nutrition/Hydration-ADULT     Nutrient/Hydration intake appropriate for improving, restoring or maintaining nutritional needs Progressing        PAIN - ADULT     Verbalizes/displays adequate comfort level or baseline comfort level Progressing        Prexisting or High Potential for Compromised Skin Integrity     Skin integrity is maintained or improved Progressing        Prexisting or High Potential for Compromised Skin Integrity     Skin integrity is maintained or improved Progressing        SAFETY ADULT     Patient will remain free of falls Progressing

## 2018-07-21 LAB — HEMOCCULT STL QL: NEGATIVE

## 2018-07-21 PROCEDURE — 82272 OCCULT BLD FECES 1-3 TESTS: CPT | Performed by: NURSE PRACTITIONER

## 2018-07-21 RX ADMIN — FOLIC ACID 1 MG: 1 TABLET ORAL at 08:03

## 2018-07-21 RX ADMIN — B-COMPLEX W/ C & FOLIC ACID TAB 1 TABLET: TAB at 08:02

## 2018-07-21 RX ADMIN — LEVOTHYROXINE SODIUM 25 MCG: 25 TABLET ORAL at 06:47

## 2018-07-21 RX ADMIN — METOPROLOL TARTRATE 25 MG: 25 TABLET, FILM COATED ORAL at 08:02

## 2018-07-21 RX ADMIN — OYSTER SHELL CALCIUM WITH VITAMIN D 1 TABLET: 500; 200 TABLET, FILM COATED ORAL at 08:03

## 2018-07-21 RX ADMIN — MELATONIN 3 MG: 3 TAB ORAL at 21:31

## 2018-07-21 RX ADMIN — FEXOFENADINE HCL 180 MG: 180 TABLET ORAL at 12:29

## 2018-07-21 RX ADMIN — CHLORPROMAZINE HYDROCHLORIDE 50 MG: 25 TABLET, SUGAR COATED ORAL at 17:12

## 2018-07-21 RX ADMIN — DIVALPROEX SODIUM 500 MG: 500 TABLET, DELAYED RELEASE ORAL at 21:32

## 2018-07-21 RX ADMIN — METOPROLOL TARTRATE 25 MG: 25 TABLET, FILM COATED ORAL at 21:32

## 2018-07-21 RX ADMIN — AMLODIPINE BESYLATE 5 MG: 5 TABLET ORAL at 08:03

## 2018-07-21 RX ADMIN — TEMAZEPAM 15 MG: 15 CAPSULE ORAL at 21:31

## 2018-07-21 RX ADMIN — TRIAMCINOLONE ACETONIDE: 0.25 CREAM TOPICAL at 08:09

## 2018-07-21 RX ADMIN — SENNOSIDES AND DOCUSATE SODIUM 1 TABLET: 8.6; 5 TABLET ORAL at 08:03

## 2018-07-21 RX ADMIN — VITAMIN D, TAB 1000IU (100/BT) 1000 UNITS: 25 TAB at 08:03

## 2018-07-21 RX ADMIN — DIVALPROEX SODIUM 250 MG: 250 TABLET, DELAYED RELEASE ORAL at 08:03

## 2018-07-21 RX ADMIN — OYSTER SHELL CALCIUM WITH VITAMIN D 1 TABLET: 500; 200 TABLET, FILM COATED ORAL at 16:29

## 2018-07-21 RX ADMIN — SENNOSIDES AND DOCUSATE SODIUM 1 TABLET: 8.6; 5 TABLET ORAL at 17:13

## 2018-07-21 RX ADMIN — CHLORPROMAZINE HYDROCHLORIDE 50 MG: 25 TABLET, SUGAR COATED ORAL at 08:02

## 2018-07-21 NOTE — PROGRESS NOTES
Pt awake ,alert, oriented, visible on the unit, intrusive at times, laughs inappropriately during assessment, hyperverbal at times with flight of ideas  Fixated in her "high blood pressure this morning" and her medications  Redirectable  Behavior control encouraged  Q15 min checks maintained  Will continue to monitor

## 2018-07-21 NOTE — PROGRESS NOTES
Psychiatry Progress Note    Subjective: Interval History     Patient fixated on her home situation at this time  She reports that she is okay to return home with her  has to leave the house  Today she is stating that she does not care where he goes however she feels that he will be going to [place] to their condo down there  The patient continues to be suspicious and guarded surrounding her   Her mood continues to be labile and unpredictable at times  She has been less somatically preoccupied throughout the interview today  She tolerated all of her medications well without side effect        Current medications:    Current Facility-Administered Medications:     acetaminophen (TYLENOL) tablet 650 mg, 650 mg, Oral, Q4H PRN, Darron Lin PA-C    amLODIPine (NORVASC) tablet 5 mg, 5 mg, Oral, Daily, Juan Antonio Rios DO, 5 mg at 07/21/18 0803    benztropine (COGENTIN) tablet 1 mg, 1 mg, Oral, Q8H PRN, Darron Lin PA-C    calcium carbonate-vitamin D (OSCAL-D) 500 mg-200 units per tablet 1 tablet, 1 tablet, Oral, BID With Meals, Arturo Moreno MD, 1 tablet at 07/21/18 0803    chlorproMAZINE (THORAZINE) tablet 50 mg, 50 mg, Oral, BID, Michelle Valderrama PA-C, 50 mg at 07/21/18 0802    cholecalciferol (VITAMIN D3) tablet 1,000 Units, 1,000 Units, Oral, Daily, Cozecarline Blount DO, 1,000 Units at 07/21/18 0803    diphenoxylate-atropine (LOMOTIL) 2 5-0 025 mg per tablet 1 tablet, 1 tablet, Oral, 4x Daily PRN, Michelle Valderrama PA-C, 1 tablet at 07/20/18 0114    divalproex sodium (DEPAKOTE) EC tablet 250 mg, 250 mg, Oral, Daily, Darron Lin PA-C, 250 mg at 07/21/18 0803    divalproex sodium (DEPAKOTE) EC tablet 500 mg, 500 mg, Oral, HS, Juan Antonio Rios DO, 500 mg at 07/20/18 2156    fexofenadine (ALLEGRA) tablet 180 mg, 180 mg, Oral, Daily, Arturo Moreno MD, 180 mg at 47/46/38 5288    folic acid (FOLVITE) tablet 1 mg, 1 mg, Oral, Daily, Juan Antonio Rios DO, 1 mg at 07/21/18 0803   levothyroxine tablet 25 mcg, 25 mcg, Oral, Every Other Day, Rogelio Vázquez, , 25 mcg at 07/21/18 9133    levothyroxine tablet 50 mcg, 50 mcg, Oral, Every Other Day, Juan Antonio Rios DO, 50 mcg at 07/20/18 0544    LORazepam (ATIVAN) tablet 0 5 mg, 0 5 mg, Oral, Q6H PRN, Manav Epstein PA-C    magnesium hydroxide (MILK OF MAGNESIA) 400 mg/5 mL oral suspension 30 mL, 30 mL, Oral, Daily PRN, Poltlaia Schulz PA-C    melatonin tablet 3 mg, 3 mg, Oral, HS, Juan Antonio Rios, DO, 3 mg at 07/20/18 2156    metoprolol tartrate (LOPRESSOR) tablet 25 mg, 25 mg, Oral, Q12H [address], Juan Antonio Rios, DO, 25 mg at 07/21/18 0802    multivitamin stress formula tablet 1 tablet, 1 tablet, Oral, Daily, Rogelio Vázquez DO, 1 tablet at 07/21/18 0802    nicotine polacrilex (NICORETTE) gum 2 mg, 2 mg, Oral, Q2H PRN, Hadley Schulz PA-C    NON FORMULARY, 35 mg, Oral, Weekly, KESHAV Jeter, Stopped at 07/21/18 0810    polyethylene glycol-propylene glycol (SYSTANE) 0 4-0 3 % ophthalmic solution 2 drop, 2 drop, Both Eyes, 4x Daily PRN, Rubi Ciminidorothea CRNP, 2 drop at 07/21/18 0803    senna-docusate sodium (SENOKOT S) 8 6-50 mg per tablet 1 tablet, 1 tablet, Oral, BID, Rubi Ciminidorothea, CRNP, 1 tablet at 07/21/18 0803    temazepam (RESTORIL) capsule 15 mg, 15 mg, Oral, HS, Juan Antonio Rios DO, 15 mg at 07/20/18 2157    traZODone (DESYREL) tablet 50 mg, 50 mg, Oral, HS PRN, Polo Jazz, PA-C    triamcinolone (KENALOG) 0 025 % cream, , Topical, BID, Celio Banks MD      Objective:     Vital Signs:  Vitals:    07/20/18 1607 07/20/18 2157 07/21/18 0710 07/21/18 0802   BP: 141/63 100/70 157/72 157/72   BP Location: Left arm  Right arm    Pulse: 82 75 87 87   Resp: 18  18    Temp: 98 6 °F (37 °C)  (!) 96 8 °F (36 °C)    TempSrc: Temporal  Temporal    SpO2: 98%  99%    Weight:       Height:             Appearance:  age appropriate and casually dressed   Behavior:  normal   Speech:  normal volume   Mood:  labile   Affect:  labile Thought Process:  flight of ideas, loose associations and perserverative   Thought Content:  normal   Perceptual Disturbances: None   Risk Potential: none   Sensorium:  person, place, situation and time   Cognition:  intact   Consciousness:  alert and awake    Attention: attention span appeared shorter than expected for age   Intellect: average   Insight:  limited   Judgment: limited      Motor Activity: no abnormal movements           Recent Labs:  Results Reviewed     None          I/O Past 24 hours:  I/O last 3 completed shifts: In: 0 [P O :880]  Out: -   I/O this shift:  In: 120 [P O :120]  Out: -         Assessment / Plan:     [facility]    Recommended Treatment:      Medication changes:  1) Continue current medication regimen  Non-pharmacological treatments  1) Continue with group therapy, milieu therapy and occupational therapy  Safety  1) Safety/communication plan established targeting dynamic risk factors above  2) Risks, benefits, and possible side effects of medications explained to patient and patient verbalizes understanding  Counseling / Coordination of Care    Total floor / unit time spent today 20 minutes  Greater than 50% of total time was spent with the patient and / or family counseling and / or coordination of care  A description of the counseling / coordination of care  Patient's Rights, confidentiality and exceptions to confidentiality, use of automated medical record, [facility] staff access to medical record, and consent to treatment reviewed      Hadley Schulz PA-C

## 2018-07-21 NOTE — NURSING NOTE
Patient has been visible in unit, awake and pleasant this a m  Intrusive at the nursing stations and preoccupied about medications and breakfast this a m, has been redirectable without agitation  Patient has been compliant with medications and meals  On safe protocol and routine checks, will continue to monitor

## 2018-07-21 NOTE — PROGRESS NOTES
07/21/18 0900   Activity/Group Checklist   Group Other (Comment)  (Art Therapy Process Group / Open Choice)   Attendance Attended   Attendance Duration (min) 46-60   Interactions Interacted appropriately   Affect/Mood Appropriate   Goals Achieved Able to engage in interactions; Able to listen to others; Able to self-disclose; Able to recieve feedback; Able to give feedback to another  (Able to engage in materials)     Patient left early and came back ten minutes before group was finished  She engaged in art-making and interacted in conversations     N Jose C AT Suellen Calloway MPS, ATR-BC

## 2018-07-21 NOTE — PROGRESS NOTES
Slept well  Awake this Am   Had formed BM specimen sent for occult  Pt requested lomotil for diarrhea  I informed her it wasn't diarrhea or loose, that it was formed

## 2018-07-22 RX ADMIN — LEVOTHYROXINE SODIUM 50 MCG: 25 TABLET ORAL at 05:47

## 2018-07-22 RX ADMIN — METOPROLOL TARTRATE 25 MG: 25 TABLET, FILM COATED ORAL at 22:34

## 2018-07-22 RX ADMIN — TEMAZEPAM 15 MG: 15 CAPSULE ORAL at 22:34

## 2018-07-22 RX ADMIN — DIPHENOXYLATE HYDROCHLORIDE AND ATROPINE SULFATE 1 TABLET: 2.5; .025 TABLET ORAL at 03:25

## 2018-07-22 RX ADMIN — MELATONIN 3 MG: 3 TAB ORAL at 22:34

## 2018-07-22 RX ADMIN — CHLORPROMAZINE HYDROCHLORIDE 50 MG: 25 TABLET, SUGAR COATED ORAL at 17:28

## 2018-07-22 RX ADMIN — CHLORPROMAZINE HYDROCHLORIDE 50 MG: 25 TABLET, SUGAR COATED ORAL at 09:11

## 2018-07-22 RX ADMIN — OYSTER SHELL CALCIUM WITH VITAMIN D 1 TABLET: 500; 200 TABLET, FILM COATED ORAL at 17:28

## 2018-07-22 RX ADMIN — AMLODIPINE BESYLATE 5 MG: 5 TABLET ORAL at 09:12

## 2018-07-22 RX ADMIN — B-COMPLEX W/ C & FOLIC ACID TAB 1 TABLET: TAB at 09:11

## 2018-07-22 RX ADMIN — VITAMIN D, TAB 1000IU (100/BT) 1000 UNITS: 25 TAB at 09:12

## 2018-07-22 RX ADMIN — METOPROLOL TARTRATE 25 MG: 25 TABLET, FILM COATED ORAL at 09:13

## 2018-07-22 RX ADMIN — OYSTER SHELL CALCIUM WITH VITAMIN D 1 TABLET: 500; 200 TABLET, FILM COATED ORAL at 09:14

## 2018-07-22 RX ADMIN — FOLIC ACID 1 MG: 1 TABLET ORAL at 09:12

## 2018-07-22 RX ADMIN — DIVALPROEX SODIUM 250 MG: 250 TABLET, DELAYED RELEASE ORAL at 09:13

## 2018-07-22 RX ADMIN — DIVALPROEX SODIUM 500 MG: 500 TABLET, DELAYED RELEASE ORAL at 22:34

## 2018-07-22 RX ADMIN — FEXOFENADINE HCL 180 MG: 180 TABLET ORAL at 13:10

## 2018-07-22 NOTE — NURSING NOTE
Patient is anxious and worries all the time about her BM  Talkative and laughing to anything she said  She reported to the nurse that she didn't sleep at night but doesn't take a nap  Patient is visible on the unit, cooperative with pills

## 2018-07-22 NOTE — PROGRESS NOTES
Psychiatry Progress Note    Subjective: Interval History     Patient continues to be intrusive at times  Has been noted to be laughing inappropriately at times  Still with episodes of being hyperverbal with flight of ideas and circumstantial thought processes  Continues to be somatically preoccupied at times and is worried about her blood pressure yesterday  She did sleep well last night  She has been tolerating her medications well without side effect        Current medications:    Current Facility-Administered Medications:     acetaminophen (TYLENOL) tablet 650 mg, 650 mg, Oral, Q4H PRN, Eduar Steward PA-C    amLODIPine (NORVASC) tablet 5 mg, 5 mg, Oral, Daily, Juan Antonio Rios DO, 5 mg at 07/22/18 0912    benztropine (COGENTIN) tablet 1 mg, 1 mg, Oral, Q8H PRN, Eduar Steward PA-C    calcium carbonate-vitamin D (OSCAL-D) 500 mg-200 units per tablet 1 tablet, 1 tablet, Oral, BID With Meals, Vin Palumbo MD, 1 tablet at 07/22/18 0914    chlorproMAZINE (THORAZINE) tablet 50 mg, 50 mg, Oral, BID, Ildefonso Ferrell PA-C, 50 mg at 07/22/18 0911    cholecalciferol (VITAMIN D3) tablet 1,000 Units, 1,000 Units, Oral, Daily, Gia Mirza DO, 1,000 Units at 07/22/18 0912    diphenoxylate-atropine (LOMOTIL) 2 5-0 025 mg per tablet 1 tablet, 1 tablet, Oral, 4x Daily PRN, Ildefonso Ferrell PA-C, 1 tablet at 07/22/18 0325    divalproex sodium (DEPAKOTE) EC tablet 250 mg, 250 mg, Oral, Daily, Eduar Steward PA-C, 250 mg at 07/22/18 0913    divalproex sodium (DEPAKOTE) EC tablet 500 mg, 500 mg, Oral, HS, Juan Antonio Rios DO, 500 mg at 07/21/18 2132    fexofenadine (ALLEGRA) tablet 180 mg, 180 mg, Oral, Daily, Vin Palumbo MD, 180 mg at 15/94/33 9919    folic acid (FOLVITE) tablet 1 mg, 1 mg, Oral, Daily, Juan Antonio Rios DO, 1 mg at 07/22/18 3949    levothyroxine tablet 25 mcg, 25 mcg, Oral, Every Other Day, Juan Antonio Rios DO, 25 mcg at 07/21/18 0647    levothyroxine tablet 50 mcg, 50 mcg, Oral, Every Other Day, Gia Stands, DO, 50 mcg at 07/22/18 0547    LORazepam (ATIVAN) tablet 0 5 mg, 0 5 mg, Oral, Q6H PRN, Ildefonso Ferrell PA-C    magnesium hydroxide (MILK OF MAGNESIA) 400 mg/5 mL oral suspension 30 mL, 30 mL, Oral, Daily PRN, Eduar Steward PA-C    melatonin tablet 3 mg, 3 mg, Oral, HS, Juan Antonio Gabriel, DO, 3 mg at 07/21/18 2131    metoprolol tartrate (LOPRESSOR) tablet 25 mg, 25 mg, Oral, Q12H [address], Juan Antonio Gabriel, DO, 25 mg at 07/22/18 0913    multivitamin stress formula tablet 1 tablet, 1 tablet, Oral, Daily, Gia Stands, DO, 1 tablet at 07/22/18 0911    nicotine polacrilex (NICORETTE) gum 2 mg, 2 mg, Oral, Q2H PRN, Eduar Steward PA-C    NON FORMULARY, 35 mg, Oral, Weekly, KESHAV Jeter, Stopped at 07/21/18 0810    polyethylene glycol-propylene glycol (SYSTANE) 0 4-0 3 % ophthalmic solution 2 drop, 2 drop, Both Eyes, 4x Daily PRN, KESHAV Jeter, 2 drop at 07/22/18 0601    senna-docusate sodium (SENOKOT S) 8 6-50 mg per tablet 1 tablet, 1 tablet, Oral, BID, MAURA JeterNP, 1 tablet at 07/21/18 1713    temazepam (RESTORIL) capsule 15 mg, 15 mg, Oral, HS, Juan Antonio Rios, DO, 15 mg at 07/21/18 2131    traZODone (DESYREL) tablet 50 mg, 50 mg, Oral, HS PRN, Eduar Steward PA-C    triamcinolone (KENALOG) 0 025 % cream, , Topical, BID, Vin Palumbo MD      Objective:     Vital Signs:  Vitals:    07/21/18 1537 07/21/18 2131 07/22/18 0715 07/22/18 0913   BP: 164/76 126/54 125/76 125/76   BP Location: Left arm  Left arm    Pulse: 84 89 67 67   Resp: 18  18    Temp: (!) 97 4 °F (36 3 °C)  (!) 96 6 °F (35 9 °C)    TempSrc: Temporal  Temporal    SpO2: 98%  98%    Weight:       Height:             Appearance:  age appropriate and casually dressed   Behavior:  normal   Speech:  normal volume   Mood:  labile   Affect:  labile   Thought Process:  flight of ideas, loose associations and perserverative   Thought Content:  normal   Perceptual Disturbances: None   Risk Potential: none   Sensorium:  person, place, situation and time   Cognition:  intact   Consciousness:  alert and awake    Attention: attention span appeared shorter than expected for age   Intellect: average   Insight:  limited   Judgment: limited      Motor Activity: no abnormal movements           Recent Labs:  Results Reviewed     None          I/O Past 24 hours:  I/O last 3 completed shifts: In: 600 [P O :600]  Out: -   I/O this shift:  In: 360 [P O :360]  Out: -         Assessment / Plan:     [facility]    Recommended Treatment:      Medication changes:  1) Continue current medication regimen  Non-pharmacological treatments  1) Continue with group therapy, milieu therapy and occupational therapy  Safety  1) Safety/communication plan established targeting dynamic risk factors above  2) Risks, benefits, and possible side effects of medications explained to patient and patient verbalizes understanding  Counseling / Coordination of Care    Total floor / unit time spent today 20 minutes  Greater than 50% of total time was spent with the patient and / or family counseling and / or coordination of care  A description of the counseling / coordination of care  Patient's Rights, confidentiality and exceptions to confidentiality, use of automated medical record, [facility] erick staff access to medical record, and consent to treatment reviewed      Braeden Moncada PA-C

## 2018-07-22 NOTE — PROGRESS NOTES
Pt visible on the unit, intrusive at times, with numerous requests and complaints, like feeling "too cold" and asking staff why "her temperature is so low "  Med compliant, with good appetite at dinner  Pt with irritable edge at times, redirectable  Safety checks maintained, will continue to monitor

## 2018-07-22 NOTE — PROGRESS NOTES
Pt appears to be sleeping well with non labored symmetrical breathing  No behavrioal issues, 15 min checks maintained for safety and support

## 2018-07-23 PROBLEM — R19.5 LOOSE STOOLS: Status: RESOLVED | Noted: 2018-07-12 | Resolved: 2018-07-23

## 2018-07-23 PROBLEM — R68.83 CHILLS (WITHOUT FEVER): Status: ACTIVE | Noted: 2018-07-23

## 2018-07-23 PROCEDURE — 97150 GROUP THERAPEUTIC PROCEDURES: CPT

## 2018-07-23 PROCEDURE — 99232 SBSQ HOSP IP/OBS MODERATE 35: CPT | Performed by: NURSE PRACTITIONER

## 2018-07-23 RX ORDER — CHLORPROMAZINE HYDROCHLORIDE 25 MG/1
75 TABLET, FILM COATED ORAL 2 TIMES DAILY
Status: DISCONTINUED | OUTPATIENT
Start: 2018-07-23 | End: 2018-07-26 | Stop reason: HOSPADM

## 2018-07-23 RX ADMIN — MELATONIN 3 MG: 3 TAB ORAL at 23:12

## 2018-07-23 RX ADMIN — AMLODIPINE BESYLATE 5 MG: 5 TABLET ORAL at 09:27

## 2018-07-23 RX ADMIN — CHLORPROMAZINE HYDROCHLORIDE 75 MG: 25 TABLET, SUGAR COATED ORAL at 18:05

## 2018-07-23 RX ADMIN — FEXOFENADINE HCL 180 MG: 180 TABLET ORAL at 12:05

## 2018-07-23 RX ADMIN — DIVALPROEX SODIUM 250 MG: 250 TABLET, DELAYED RELEASE ORAL at 09:28

## 2018-07-23 RX ADMIN — OYSTER SHELL CALCIUM WITH VITAMIN D 1 TABLET: 500; 200 TABLET, FILM COATED ORAL at 18:05

## 2018-07-23 RX ADMIN — CHLORPROMAZINE HYDROCHLORIDE 50 MG: 25 TABLET, SUGAR COATED ORAL at 09:29

## 2018-07-23 RX ADMIN — OYSTER SHELL CALCIUM WITH VITAMIN D 1 TABLET: 500; 200 TABLET, FILM COATED ORAL at 09:28

## 2018-07-23 RX ADMIN — B-COMPLEX W/ C & FOLIC ACID TAB 1 TABLET: TAB at 09:29

## 2018-07-23 RX ADMIN — FOLIC ACID 1 MG: 1 TABLET ORAL at 09:29

## 2018-07-23 RX ADMIN — VITAMIN D, TAB 1000IU (100/BT) 1000 UNITS: 25 TAB at 09:29

## 2018-07-23 RX ADMIN — TEMAZEPAM 15 MG: 15 CAPSULE ORAL at 23:11

## 2018-07-23 RX ADMIN — METOPROLOL TARTRATE 25 MG: 25 TABLET, FILM COATED ORAL at 09:29

## 2018-07-23 RX ADMIN — TRIAMCINOLONE ACETONIDE: 0.25 CREAM TOPICAL at 09:30

## 2018-07-23 RX ADMIN — METOPROLOL TARTRATE 25 MG: 25 TABLET, FILM COATED ORAL at 23:13

## 2018-07-23 RX ADMIN — LEVOTHYROXINE SODIUM 25 MCG: 25 TABLET ORAL at 06:03

## 2018-07-23 RX ADMIN — DIPHENOXYLATE HYDROCHLORIDE AND ATROPINE SULFATE 1 TABLET: 2.5; .025 TABLET ORAL at 01:55

## 2018-07-23 RX ADMIN — DIVALPROEX SODIUM 500 MG: 500 TABLET, DELAYED RELEASE ORAL at 23:13

## 2018-07-23 NOTE — SOCIAL WORK
SW spoke with pt grandsalvador Amaro  He stated that pt has been calling saying she is being DC which is not accurate  SW clarified DC plan  Estefany Alfaro states that he does not feel pt is psychiatrically stable  DC plan remains same home with family support  SW will continue to follow up

## 2018-07-23 NOTE — PROGRESS NOTES
Progress Note - Kai Delgado 1940, 65 y o  female MRN: [id number]    Unit/Bed#: [facility] 078-45 Encounter: [id number]    Primary Care Provider: Tripp Jacobo MD   Date and time admitted to hospital: 7/10/2018  8:47 PM        Chills (without fever)   Assessment & Plan    Pt reports chills  No fever  States she always has body aches  No weakness, congestions, sore throat, shortness of breath, N/V/D  Will repeat labs in the AM and continue to monitor  Hyponatremia   Assessment & Plan    HCTZ d/c'd 7/11/18 secondary to hyponatremia  Will repeat labs in AM and continue to monitor  Urine studies completed  Likely 2/2 HCTZ  Also on depakote  Essential hypertension   Assessment & Plan    Stable on metoprolol and norvasc  HCTZ was d/c'd for hyponatremia  Sodium level improving, and will repeat labs in AM         Hypothyroidism   Assessment & Plan    Continue levothyroxine alternating doses of 25/50mcg every other day  TSH was wnl  Loose stoolsresolved as of 7/23/2018   Assessment & Plan    Resolved        Chronic leg pain   Assessment & Plan    Patient reports chronic leg and foot pain  She has prn tylenol and triamcinolone cream that was previously prescribed outpatient for rash  She has MARTHA stocking on at this time with no complaints  Age-related osteoporosis without current pathological fracture   Assessment & Plan    Takes fosamax every Saturday, however, originally told we were unable to administer per hospital policy  She is on calcium and vitamin D  Pt insisted on taking her fosamax, and approval was obtained to enter non-formulary order for pt to take her own medication  Pt aware that she must sit upright and take with 8oz of water  She is compliant with her medication, and very particular about following instructions  Bilateral dry eyes   Assessment & Plan    Artificial tears ordered   Non-formulary order entered for pt to take her own meds Seasonal allergies   Assessment & Plan    Pt takes Allegra, and is refusing substitution  Non-formulary order entered for her to resume her own medication  Bipolar depression ([address] )   Assessment & Plan    Management per psych  Parkinsonian tremor [facility])   Steven Reese outpatient  Stable at this time, and not on any medications  * Bipolar 1 disorder [facility])   Assessment & Plan    Management per psych  Continues to have multiple somatic complaints with attention seeking behavior  VTE Pharmacologic Prophylaxis:   Pharmacologic: Pt is ambulatory  Mechanical VTE Prophylaxis in Place: No    Patient Centered Rounds: I have performed bedside rounds with nursing staff today  Education and Discussions with Family / Patient: Plan of care discussed with pt  Time Spent for Care: 20 minutes  More than 50% of total time spent on counseling and coordination of care as described above  Current Length of Stay: 13 day(s)    Current Patient Status: Inpatient Psych   Certification Statement: The patient will continue to require additional inpatient hospital stay due to psychiatric illness    Discharge Plan: Per primary team when stable    Code Status: Level 1 - Full Code      Subjective:   Pt c/o chills  No fever, CP, SOB, N/V/D, constipation, or dysuria  No cough, congestion, or sore throat  She states she always has body aches  Denies weakness and fatigue  Ambulates frequently  Objective:     Vitals:   Temp (24hrs), Av 3 °F (36 3 °C), Min:97 1 °F (36 2 °C), Max:97 4 °F (36 3 °C)    HR:  [65-84] 65  Resp:  [18] 18  BP: (117-136)/(51-72) 136/64  SpO2:  [98 %-99 %] 99 %  Body mass index is 21 61 kg/m²  Input and Output Summary (last 24 hours):        Intake/Output Summary (Last 24 hours) at 18 1432  Last data filed at 18 1100   Gross per 24 hour   Intake             1380 ml   Output                0 ml   Net             1380 ml       Physical Exam:     Physical Exam   Constitutional: She is oriented to person, place, and time  She appears well-developed and well-nourished  No distress  HENT:   Head: Normocephalic and atraumatic  Eyes: Right eye exhibits no discharge  Left eye exhibits no discharge  Neck: No JVD present  Cardiovascular: Normal rate, regular rhythm, normal heart sounds and intact distal pulses  Exam reveals no gallop and no friction rub  No murmur heard  Pulmonary/Chest: Effort normal and breath sounds normal  No respiratory distress  She has no wheezes  She has no rales  She exhibits no tenderness  Abdominal: Soft  Bowel sounds are normal  She exhibits no distension  There is no tenderness  There is no rebound and no guarding  Musculoskeletal: She exhibits no edema  MARTHA stockings to BLE's   Neurological: She is alert and oriented to person, place, and time  Skin: Skin is warm and dry  She is not diaphoretic  Psychiatric:   Anxious affect  Additional Data:     Labs:      Results from last 7 days  Lab Units 07/18/18  1107   WBC Thousand/uL 7 30   HEMOGLOBIN g/dL 11 6*   HEMATOCRIT % 33 8*   PLATELETS Thousands/uL 294   LYMPHO PCT % 21   MONO PCT MAN % 9   EOSINO PCT MANUAL % 1       Results from last 7 days  Lab Units 07/18/18  1038   SODIUM mmol/L 133*   POTASSIUM mmol/L 4 7   CHLORIDE mmol/L 93*   CO2 mmol/L 36*   BUN mg/dL 29*   CREATININE mg/dL 0 59*   CALCIUM mg/dL 9 1   GLUCOSE RANDOM mg/dL 92                     * I Have Reviewed All Lab Data Listed Above  * Additional Pertinent Lab Tests Reviewed:  Most recent labs reviewed    Imaging:    Imaging Reports Reviewed Today Include: none  Imaging Personally Reviewed by Myself Includes:  none    Recent Cultures (last 7 days):           Last 24 Hours Medication List:     Current Facility-Administered Medications:  acetaminophen 650 mg Oral Q4H PRN Angelina Billy PA-C   amLODIPine 5 mg Oral Daily Juan Antonio Rios DO   benztropine 1 mg Oral Q8H PRN Leti Valdez PA-C   calcium carbonate-vitamin D 1 tablet Oral BID With Meals Brian Aguilar MD   chlorproMAZINE 75 mg Oral BID Merryl FortsKYE   cholecalciferol 1,000 Units Oral Daily Amber Robertson, DO   diphenoxylate-atropine 1 tablet Oral 4x Daily PRN Merryl KYE Kay   divalproex sodium 250 mg Oral Daily Leti Valdez PA-C   divalproex sodium 500 mg Oral HS Juan Antonio Rios, DO   fexofenadine 180 mg Oral Daily Brian Aguilar MD   folic acid 1 mg Oral Daily Amber Robertson, DO   levothyroxine 25 mcg Oral Every Other Day Juan Antonio Rios, DO   levothyroxine 50 mcg Oral Every Other Day Juan Antonio Rios, DO   LORazepam 0 5 mg Oral Q6H PRN Merryl FortsKYE   magnesium hydroxide 30 mL Oral Daily PRN Leti Valdez PA-C   melatonin 3 mg Oral HS Juan Antonio Rios, DO   metoprolol tartrate 25 mg Oral Q12H [address] Juan Antonio Rios, DO   multivitamin stress formula 1 tablet Oral Daily Juan Antonio Rios, DO   nicotine polacrilex 2 mg Oral Q2H PRN Leti Valdez PA-C   NON FORMULARY 35 mg Oral Weekly Rubi KESHAV Fraire   polyethylene glycol-propylene glycol 2 drop Both Eyes 4x Daily PRN Rubi CiminiKESHAV piedra   senna-docusate sodium 1 tablet Oral BID Rubi KESHAV Fraire   temazepam 15 mg Oral HS Juan Antonio Rios, DO   traZODone 50 mg Oral HS PRN Leti Valdez PA-C   triamcinolone  Topical BID Brian Aguilar MD        Today, Patient Was Seen By: KESHAV Roca

## 2018-07-23 NOTE — ASSESSMENT & PLAN NOTE
Stable on metoprolol and norvasc  HCTZ was d/c'd for hyponatremia   Sodium level improving, and will repeat labs in AM

## 2018-07-23 NOTE — PROGRESS NOTES
Awake, alert, oriented  Demanding, intrusive, poor insight, obsessive  Pt entered nurses' station while RN report occurring  Pt asked multiple times to please leave nurses' station so report could continue  Pt refusing to leave nurses' station for several minutes  Pt eventually redirected when door to med room closed  Will continue to monitor and encourage compliance, enforcing unit policies/rules as needed

## 2018-07-23 NOTE — NURSING NOTE
Patient compliant with medication now at 22:45  Is calmn on approach  Patient has her ritual to be able to take her medication  Patient has OCD  Patient is  preoccupied with it  Has pressured speech  Denies pain  No further complaints  Will continue to monitor
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
## 2018-07-23 NOTE — ASSESSMENT & PLAN NOTE
Pt reports chills  No fever  States she always has body aches  No weakness, congestions, sore throat, shortness of breath, N/V/D  Will repeat labs in the AM and continue to monitor

## 2018-07-23 NOTE — PROGRESS NOTES
Pt yelling from her bathroom "Shit spurts out of my ass and I have no toilet paper! And I have diarrhea every god damn day and no one cares!" Pt had a soft large BM, PRN Lomotil 2 5 mg PO given per pts request at 0155  Pt went back to bed   Will continue to monitor

## 2018-07-23 NOTE — OCCUPATIONAL THERAPY NOTE
Occupational Therapy Group Treatment Note      Adam Ramachandran    7/23/2018    Patient Active Problem List   Diagnosis    [facility] ([facility]    Essential hypertension    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HCC)    Hypothyroidism    B[facility] depression ([facility]    Hyponatremia    Chronic leg pain    Seasonal allergies    Bilateral dry eyes    Age-related osteoporosis without current pathological fracture    Chills (without fever)       Past Medical History:   Diagnosis Date    [facility] depression ([facility]     Essential hypertension     Hypothyroidism     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[facility] )        Past Surgical History:   Procedure Laterality Date    TONSILLECTOMY      TOTAL HIP ARTHROPLASTY Right         07/23/18 1010   Assessment   Assessment Ana Laura Richards was encouraged to attend this program  She was friendly on approach, but she did appear to have other things she needed to attend to  She did join the group for the second half  She sat at the group table  She was alert, attentive to discussion on healthy living  Sometimes she did have side conversations with other group members, but overall she was supportive of group process  She did take her turn she did discuss concepts relevant to healthy living  She talked about healthy choices she has made in her own life  Progress has been slow but consistent towards her goal Her efforts were commended  Plan   Treatment Interventions ADL retraining;Continued evaluation; Activityengagement  (coping skills instruction, life management instruction)   Goal Expiration Date 08/12/18   Treatment Day 11   OT Frequency 5x/wk   Ashley Blevins OT

## 2018-07-23 NOTE — ASSESSMENT & PLAN NOTE
Management per psych  Continues to have multiple somatic complaints with attention seeking behavior
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
## 2018-07-23 NOTE — ASSESSMENT & PLAN NOTE
Patient reports chronic leg and foot pain  She has prn tylenol and triamcinolone cream that was previously prescribed outpatient for rash  She has MARTHA stocking on at this time with no complaints

## 2018-07-23 NOTE — OCCUPATIONAL THERAPY NOTE
Occupational Therapy Group Treatment Note      Efrain España    7/23/2018    Patient Active Problem List   Diagnosis    Bipolar 1 disorder ([facility] )    Essential hypertension    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HCC)    Hypothyroidism    Bipolar depression ([facility] )    Hyponatremia    Chronic leg pain    Seasonal allergies    Bilateral dry eyes    Age-related osteoporosis without current pathological fracture    Chills (without fever)       Past Medical History:   Diagnosis Date    Bipolar depression ([facility] )     Essential hypertension     Hypothyroidism     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[facility] )        Past Surgical History:   Procedure Laterality Date    TONSILLECTOMY      TOTAL HIP ARTHROPLASTY Right         07/23/18 1110   Assessment   Assessment Sloan Roland was present for the first half of this socialization program  She sat at a group table  She was pleasant, friendly, but she spent most of her time in the group room filling out her menu for the next day  She did not carry out any morning stretch exercises  Her affect was friendly and positive during her interactions  She discussed living on the [facility] and her experiences  She was supportive of group process when in the group room, but she did leave the group early  Progress has been consistent towards her goal during group involvement  Continue to promote positive give and take interactions with others  Plan   Treatment Interventions ADL retraining;Continued evaluation; Activityengagement  (coping skills training, life management training)   Goal Expiration Date 08/12/18   Treatment Day 11   OT Frequency 5x/wk   Giuliana Daniel OT

## 2018-07-23 NOTE — ASSESSMENT & PLAN NOTE
Takes fosamax every Saturday, however, originally told we were unable to administer per hospital policy  She is on calcium and vitamin D  Pt insisted on taking her fosamax, and approval was obtained to enter non-formulary order for pt to take her own medication  Pt aware that she must sit upright and take with 8oz of water  She is compliant with her medication, and very particular about following instructions

## 2018-07-23 NOTE — PROGRESS NOTES
Pt resting comfortably in bed at this time, no distress noted  Safety precautions maintained, Q15 min checks, will continue to monitor

## 2018-07-23 NOTE — PROGRESS NOTES
Pt remains attention-seeking, demanding, intrusive, argumentative  Pt yelling in hallway, calling RN "bitch" secondary to being told she cannot sit in hallway for safety reasons  Will continue to monitor and encourage compliance

## 2018-07-23 NOTE — NURSING NOTE
Medication for this patient are given late due to patient behaviors  She refuses to get her medication when offered and said she will say the time she will be ready for it   Will continue to monitor

## 2018-07-23 NOTE — PROGRESS NOTES
Awake, alert, oriented, poor insight  Somatic, attention-seeking, intrusive, demanding, argumentative  Medication compliant at this time  Will continue to monitor and encourage compliance

## 2018-07-23 NOTE — PROGRESS NOTES
Came out of her room asking for more toilet paper for the bathroom, tearful, irritable, complaining no one put her stocking on her in the morning  Pt was reassured, redirected and assisted back to bed   Will continue to monitor

## 2018-07-23 NOTE — PROGRESS NOTES
Psychiatry Progress Note    Subjective: Interval History     The patient continues to be intrusive and irritable at times  She was yelling and cursing at a nurse this morning  She is hyperverbal   She has a flight of ideas  She is needy and attention seeking  Patient is tolerating her medications well  Will increase Thorazine today  Patient agrees to take this increased dose  She is medication and meal compliant and tolerating her meds well without side effect  She denies suicidal ideation      Current medications:    Current Facility-Administered Medications:     acetaminophen (TYLENOL) tablet 650 mg, 650 mg, Oral, Q4H PRN, Lili Del Valle PA-C    amLODIPine (NORVASC) tablet 5 mg, 5 mg, Oral, Daily, Juan Antonio Rios DO, 5 mg at 07/23/18 1528    benztropine (COGENTIN) tablet 1 mg, 1 mg, Oral, Q8H PRN, Lili Del Valle PA-C    calcium carbonate-vitamin D (OSCAL-D) 500 mg-200 units per tablet 1 tablet, 1 tablet, Oral, BID With Meals, Radhika Cueto MD, 1 tablet at 07/23/18 0491    chlorproMAZINE (THORAZINE) tablet 75 mg, 75 mg, Oral, BID, Misty Buck PA-C    cholecalciferol (VITAMIN D3) tablet 1,000 Units, 1,000 Units, Oral, Daily, Ayden Taylor DO, 1,000 Units at 07/23/18 0929    diphenoxylate-atropine (LOMOTIL) 2 5-0 025 mg per tablet 1 tablet, 1 tablet, Oral, 4x Daily PRN, Umair Aguilar PA-C, 1 tablet at 07/23/18 0155    divalproex sodium (DEPAKOTE) EC tablet 250 mg, 250 mg, Oral, Daily, Lili Del Valle PA-C, 250 mg at 07/23/18 9882    divalproex sodium (DEPAKOTE) EC tablet 500 mg, 500 mg, Oral, HS, Juan Antonio Rios DO, 500 mg at 07/22/18 2234    fexofenadine (ALLEGRA) tablet 180 mg, 180 mg, Oral, Daily, Radhika Cueto MD, 180 mg at 17/29/77 8812    folic acid (FOLVITE) tablet 1 mg, 1 mg, Oral, Daily, Juan Antonio Rios DO, 1 mg at 07/23/18 2296    levothyroxine tablet 25 mcg, 25 mcg, Oral, Every Other Day, Juan Antonio Rios DO, 25 mcg at 07/23/18 0603    levothyroxine tablet 50 mcg, 50 mcg, Oral, Every Other Day, Mallory Rodriguez DO, 50 mcg at 07/22/18 0547    LORazepam (ATIVAN) tablet 0 5 mg, 0 5 mg, Oral, Q6H PRN, Gini Vance PA-C    magnesium hydroxide (MILK OF MAGNESIA) 400 mg/5 mL oral suspension 30 mL, 30 mL, Oral, Daily PRN, Alejandra Lu PA-C    melatonin tablet 3 mg, 3 mg, Oral, HS, Juan Antonio Rios DO, 3 mg at 07/22/18 2234    metoprolol tartrate (LOPRESSOR) tablet 25 mg, 25 mg, Oral, Q12H [facility], Juan Antonio Rios, , 25 mg at 07/23/18 2313    multivitamin stress formula tablet 1 tablet, 1 tablet, Oral, Daily, Mallory Rodriguez DO, 1 tablet at 07/23/18 1503    nicotine polacrilex (NICORETTE) gum 2 mg, 2 mg, Oral, Q2H PRN, Alejandra Lu PA-C    NON FORMULARY, 35 mg, Oral, Weekly, KESHAV Amaral, Stopped at 07/21/18 0810    polyethylene glycol-propylene glycol (SYSTANE) 0 4-0 3 % ophthalmic solution 2 drop, 2 drop, Both Eyes, 4x Daily PRN, KESHAV Jeter, 2 drop at 07/23/18 0747    senna-docusate sodium (SENOKOT S) 8 6-50 mg per tablet 1 tablet, 1 tablet, Oral, BID, KESHAV Jeter, 1 tablet at 07/21/18 1713    temazepam (RESTORIL) capsule 15 mg, 15 mg, Oral, HS, Juan Antonio Rios, DO, 15 mg at 07/22/18 2234    traZODone (DESYREL) tablet 50 mg, 50 mg, Oral, HS PRN, Alejandra Lu PA-C    triamcinolone (KENALOG) 0 025 % cream, , Topical, BID, Luiz Wilson MD      Objective:     Vital Signs:  Vitals:    07/22/18 1624 07/22/18 2234 07/23/18 0500 07/23/18 0728   BP: 120/72 117/51  136/64   BP Location: Left arm   Right arm   Pulse: 84 78  65   Resp: 18   18   Temp: (!) 97 1 °F (36 2 °C)   (!) 97 4 °F (36 3 °C)   TempSrc: Temporal   Temporal   SpO2: 98%   99%   Weight:   57 1 kg (125 lb 14 1 oz)    Height:             Appearance:  age appropriate and casually dressed   Behavior:  normal   Speech:  normal volume   Mood:  labile   Affect:  labile   Thought Process:  flight of ideas and loose associations   Thought Content:  normal   Perceptual Disturbances: None Risk Potential: none   Sensorium:  person, place, situation and time   Cognition:  intact   Consciousness:  alert and awake    Attention: attention span and concentration were age appropriate   Intellect: average   Insight:  fair   Judgment: fair      Motor Activity: no abnormal movements           Recent Labs:  Results Reviewed     None          I/O Past 24 hours:  I/O last 3 completed shifts: In: 0 [P O :1380]  Out: -   I/O this shift:  In: 65 [P O :660]  Out: -         Assessment / Plan:     B[facility] 1 disorder ([facility] )    Recommended Treatment:      Medication changes:  1) Increase thorazine to 75mg BID  Non-pharmacological treatments  1) Continue with group therapy, milieu therapy and occupational therapy  Safety  1) Safety/communication plan established targeting dynamic risk factors above  2) Risks, benefits, and possible side effects of medications explained to patient and patient verbalizes understanding  Counseling / Coordination of Care    Total floor / unit time spent today 20 minutes  Greater than 50% of total time was spent with the patient and / or family counseling and / or coordination of care  A description of the counseling / coordination of care  Patient's Rights, confidentiality and exceptions to confidentiality, use of automated medical record, [address] [place] [address] staff access to medical record, and consent to treatment reviewed      Ildefonso Ferrell PA-C

## 2018-07-24 RX ORDER — AMOXICILLIN 250 MG
1 CAPSULE ORAL 2 TIMES DAILY PRN
Status: DISCONTINUED | OUTPATIENT
Start: 2018-07-24 | End: 2018-07-26 | Stop reason: HOSPADM

## 2018-07-24 RX ADMIN — TRIAMCINOLONE ACETONIDE: 0.25 CREAM TOPICAL at 09:23

## 2018-07-24 RX ADMIN — OYSTER SHELL CALCIUM WITH VITAMIN D 1 TABLET: 500; 200 TABLET, FILM COATED ORAL at 09:23

## 2018-07-24 RX ADMIN — LEVOTHYROXINE SODIUM 50 MCG: 25 TABLET ORAL at 05:02

## 2018-07-24 RX ADMIN — B-COMPLEX W/ C & FOLIC ACID TAB 1 TABLET: TAB at 09:22

## 2018-07-24 RX ADMIN — DIPHENOXYLATE HYDROCHLORIDE AND ATROPINE SULFATE 1 TABLET: 2.5; .025 TABLET ORAL at 22:20

## 2018-07-24 RX ADMIN — MELATONIN 3 MG: 3 TAB ORAL at 22:21

## 2018-07-24 RX ADMIN — DIPHENOXYLATE HYDROCHLORIDE AND ATROPINE SULFATE 1 TABLET: 2.5; .025 TABLET ORAL at 17:59

## 2018-07-24 RX ADMIN — DIPHENOXYLATE HYDROCHLORIDE AND ATROPINE SULFATE 1 TABLET: 2.5; .025 TABLET ORAL at 04:53

## 2018-07-24 RX ADMIN — FOLIC ACID 1 MG: 1 TABLET ORAL at 09:22

## 2018-07-24 RX ADMIN — DIVALPROEX SODIUM 500 MG: 500 TABLET, DELAYED RELEASE ORAL at 22:21

## 2018-07-24 RX ADMIN — FEXOFENADINE HCL 180 MG: 180 TABLET ORAL at 12:00

## 2018-07-24 RX ADMIN — DIVALPROEX SODIUM 250 MG: 250 TABLET, DELAYED RELEASE ORAL at 09:22

## 2018-07-24 RX ADMIN — TEMAZEPAM 15 MG: 15 CAPSULE ORAL at 22:20

## 2018-07-24 RX ADMIN — VITAMIN D, TAB 1000IU (100/BT) 1000 UNITS: 25 TAB at 09:22

## 2018-07-24 RX ADMIN — CHLORPROMAZINE HYDROCHLORIDE 75 MG: 25 TABLET, SUGAR COATED ORAL at 09:22

## 2018-07-24 RX ADMIN — CHLORPROMAZINE HYDROCHLORIDE 75 MG: 25 TABLET, SUGAR COATED ORAL at 17:59

## 2018-07-24 RX ADMIN — OYSTER SHELL CALCIUM WITH VITAMIN D 1 TABLET: 500; 200 TABLET, FILM COATED ORAL at 16:51

## 2018-07-24 NOTE — PROGRESS NOTES
Psychiatry Progress Note    Subjective: Interval History     The patient continues to be hyperverbal and somatic  She is needy and intrusive with staff  She refused her morning labs today  She has poor insight  She has outbursts of laughing  Her focus is poor  Thorazine was increased yesterday  Patient is medication and meal compliant  She is tolerating her medications well  Patient is visible on the unit      Current medications:    Current Facility-Administered Medications:     acetaminophen (TYLENOL) tablet 650 mg, 650 mg, Oral, Q4H PRN, Yocasta Dominique PA-C    amLODIPine (NORVASC) tablet 5 mg, 5 mg, Oral, Daily, Juan Antonio Rios DO, 5 mg at 07/23/18 9802    benztropine (COGENTIN) tablet 1 mg, 1 mg, Oral, Q8H PRN, Yocasta Dominique PA-C    calcium carbonate-vitamin D (OSCAL-D) 500 mg-200 units per tablet 1 tablet, 1 tablet, Oral, BID With Meals, Keith García MD, 1 tablet at 07/23/18 1805    chlorproMAZINE (THORAZINE) tablet 75 mg, 75 mg, Oral, BID, Misty Buck PA-C, 75 mg at 07/23/18 1805    cholecalciferol (VITAMIN D3) tablet 1,000 Units, 1,000 Units, Oral, Daily, Juan Antonio Rios DO, 1,000 Units at 07/23/18 0929    diphenoxylate-atropine (LOMOTIL) 2 5-0 025 mg per tablet 1 tablet, 1 tablet, Oral, 4x Daily PRN, Ansley Clayton PA-C, 1 tablet at 07/24/18 0453    divalproex sodium (DEPAKOTE) EC tablet 250 mg, 250 mg, Oral, Daily, Yocasta Dominique PA-C, 250 mg at 07/23/18 6090    divalproex sodium (DEPAKOTE) EC tablet 500 mg, 500 mg, Oral, HS, Juan Antonio Rios DO, 500 mg at 07/23/18 2313    fexofenadine (ALLEGRA) tablet 180 mg, 180 mg, Oral, Daily, Keith García MD, 180 mg at 29/38/01 1028    folic acid (FOLVITE) tablet 1 mg, 1 mg, Oral, Daily, Juan Antonio Rios DO, 1 mg at 07/23/18 4439    levothyroxine tablet 25 mcg, 25 mcg, Oral, Every Other Day, Juan Antonio Rios DO, 25 mcg at 07/23/18 0603    levothyroxine tablet 50 mcg, 50 mcg, Oral, Every Other Day, Juan Antonio Rios DO, 50 mcg at 07/24/18 0502    LORazepam (ATIVAN) tablet 0 5 mg, 0 5 mg, Oral, Q6H PRN, Lynnetta Sacks, PA-C    magnesium hydroxide (MILK OF MAGNESIA) 400 mg/5 mL oral suspension 30 mL, 30 mL, Oral, Daily PRN, Danielle Miguel PA-C    melatonin tablet 3 mg, 3 mg, Oral, HS, Juan Antonio Gabriel, DO, 3 mg at 07/23/18 2312    metoprolol tartrate (LOPRESSOR) tablet 25 mg, 25 mg, Oral, Q12H [address], Juan Antonio Gabriel, DO, 25 mg at 07/23/18 2313    multivitamin stress formula tablet 1 tablet, 1 tablet, Oral, Daily, Nuria Pillow, DO, 1 tablet at 07/23/18 9287    nicotine polacrilex (NICORETTE) gum 2 mg, 2 mg, Oral, Q2H PRN, TIMO Benitez-C    NON FORMULARY, 35 mg, Oral, Weekly, Isabel Yves, KESHAV, Stopped at 07/21/18 0810    polyethylene glycol-propylene glycol (SYSTANE) 0 4-0 3 % ophthalmic solution 2 drop, 2 drop, Both Eyes, 4x Daily PRN, Rubi MAURA FraireNP, 2 drop at 07/23/18 2212    senna-docusate sodium (SENOKOT S) 8 6-50 mg per tablet 1 tablet, 1 tablet, Oral, BID PRN, Silvia Ramses Ciminieri, CRNP    temazepam (RESTORIL) capsule 15 mg, 15 mg, Oral, HS, Juan Antonio Rios, DO, 15 mg at 07/23/18 2311    traZODone (DESYREL) tablet 50 mg, 50 mg, Oral, HS PRN, TIMO Benitez-C    triamcinolone (KENALOG) 0 025 % cream, , Topical, BID, Linnea Bean MD      Objective:     Vital Signs:  Vitals:    07/23/18 0728 07/23/18 2311 07/24/18 0500 07/24/18 0714   BP: 136/64 111/56  (!) 111/44   BP Location: Right arm   Left arm   Pulse: 65 76  63   Resp: 18   16   Temp: (!) 97 4 °F (36 3 °C)   (!) 97 3 °F (36 3 °C)   TempSrc: Temporal   Temporal   SpO2: 99%   98%   Weight:   56 8 kg (125 lb 4 8 oz)    Height:             Appearance:  age appropriate and casually dressed   Behavior:  normal   Speech:  normal volume   Mood:  labile   Affect:  labile   Thought Process:  flight of ideas and loose associations   Thought Content:  normal   Perceptual Disturbances: None   Risk Potential: none   Sensorium:  person, place, situation and time Cognition:  intact   Consciousness:  alert and awake    Attention: attention span and concentration were age appropriate   Intellect: average   Insight:  fair   Judgment: fair      Motor Activity: no abnormal movements           Recent Labs:  Results Reviewed     None          I/O Past 24 hours:  I/O last 3 completed shifts: In: 1380 [P O :1380]  Out: -   I/O this shift: In: 18 [P O :480]  Out: -         Assessment / Plan:     Bipolar 1 disorder ([facility] )    Recommended Treatment:      Medication changes:  1) Continue current medication regimen  Thorazine increased to 75mg BID yesterday  Non-pharmacological treatments  1) Continue with group therapy, milieu therapy and occupational therapy  Safety  1) Safety/communication plan established targeting dynamic risk factors above  2) Risks, benefits, and possible side effects of medications explained to patient and patient verbalizes understanding  Counseling / Coordination of Care    Total floor / unit time spent today 20 minutes  Greater than 50% of total time was spent with the patient and / or family counseling and / or coordination of care  A description of the counseling / coordination of care  Patient's Rights, confidentiality and exceptions to confidentiality, use of automated medical record, [facility] staff access to medical record, and consent to treatment reviewed      Nathan Keating PA-C

## 2018-07-24 NOTE — NURSING NOTE
Patient was compliant with bedtime medication at 23:13  Patient has a ritual she follows before taking her medication  Patient is now in bed  No further comments   Will continue to monitor

## 2018-07-24 NOTE — PLAN OF CARE
ANXIETY     Will report anxiety at manageable levels Not Progressing        BEHAVIOR     Pt/Family maintain appropriate behavior and adhere to behavioral management agreement, if implemented Not Progressing          SAFETY ADULT     Patient will remain free of falls Progressing

## 2018-07-24 NOTE — PROGRESS NOTES
Pt irritable, somatic and entitled at times  /44, pt refused metoprolol and norvasc at 0900  Good appetite  Using rw with steady gait  Pt obsessive after having two small, soft bm's stating she wanted to be tested for c-diff  Pt refused am labs  Monitored in supervised dayroom for safety and support

## 2018-07-24 NOTE — PROGRESS NOTES
Patient is anxious and fixated on her bowel movements  Patient has had several small bm on this shift  Imodium was given at patient's request  Patient has been verbally aggressive towards staff  She yells for staff for her needs  I asked the patient to ask for what she needs and not yell for staff unless its an emergency  And  to give us time to get the items  Patient is demanding  Patient has been compliant with meals and medications  Will continue to monitor behavior and safety

## 2018-07-24 NOTE — PROGRESS NOTES
Pt c/o loose stool  PRN Lomotil administered as requested and ordered  Scheduled Senna-S changed to PRN  Pt remains, attention-seeking, somatic, demanding, intrusive  Will continue to monitor and encourage compliance

## 2018-07-25 PROCEDURE — 97150 GROUP THERAPEUTIC PROCEDURES: CPT

## 2018-07-25 RX ADMIN — DIPHENOXYLATE HYDROCHLORIDE AND ATROPINE SULFATE 1 TABLET: 2.5; .025 TABLET ORAL at 18:46

## 2018-07-25 RX ADMIN — FOLIC ACID 1 MG: 1 TABLET ORAL at 08:03

## 2018-07-25 RX ADMIN — OYSTER SHELL CALCIUM WITH VITAMIN D 1 TABLET: 500; 200 TABLET, FILM COATED ORAL at 18:02

## 2018-07-25 RX ADMIN — AMLODIPINE BESYLATE 5 MG: 5 TABLET ORAL at 08:03

## 2018-07-25 RX ADMIN — TRIAMCINOLONE ACETONIDE: 0.25 CREAM TOPICAL at 09:56

## 2018-07-25 RX ADMIN — FEXOFENADINE HCL 180 MG: 180 TABLET ORAL at 12:40

## 2018-07-25 RX ADMIN — METOPROLOL TARTRATE 25 MG: 25 TABLET, FILM COATED ORAL at 22:04

## 2018-07-25 RX ADMIN — B-COMPLEX W/ C & FOLIC ACID TAB 1 TABLET: TAB at 08:03

## 2018-07-25 RX ADMIN — MELATONIN 3 MG: 3 TAB ORAL at 22:05

## 2018-07-25 RX ADMIN — DIVALPROEX SODIUM 250 MG: 250 TABLET, DELAYED RELEASE ORAL at 08:03

## 2018-07-25 RX ADMIN — VITAMIN D, TAB 1000IU (100/BT) 1000 UNITS: 25 TAB at 08:03

## 2018-07-25 RX ADMIN — TEMAZEPAM 15 MG: 15 CAPSULE ORAL at 22:05

## 2018-07-25 RX ADMIN — CHLORPROMAZINE HYDROCHLORIDE 75 MG: 25 TABLET, SUGAR COATED ORAL at 18:01

## 2018-07-25 RX ADMIN — DIVALPROEX SODIUM 500 MG: 500 TABLET, DELAYED RELEASE ORAL at 22:05

## 2018-07-25 RX ADMIN — METOPROLOL TARTRATE 25 MG: 25 TABLET, FILM COATED ORAL at 08:03

## 2018-07-25 RX ADMIN — CHLORPROMAZINE HYDROCHLORIDE 75 MG: 25 TABLET, SUGAR COATED ORAL at 08:02

## 2018-07-25 RX ADMIN — OYSTER SHELL CALCIUM WITH VITAMIN D 1 TABLET: 500; 200 TABLET, FILM COATED ORAL at 08:03

## 2018-07-25 RX ADMIN — LEVOTHYROXINE SODIUM 25 MCG: 25 TABLET ORAL at 06:10

## 2018-07-25 NOTE — OCCUPATIONAL THERAPY NOTE
Occupational Therapy Group Treatment Note      Pedro Gamblea    7/25/2018    Patient Active Problem List   Diagnosis    [facility] ([facility] )    Essential hypertension    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HCC)    Hypothyroidism    Bipolar depression ([facility] )    Hyponatremia    Chronic leg pain    Seasonal allergies    Bilateral dry eyes    Age-related osteoporosis without current pathological fracture    Chills (without fever)       Past Medical History:   Diagnosis Date    Bipolar depression ([facility] )     Essential hypertension     Hypothyroidism     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[facility] )        Past Surgical History:   Procedure Laterality Date    TONSILLECTOMY      TOTAL HIP ARTHROPLASTY Right         07/25/18 1125   Assessment   Assessment Bard Khan joined the last 25-30 minutes of this program  She was initially alert, attentive  However, she appeared to become tired and fatigued early in the session  She was passive for most of her time in this program, she did not engage actively due to fatigue  No progress was noted at this time due to passive involvement on this occasion  Continue to promote calm give and take interactions in OT group program   Plan   Treatment Interventions ADL retraining;Continued evaluation; Activityengagement  (coping skills instruction, life management instruction)   Goal Expiration Date 08/12/18   Treatment Day 13   OT Frequency 5x/wk   He Preciado, OT
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
## 2018-07-25 NOTE — SOCIAL WORK
Pt salvador Dylan Thomas phone #[phone]  Pt  Enmanuel Harry phone # [phone]  SW will continue to follow up

## 2018-07-25 NOTE — PROGRESS NOTES
Psychiatry Progress Note    Subjective: Interval History     The patient continues to be hyperverbal   She is more redirectable  She did refuse her labs and blood pressure medications this morning  She continues to have a flight of ideas during conversation however appears slightly more focused  She is medication and meal compliant  She is tolerating the medication well without side effect  She slept well last night  Will continue current medication regimen with recent increase in Thorazine and monitor      Current medications:    Current Facility-Administered Medications:     acetaminophen (TYLENOL) tablet 650 mg, 650 mg, Oral, Q4H PRN, Leti Valdez PA-C    amLODIPine (NORVASC) tablet 5 mg, 5 mg, Oral, Daily, Juan Antonio Rios DO, 5 mg at 07/25/18 0803    benztropine (COGENTIN) tablet 1 mg, 1 mg, Oral, Q8H PRN, Leti Valdez PA-C    calcium carbonate-vitamin D (OSCAL-D) 500 mg-200 units per tablet 1 tablet, 1 tablet, Oral, BID With Meals, Brian Aguilar MD, 1 tablet at 07/25/18 0803    chlorproMAZINE (THORAZINE) tablet 75 mg, 75 mg, Oral, BID, Misty Buck PA-C, 75 mg at 07/25/18 0802    cholecalciferol (VITAMIN D3) tablet 1,000 Units, 1,000 Units, Oral, Daily, Amber Robertson DO, 1,000 Units at 07/25/18 0803    diphenoxylate-atropine (LOMOTIL) 2 5-0 025 mg per tablet 1 tablet, 1 tablet, Oral, 4x Daily PRN, Ying Kay PA-C, 1 tablet at 07/24/18 2220    divalproex sodium (DEPAKOTE) EC tablet 250 mg, 250 mg, Oral, Daily, Leti Valdez PA-C, 250 mg at 07/25/18 0803    divalproex sodium (DEPAKOTE) EC tablet 500 mg, 500 mg, Oral, HS, Juan Antonio Rios DO, 500 mg at 07/24/18 2221    fexofenadine (ALLEGRA) tablet 180 mg, 180 mg, Oral, Daily, Brian Aguilar MD, 180 mg at 34/34/39 1477    folic acid (FOLVITE) tablet 1 mg, 1 mg, Oral, Daily, Juan Antonio Rios DO, 1 mg at 07/25/18 0803    levothyroxine tablet 25 mcg, 25 mcg, Oral, Every Other Day, Juan Antonio Rios DO, 25 mcg at 07/25/18 0610   levothyroxine tablet 50 mcg, 50 mcg, Oral, Every Other Day, Juan Antonio Gabriel, DO, 50 mcg at 07/24/18 0502    LORazepam (ATIVAN) tablet 0 5 mg, 0 5 mg, Oral, Q6H PRN, Juni Bundy PA-C    magnesium hydroxide (MILK OF MAGNESIA) 400 mg/5 mL oral suspension 30 mL, 30 mL, Oral, Daily PRN, Larry Akers PA-C    melatonin tablet 3 mg, 3 mg, Oral, HS, Juan Antonio Rios, DO, 3 mg at 07/24/18 2221    metoprolol tartrate (LOPRESSOR) tablet 25 mg, 25 mg, Oral, Q12H [address], Juan Antonio Burnsng, DO, 25 mg at 07/25/18 0803    multivitamin stress formula tablet 1 tablet, 1 tablet, Oral, Daily, Ermelinda Bile, DO, 1 tablet at 07/25/18 0803    nicotine polacrilex (NICORETTE) gum 2 mg, 2 mg, Oral, Q2H PRN, Larry Akers PA-C    NON FORMULARY, 35 mg, Oral, Weekly, KESHAV Starks, Stopped at 07/21/18 0810    polyethylene glycol-propylene glycol (SYSTANE) 0 4-0 3 % ophthalmic solution 2 drop, 2 drop, Both Eyes, 4x Daily PRN, KESHAV Jeter, 2 drop at 07/23/18 2212    senna-docusate sodium (SENOKOT S) 8 6-50 mg per tablet 1 tablet, 1 tablet, Oral, BID PRN, KESHAV Guardado    temazepam (RESTORIL) capsule 15 mg, 15 mg, Oral, HS, Juan Antonio Rios, DO, 15 mg at 07/24/18 2220    traZODone (DESYREL) tablet 50 mg, 50 mg, Oral, HS PRN, Larry Akers PA-C    triamcinolone (KENALOG) 0 025 % cream, , Topical, BID, Jamar Pereira MD      Objective:     Vital Signs:  Vitals:    07/24/18 1513 07/24/18 2100 07/24/18 2222 07/25/18 0633   BP:  122/54 121/54 161/69   BP Location:  Left arm  Right arm   Pulse:  88 87 79   Resp:    18   Temp:    (!) 97 °F (36 1 °C)   TempSrc:    Temporal   SpO2:    93%   Weight: 57 6 kg (127 lb)      Height:             Appearance:  age appropriate and casually dressed   Behavior:  normal   Speech:  normal volume   Mood:  labile   Affect:  labile   Thought Process:  flight of ideas and loose associations   Thought Content:  normal   Perceptual Disturbances: None   Risk Potential: none Sensorium:  person, place, situation and time   Cognition:  intact   Consciousness:  alert and awake    Attention: attention span and concentration were age appropriate   Intellect: average   Insight:  fair   Judgment: fair      Motor Activity: no abnormal movements           Recent Labs:  Results Reviewed     None          I/O Past 24 hours:  I/O last 3 completed shifts: In: 1200 [P O :1200]  Out: -   No intake/output data recorded  Assessment / Plan:     [facility] ([facility] )    Recommended Treatment:      Medication changes:  1) Continue current medication regimen  Non-pharmacological treatments  1) Continue with group therapy, milieu therapy and occupational therapy  Safety  1) Safety/communication plan established targeting dynamic risk factors above  2) Risks, benefits, and possible side effects of medications explained to patient and patient verbalizes understanding  Counseling / Coordination of Care    Total floor / unit time spent today 20 minutes  Greater than 50% of total time was spent with the patient and / or family counseling and / or coordination of care  A description of the counseling / coordination of care  Patient's Rights, confidentiality and exceptions to confidentiality, use of automated medical record, [facility] to [facility] record, and consent to treatment reviewed      Chepe Guillermo PA-C

## 2018-07-25 NOTE — PROGRESS NOTES
Pt in bed and appears to be asleep, no behavioral issues or acts of self-harm observed at this time  15 min checks maintained for safety and support

## 2018-07-25 NOTE — PROGRESS NOTES
Pt with good appetite at lunch  /52 at 1000, D  [address] notified of labile BP's  Med-compliant  Gait steady with rw  Less irritable with more even affect  Somatic at times  Monitored for safety and support

## 2018-07-25 NOTE — NURSING NOTE
Patient complaining of diarrhea  Had 2 BM on this shift   (first BM soft stool, the second BM was loose)  However,  according to patient "it is not normal for her and she asked to be given a PRN  PRN given at 18:46 for diarrhea  NO further complaints  Will continue to monitor
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
## 2018-07-26 VITALS
BODY MASS INDEX: 21.68 KG/M2 | WEIGHT: 127 LBS | HEART RATE: 73 BPM | SYSTOLIC BLOOD PRESSURE: 128 MMHG | TEMPERATURE: 97.4 F | RESPIRATION RATE: 18 BRPM | DIASTOLIC BLOOD PRESSURE: 58 MMHG | OXYGEN SATURATION: 99 % | HEIGHT: 64 IN

## 2018-07-26 PROCEDURE — 97150 GROUP THERAPEUTIC PROCEDURES: CPT

## 2018-07-26 RX ORDER — LEVOTHYROXINE SODIUM 0.03 MG/1
25 TABLET ORAL EVERY OTHER DAY
Qty: 15 TABLET | Refills: 0 | Status: ON HOLD | OUTPATIENT
Start: 2018-07-27 | End: 2020-12-22 | Stop reason: SDUPTHER

## 2018-07-26 RX ORDER — AMLODIPINE BESYLATE 5 MG/1
5 TABLET ORAL DAILY
Qty: 30 TABLET | Refills: 0 | Status: SHIPPED | OUTPATIENT
Start: 2018-07-26 | End: 2020-01-07 | Stop reason: ALTCHOICE

## 2018-07-26 RX ORDER — LANOLIN ALCOHOL/MO/W.PET/CERES
3 CREAM (GRAM) TOPICAL
Qty: 30 TABLET | Refills: 0 | Status: SHIPPED | OUTPATIENT
Start: 2018-07-26 | End: 2020-12-22 | Stop reason: HOSPADM

## 2018-07-26 RX ORDER — ALENDRONATE SODIUM 35 MG/1
35 TABLET ORAL WEEKLY
Qty: 4 TABLET | Refills: 0 | Status: SHIPPED | OUTPATIENT
Start: 2018-07-28 | End: 2018-12-04 | Stop reason: SDUPTHER

## 2018-07-26 RX ORDER — LEVOTHYROXINE SODIUM 0.05 MG/1
50 TABLET ORAL EVERY OTHER DAY
Qty: 15 TABLET | Refills: 0 | Status: ON HOLD | OUTPATIENT
Start: 2018-07-28 | End: 2020-12-22 | Stop reason: SDUPTHER

## 2018-07-26 RX ORDER — DIVALPROEX SODIUM 500 MG/1
500 TABLET, DELAYED RELEASE ORAL
Qty: 30 TABLET | Refills: 0 | Status: SHIPPED | OUTPATIENT
Start: 2018-07-26 | End: 2020-01-07 | Stop reason: SDUPTHER

## 2018-07-26 RX ORDER — CHLORPROMAZINE HYDROCHLORIDE 25 MG/1
75 TABLET, FILM COATED ORAL 2 TIMES DAILY
Qty: 180 TABLET | Refills: 0 | Status: SHIPPED | OUTPATIENT
Start: 2018-07-26 | End: 2020-01-07 | Stop reason: ALTCHOICE

## 2018-07-26 RX ORDER — FOLIC ACID 1 MG/1
1 TABLET ORAL DAILY
Qty: 30 TABLET | Refills: 0 | Status: SHIPPED | OUTPATIENT
Start: 2018-07-26 | End: 2020-12-22 | Stop reason: HOSPADM

## 2018-07-26 RX ORDER — TEMAZEPAM 15 MG/1
15 CAPSULE ORAL
Qty: 30 CAPSULE | Refills: 0 | Status: SHIPPED | OUTPATIENT
Start: 2018-07-26 | End: 2018-12-04

## 2018-07-26 RX ORDER — FEXOFENADINE HCL 180 MG/1
180 TABLET ORAL DAILY
Qty: 30 TABLET | Refills: 0 | Status: ON HOLD | OUTPATIENT
Start: 2018-07-26 | End: 2020-12-22 | Stop reason: SDUPTHER

## 2018-07-26 RX ORDER — DIVALPROEX SODIUM 250 MG/1
250 TABLET, DELAYED RELEASE ORAL DAILY
Qty: 30 TABLET | Refills: 0 | Status: SHIPPED | OUTPATIENT
Start: 2018-07-26 | End: 2020-12-22 | Stop reason: HOSPADM

## 2018-07-26 RX ORDER — B-COMPLEX WITH VITAMIN C
1 TABLET ORAL 2 TIMES DAILY WITH MEALS
Qty: 60 TABLET | Refills: 0 | Status: SHIPPED | OUTPATIENT
Start: 2018-07-26 | End: 2020-01-07 | Stop reason: ALTCHOICE

## 2018-07-26 RX ORDER — TRIAMCINOLONE ACETONIDE 0.25 MG/G
CREAM TOPICAL 2 TIMES DAILY
Qty: 15 G | Refills: 0 | Status: SHIPPED | OUTPATIENT
Start: 2018-07-26 | End: 2020-01-07 | Stop reason: ALTCHOICE

## 2018-07-26 RX ADMIN — METOPROLOL TARTRATE 25 MG: 25 TABLET, FILM COATED ORAL at 09:23

## 2018-07-26 RX ADMIN — AMLODIPINE BESYLATE 5 MG: 5 TABLET ORAL at 09:24

## 2018-07-26 RX ADMIN — OYSTER SHELL CALCIUM WITH VITAMIN D 1 TABLET: 500; 200 TABLET, FILM COATED ORAL at 09:23

## 2018-07-26 RX ADMIN — DIPHENOXYLATE HYDROCHLORIDE AND ATROPINE SULFATE 1 TABLET: 2.5; .025 TABLET ORAL at 03:41

## 2018-07-26 RX ADMIN — B-COMPLEX W/ C & FOLIC ACID TAB 1 TABLET: TAB at 09:23

## 2018-07-26 RX ADMIN — TRIAMCINOLONE ACETONIDE: 0.25 CREAM TOPICAL at 09:24

## 2018-07-26 RX ADMIN — CHLORPROMAZINE HYDROCHLORIDE 75 MG: 25 TABLET, SUGAR COATED ORAL at 09:26

## 2018-07-26 RX ADMIN — DIVALPROEX SODIUM 250 MG: 250 TABLET, DELAYED RELEASE ORAL at 09:23

## 2018-07-26 RX ADMIN — VITAMIN D, TAB 1000IU (100/BT) 1000 UNITS: 25 TAB at 09:23

## 2018-07-26 RX ADMIN — FEXOFENADINE HCL 180 MG: 180 TABLET ORAL at 12:29

## 2018-07-26 RX ADMIN — LEVOTHYROXINE SODIUM 50 MCG: 25 TABLET ORAL at 06:02

## 2018-07-26 RX ADMIN — FOLIC ACID 1 MG: 1 TABLET ORAL at 09:24

## 2018-07-26 NOTE — OCCUPATIONAL THERAPY NOTE
Occupational Therapy Group Treatment Note      Matt Stevens    7/26/2018    Patient Active Problem List   Diagnosis    Bipolar 1 disorder ([facility] )    Essential hypertension    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HCC)    Hypothyroidism    Bipolar depression ([facility] )    Hyponatremia    Chronic leg pain    Seasonal allergies    Bilateral dry eyes    Age-related osteoporosis without current pathological fracture       Past Medical History:   Diagnosis Date    [facility] depression ([facility] )     Essential hypertension     Hypothyroidism     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[facility] )        Past Surgical History:   Procedure Laterality Date    TONSILLECTOMY      TOTAL HIP ARTHROPLASTY Right         07/26/18 1010   Assessment   Assessment Sloan Roland was present for the full session of Life Management session  She appeared to be in good spirits  She was finishing her breakfast, but she did participate in the conversation during [address] activity  She did explore with the group signs of not doing well, personal supports, safety, strategies for wellness, things that interest her  She sometimes commented in a pleasant manner  She was also saying goodbye to select persons given that she would be discharged on this occasion  Progress has been consistent towards her goal  Her contributions to this program were commended  Plan   Treatment Interventions ADL retraining;Continued evaluation; Activityengagement  (coping skills, life mangement strategies)   Goal Expiration Date 08/12/18   Treatment Day 14   OT Frequency 5x/wk   Chucky Severance, OT
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
## 2018-07-26 NOTE — PLAN OF CARE

## 2018-07-26 NOTE — NURSING NOTE
Pt and  left unit via wc with all her belongings and accompanied by staff at [phone] to home  Good appetite and steady gait with rw  VSS  Pt and  expressed understanding of d/c meds and f/u instructions  Monitored for safety and support
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
## 2018-07-26 NOTE — OCCUPATIONAL THERAPY NOTE
Occupational Therapy Group Treatment Note      Kaleigh Lab    7/26/2018    Patient Active Problem List   Diagnosis    Bipolar 1 disorder ([facility] )    Essential hypertension    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HCC)    Hypothyroidism    Bipolar depression ([facility] )    Hyponatremia    Chronic leg pain    Seasonal allergies    Bilateral dry eyes    Age-related osteoporosis without current pathological fracture       Past Medical History:   Diagnosis Date    Bipolar depression ([facility] )     Essential hypertension     Hypothyroidism     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[facility] )        Past Surgical History:   Procedure Laterality Date    TONSILLECTOMY      TOTAL HIP ARTHROPLASTY Right         07/26/18 1115   Assessment   Assessment Sloan Roland was present for at least 40 minutes of this program  She was present during the morning stretch exercises, she did carry out some of these, particularly the LE exercises  She did express concern that since she has been in the hospital, he PT progress has been set back 4 years (although she has been walking with rolling walker since she has been on this unit)  She was reminded that sometimes in the course of recovery there are times persons might experience more challenges  She was overall attentive to program discussions and current events review  She was at times tearful when saying her goodbyes to others  Progress has been consistent towards her goal  Her motivation for program investment was commended  Discontinue further [facility] assignment upon her discharge from this hospital    Plan   Treatment Interventions ADL retraining;Continued evaluation; Activityengagement  (coping skills training, life management training)   Goal Expiration Date 08/12/18   Treatment Day 14   OT Frequency 5x/wk   Jacqueline Sampson, OT

## 2018-07-26 NOTE — PROGRESS NOTES
Appears to be sleeping, eyes closed and respirations noted  Monitored on Q 15 minute safety checks  No behavior issues noted
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
## 2018-07-26 NOTE — NURSING NOTE
Patient is refusing labs to be done today  She has a BMP and CBC ordered at 07/24, and is refusing the blood draw  No further complaints   Will continue to monitor

## 2018-07-26 NOTE — PROGRESS NOTES
Currently observed in bed with eyes closed appears to be sleeping  Was awake earlier had soft BM and requested and given lomotil at 0341  Gait steady with RW   Refused labs yesterday per 3-11 nurse pt will decide at 10 am and choose who she wants to draw lab

## 2018-07-26 NOTE — SOCIAL WORK
Patient is being discharged, no SI/HI, no psychosis or A/V  Patient is in agreement with discharge  No questions verbalized  Patient provided with after care appointment, discharge instructions and prescriptions  IMM, core measures, transition of care, DC instructions, and treatment plan completed  Pt was picked up by her  at 1:30 for a DC home  SW will continue to follow up as necessary

## 2018-07-29 ENCOUNTER — HOSPITAL ENCOUNTER (EMERGENCY)
Facility: HOSPITAL | Age: 78
Discharge: HOME | End: 2018-07-29
Attending: EMERGENCY MEDICINE | Admitting: EMERGENCY MEDICINE
Payer: MEDICARE

## 2018-07-29 ENCOUNTER — APPOINTMENT (EMERGENCY)
Dept: RADIOLOGY | Facility: HOSPITAL | Age: 78
End: 2018-07-29
Payer: MEDICARE

## 2018-07-29 ENCOUNTER — APPOINTMENT (EMERGENCY)
Dept: CT IMAGING | Facility: HOSPITAL | Age: 78
End: 2018-07-29
Payer: MEDICARE

## 2018-07-29 VITALS
TEMPERATURE: 97.8 F | SYSTOLIC BLOOD PRESSURE: 92 MMHG | RESPIRATION RATE: 18 BRPM | HEART RATE: 63 BPM | DIASTOLIC BLOOD PRESSURE: 43 MMHG | OXYGEN SATURATION: 96 %

## 2018-07-29 DIAGNOSIS — T14.8XXA CONTUSION: ICD-10-CM
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
## 2018-07-29 DIAGNOSIS — S09.90XA TRAUMATIC INJURY OF HEAD, INITIAL ENCOUNTER: ICD-10-CM

## 2018-07-29 DIAGNOSIS — M25.559 HIP PAIN: ICD-10-CM

## 2018-07-29 PROCEDURE — 99284 EMERGENCY DEPT VISIT MOD MDM: CPT

## 2018-07-29 PROCEDURE — 73502 X-RAY EXAM HIP UNI 2-3 VIEWS: CPT

## 2018-07-29 PROCEDURE — 70450 CT HEAD/BRAIN W/O DYE: CPT

## 2018-07-29 PROCEDURE — 73030 X-RAY EXAM OF SHOULDER: CPT

## 2018-07-29 PROCEDURE — 72125 CT NECK SPINE W/O DYE: CPT

## 2018-07-29 RX ORDER — ACETAMINOPHEN 325 MG/1
650 TABLET ORAL ONCE
Status: COMPLETED | OUTPATIENT
Start: 2018-07-29 | End: 2018-07-29

## 2018-07-29 RX ORDER — ACETAMINOPHEN 325 MG/1
TABLET ORAL
Status: COMPLETED
Start: 2018-07-29 | End: 2018-07-29

## 2018-07-29 RX ADMIN — ACETAMINOPHEN 650 MG: 325 TABLET ORAL at 14:51

## 2018-07-29 NOTE — ED PROVIDER NOTES
History  Chief Complaint   Patient presents with    Fall     "I was stooping down to find somewhere to put [place] gifts away and I fell backward and hit my head on the dresser and now I'm sleepy and my right hip hurts"  Denies LOC  Happened at noon     A 69-year-old female presents status post fall at home  She reports that she was leaning over to  some [place] decorations and stood up too fast causing her to lose her balance and fall backwards  She reports that she struck her head on her very hard round-edged dresser  She denies loss of consciousness  She complains of slight pain in the back of her head along with pain in her right hip (reports having a hip replacement on that side at approximately 1 and half years ago)  Patient states that she was able to get up on her own because her  was of no use to her at that point and she was able to ambulate  She denies headache or dizziness  Denies any injury to her back or chest         Fall   Mechanism of injury: fall    Injury location:  Head/neck and pelvis  Incident location:  Home  Fall:     Fall occurred:  Standing    Entrapped after fall: no    Prior to arrival data:     Bystander interventions:  None    Patient ambulatory at scene: yes      Blood loss:  None    Responsiveness at scene:  Alert    Orientation at scene:  Person, place, situation and time    Loss of consciousness: no      Amnesic to event: no      Immobilization:  None  Associated symptoms: no abdominal pain, no back pain, no chest pain, no headaches, no hearing loss, no nausea, no neck pain, no seizures and no vomiting        Prior to Admission Medications   Prescriptions Last Dose Informant Patient Reported?  Taking?   acetaminophen (TYLENOL) 500 mg tablet   Yes No   Sig: Take 500 mg by mouth every 6 (six) hours as needed for mild pain   alendronate (FOSAMAX) 35 mg tablet   No No   Sig: Take 1 tablet (35 mg total) by mouth once a week   amLODIPine (NORVASC) 5 mg tablet No No   Sig: Take 1 tablet (5 mg total) by mouth daily   calcium carbonate (TUMS) 500 mg chewable tablet   Yes No   Sig: Chew 1 tablet 2 (two) times a day   calcium carbonate-vitamin D (OSCAL-D) 500 mg-200 units per tablet   No No   Sig: Take 1 tablet by mouth 2 (two) times a day with meals   chlorproMAZINE (THORAZINE) 25 mg tablet   No No   Sig: Take 3 tablets (75 mg total) by mouth 2 (two) times a day   cholecalciferol 1000 units tablet   No No   Sig: Take 1 tablet (1,000 Units total) by mouth daily   divalproex sodium (DEPAKOTE) 250 mg EC tablet   No No   Sig: Take 1 tablet (250 mg total) by mouth daily   divalproex sodium (DEPAKOTE) 500 mg EC tablet   No No   Sig: Take 1 tablet (500 mg total) by mouth daily at bedtime   fexofenadine (ALLEGRA) 180 MG tablet   No No   Sig: Take 1 tablet (180 mg total) by mouth daily   folic acid (FOLVITE) 1 mg tablet   No No   Sig: Take 1 tablet (1 mg total) by mouth daily   levothyroxine 25 mcg tablet   No No   Sig: Take 1 tablet (25 mcg total) by mouth every other day   levothyroxine 50 mcg tablet   No No   Sig: Take 1 tablet (50 mcg total) by mouth every other day   melatonin 3 mg   No No   Sig: Take 1 tablet (3 mg total) by mouth daily at bedtime   metoprolol tartrate (LOPRESSOR) 25 mg tablet   No No   Sig: Take 1 tablet (25 mg total) by mouth every 12 (twelve) hours   miconazole 2 % cream   Yes No   Sig: Apply topically 2 (two) times a day   multivitamin (THERAGRAN) TABS   Yes No   Sig: Take 1 tablet by mouth daily   temazepam (RESTORIL) 15 mg capsule   No No   Sig: Take 1 capsule (15 mg total) by mouth daily at bedtime for 10 days   triamcinolone (KENALOG) 0 025 % cream   No No   Sig: Apply topically 2 (two) times a day      Facility-Administered Medications: None       Past Medical History:   Diagnosis Date    [facility]ipolar depression ([facility] )     Essential hypertension     Hypothyroidism     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[facility] )        Past Surgical History:   Procedure Laterality Date    TONSILLECTOMY      TOTAL HIP ARTHROPLASTY Right        Family History   Problem Relation Age of Onset    Diabetes Mother     Cancer Brother      I have reviewed and agree with the history as documented  Social History   Substance Use Topics    Smoking status: Never Smoker    Smokeless tobacco: Never Used    Alcohol use No        Review of Systems   Constitutional: Negative for appetite change, chills, fatigue and fever  HENT: Negative for hearing loss and trouble swallowing  Eyes: Negative for redness and itching  Respiratory: Negative for cough, chest tightness, shortness of breath and wheezing  Cardiovascular: Negative for chest pain  Gastrointestinal: Negative for abdominal pain, constipation, diarrhea, nausea and vomiting  Endocrine: Negative  Genitourinary: Negative for difficulty urinating and dysuria  Musculoskeletal: Positive for arthralgias  Negative for back pain, joint swelling, myalgias, neck pain and neck stiffness  Skin: Negative for rash and wound  Allergic/Immunologic: Negative  Neurological: Negative for dizziness, seizures, syncope, facial asymmetry, speech difficulty, weakness, light-headedness, numbness and headaches  Hematological: Negative  Psychiatric/Behavioral: Negative  Physical Exam  Physical Exam   Constitutional: She is oriented to person, place, and time  She appears well-developed and well-nourished  HENT:   Head: Normocephalic and atraumatic  Right Ear: External ear normal    Left Ear: External ear normal    Nose: Nose normal    Mouth/Throat: Oropharynx is clear and moist    Eyes: Conjunctivae and EOM are normal  Pupils are equal, round, and reactive to light  Neck: Normal range of motion  Neck supple  Cardiovascular: Normal rate, regular rhythm, normal heart sounds and intact distal pulses  Pulmonary/Chest: Effort normal and breath sounds normal  No respiratory distress  She has no wheezes  Abdominal: Soft   Bowel sounds are normal  There is no tenderness  There is no guarding  Musculoskeletal: She exhibits no edema, tenderness or deformity  Arms:  Neurological: She is alert and oriented to person, place, and time  Skin: Skin is warm and dry  Capillary refill takes less than 2 seconds  No rash noted  Psychiatric: She has a normal mood and affect  Her behavior is normal    Nursing note and vitals reviewed  Vital Signs  ED Triage Vitals [07/29/18 1416]   Temperature Pulse Respirations Blood Pressure SpO2   97 8 °F (36 6 °C) 63 18 (!) 92/43 96 %      Temp Source Heart Rate Source Patient Position - Orthostatic VS BP Location FiO2 (%)   Temporal -- -- -- --      Pain Score       --           Vitals:    07/29/18 1416   BP: (!) 92/43   Pulse: 63       Visual Acuity      ED Medications  Medications   acetaminophen (TYLENOL) tablet 650 mg (650 mg Oral Given 7/29/18 1451)       Diagnostic Studies  Results Reviewed     None                 XR shoulder 2+ views RIGHT   Final Result by Darian Shepherd MD (07/29 1604)      No acute osseous abnormality  Workstation performed: [id number]         XR hip/pelv 2-3 vws right if performed   Final Result by Darian Shepherd MD (07/29 0997)      No acute osseous abnormality  Workstation performed: [id number]         CT spine cervical without contrast   Final Result by Darian Shepherd MD (07/29 1534)      No cervical spine fracture or traumatic malalignment  Workstation performed: [id number]         CT head without contrast   Final Result by Darian Shepherd MD (07/29 1536)      No acute intracranial abnormality                    Workstation performed: [id number]                    Procedures  Procedures       Phone Contacts  ED Phone Contact    ED Course                               MDM  Number of Diagnoses or Management Options  Contusion:   Fall, initial encounter:   Hip pain:   Traumatic injury of head, initial encounter:   Diagnosis management comments: Patient presents status post fall from standing  Her workup is unremarkable for any acute trauma  Patient complains of several areas of localized tenderness, mostly muscular  Discussed supportive care measures as well as reasons to return to the emergency department  I suspect that most patient has a mild concussion after head injury  Amount and/or Complexity of Data Reviewed  Tests in the radiology section of CPT®: ordered and reviewed  Independent visualization of images, tracings, or specimens: yes      CritCare Time    Disposition  Final diagnoses:   Fall, initial encounter   Contusion   Hip pain   Traumatic injury of head, initial encounter     Time reflects when diagnosis was documented in both MDM as applicable and the Disposition within this note     Time User Action Codes Description Comment    7/29/2018  4:05 PM Dennie Brenner Add [M03  FFHZ] Fall, initial encounter     7/29/2018  4:05 PM Elliot Linn, [address]  [id number]] Contusion     7/29/2018  4:05 PM Dennie Brenner Add [M25 [id number]] Hip pain     7/29/2018  4:06 PM Dennie Brenner Add [S09 90XA] Traumatic injury of head, initial encounter       ED Disposition     None      Follow-up Information     Follow up With Specialties Details Why Contact Info    Phineas Mohs, MD Geriatric Medicine   [address] [place] [address]  [phone]            Patient's Medications   Discharge Prescriptions    No medications on file     No discharge procedures on file      ED Provider  Electronically Signed by           Lele Hickman DO  07/29/18 5472

## 2018-07-29 NOTE — ED NOTES
No ecchymosis/ hematoma noted to head - pt   Unable to identify where on her head she actually has pain      Kathia Charles, SUKHJINDER  07/29/18 [address], RN  07/29/18 6600

## 2018-07-29 NOTE — ED NOTES
Dressed by staff with pt   Assisting - full ROM noted to arms when placing on shirt/sweater- able to ambulate to w/c     Lance Storey RN  07/29/18 1960

## 2018-07-29 NOTE — ED NOTES
Pt  Refused tylenol  - states that she took some  Today -"right before she got here"     Laura Scruggs RN  07/29/18 690 Babs Moya RN  07/29/18 0580 [address] Francisco Javier Lindsay RN  07/29/18 1458

## 2018-07-29 NOTE — ED NOTES
Patient change into hospital gown and made comfortable   Patient belonging are place in a bag     Margarita Tipton  07/29/18 1069

## 2018-07-29 NOTE — ED NOTES
Pt  Anxious - states that her " wing bone is hurting" - points to rt  shoulder area - able to move rt   Arm without difficulty - MD brian Alejo, RN  07/29/18 Holli Jones RN  07/29/18 7887

## 2018-07-29 NOTE — DISCHARGE INSTRUCTIONS
Head Injury   WHAT YOU NEED TO KNOW:   A head injury is most often caused by a blow to the head  This may occur from a fall, bicycle injury, sports injury, being struck in the head, or a motor vehicle accident  DISCHARGE INSTRUCTIONS:   Call 911 or have someone else call for any of the following:   · You cannot be woken  · You have a seizure  · You stop responding to others or you faint  · You have blurry or double vision  · Your speech becomes slurred or confused  · You have arm or leg weakness, loss of feeling, or new problems with coordination  · Your pupils are larger than usual or one pupil is a different size than the other  · You have blood or clear fluid coming out of your ears or nose  Return to the emergency department if:   · You have repeated or forceful vomiting  · You feel confused  · Your headache gets worse or becomes severe  · You or someone caring for you notices that you are harder to wake than usual   Contact your healthcare provider if:   · Your symptoms last longer than 6 weeks after the injury  · You have questions or concerns about your condition or care  Medicines:   · Acetaminophen  decreases pain  Acetaminophen is available without a doctor's order  Ask how much to take and how often to take it  Follow directions  Acetaminophen can cause liver damage if not taken correctly  · Take your medicine as directed  Contact your healthcare provider if you think your medicine is not helping or if you have side effects  Tell him or her if you are allergic to any medicine  Keep a list of the medicines, vitamins, and herbs you take  Include the amounts, and when and why you take them  Bring the list or the pill bottles to follow-up visits  Carry your medicine list with you in case of an emergency  Self-care:   · Rest  or do quiet activities for 24 to 48 hours  Limit your time watching TV, using the computer, or doing tasks that require a lot of thinking  Slowly return to your normal activities as directed  Do not play sports or do activities that may cause you to get hit in the head  Ask your healthcare provider when you can return to sports  · Apply ice  on your head for 15 to 20 minutes every hour or as directed  Use an ice pack, or put crushed ice in a plastic bag  Cover it with a towel before you apply it to your skin  Ice helps prevent tissue damage and decreases swelling and pain  · Have someone stay with you for 24 hours  or as directed  This person can monitor you for complications and call [phone]  When you are awake the person should ask you a few questions to see if you are thinking clearly  An example would be to ask your name or your address  Prevent another head injury:   · Wear a helmet that fits properly  Do this when you play sports, or ride a bike, scooter, or skateboard  Helmets help decrease your risk of a serious head injury  Talk to your healthcare provider about other ways you can protect yourself if you play sports  · Wear your seat belt every time you are in a car  This helps to decrease your risk for a head injury if you are in a car accident  Follow up with your healthcare provider as directed:  Write down your questions so you remember to ask them during your visits  © 2017 [address] Information is for End User's use only and may not be sold, redistributed or otherwise used for commercial purposes  All illustrations and images included in CareNotes® are the copyrighted property of A D A M , Inc  or Justin Townsend  The above information is an  only  It is not intended as medical advice for individual conditions or treatments  Talk to your doctor, nurse or pharmacist before following any medical regimen to see if it is safe and effective for you      Hip Pain   WHAT YOU NEED TO KNOW:   Hip pain can be caused by a number of conditions, such as bursitis, arthritis, or muscle or tendon strain  X-rays do not show broken bones  You may have swelling in the fluid-filled sacs that protect your muscles and tendons  Hip pain can also be caused by a lower back problem  Hip pain may be caused by trauma, playing sports, or running  Your pain may start in your hip and go to your thigh, buttock, or groin  DISCHARGE INSTRUCTIONS:   Medicines:   · NSAIDs , such as ibuprofen, help decrease swelling, pain, and fever  This medicine is available with or without a doctor's order  NSAIDs can cause stomach bleeding or kidney problems in certain people  If you take blood thinner medicine, always ask your healthcare provider if NSAIDs are safe for you  Always read the medicine label and follow directions  · Take your medicine as directed  Contact your healthcare provider if you think your medicine is not helping or if you have side effects  Tell him of her if you are allergic to any medicine  Keep a list of the medicines, vitamins, and herbs you take  Include the amounts, and when and why you take them  Bring the list or the pill bottles to follow-up visits  Carry your medicine list with you in case of an emergency  Return to the emergency department if:   · Your pain gets worse  · You have numbness in your leg or toes  · You cannot put any weight on or move your hip  Contact your healthcare provider if:   · You have a fever  · Your pain does not decrease, even after treatment  · You have questions or concerns about your condition or care  Follow up with your healthcare provider as directed: You may need physical therapy, an injection, or more testing  You may need to see an orthopedic specialist  Write down your questions so you remember to ask them during your visits  Manage your hip pain:   · Rest  your injured hip so that it can heal  You may need to avoid putting any weight on your hip for at least 48 hours  Return to normal activities as directed      · Ice  the injury for 20 minutes every 4 hours, or as directed  Use an ice pack, or put crushed ice in a plastic bag  Cover it with a towel to protect your skin  Ice helps prevent tissue damage and decreases swelling and pain  · Elevate  your injured hip above the level of your heart as often as you can  This will help decrease swelling and pain  If possible, prop your hip and leg on pillows or blankets to keep the area elevated comfortably  · Maintain a healthy weight  Extra body weight can cause pressure and pain in your hip, knee, and ankle joints  Ask your healthcare provider how much you should weigh  Ask him to help you create a weight loss plan if you are overweight  · Use assistive devices as directed  You may need to use a cane or crutches  Assistive devices help decrease pain and pressure on your hip when you walk  Ask your healthcare provider for more information about assistive devices and how to use them correctly  © 2017 [address] Information is for End User's use only and may not be sold, redistributed or otherwise used for commercial purposes  All illustrations and images included in CareNotes® are the copyrighted property of A D A M , Inc  or Justin Townsend  The above information is an  only  It is not intended as medical advice for individual conditions or treatments  Talk to your doctor, nurse or pharmacist before following any medical regimen to see if it is safe and effective for you  Fall Prevention   WHAT YOU NEED TO KNOW:   Fall prevention includes ways to make your home and other areas safer  It also includes ways you can move more carefully to prevent a fall  Health conditions that cause changes in your blood pressure, vision, or muscle strength and coordination may increase your risk for falls  Medicines may also increase your risk for falls if they make you dizzy, weak, or sleepy     DISCHARGE INSTRUCTIONS:   Call 911 or have someone else call if:   · You have fallen and are unconscious  · You have fallen and cannot move part of your body  Contact your healthcare provider if:   · You have fallen and have pain or a headache  · You have questions or concerns about your condition or care  Fall prevention tips:   · Stand or sit up slowly  This may help you keep your balance and prevent falls  · Use assistive devices as directed  Your healthcare provider may suggest that you use a cane or walker to help you keep your balance  You may need to have grab bars put in your bathroom near the toilet or in the shower  · Wear shoes that fit well and have soles that   Wear shoes both inside and outside  Use slippers with good   Do not wear shoes with high heels  · Wear a personal alarm  This is a device that allows you to call 911 if you fall and need help  Ask your healthcare provider for more information  · Stay active  Exercise can help strengthen your muscles and improve your balance  Your healthcare provider may recommend water aerobics or walking  He or she may also recommend physical therapy to improve your coordination  Never start an exercise program without talking to your healthcare provider first      · Manage your medical conditions  Keep all appointments with your healthcare providers  Visit your eye doctor as directed  Home safety tips:   · Add items to prevent falls in the bathroom  Put nonslip strips on your bath or shower floor to prevent you from slipping  Use a bath mat if you do not have carpet in the bathroom  This will prevent you from falling when you step out of the bath or shower  Use a shower seat so you do not need to stand while you shower  Sit on the toilet or a chair in your bathroom to dry yourself and put on clothing  This will prevent you from losing your balance from drying or dressing yourself while you are standing  · Keep paths clear  Remove books, shoes, and other objects from walkways and stairs   Place cords for telephones and lamps out of the way so that you do not need to walk over them  Tape them down if you cannot move them  Remove small rugs  If you cannot remove a rug, secure it with double-sided tape  This will prevent you from tripping  · Install bright lights in your home  Use night lights to help light paths to the bathroom or kitchen  Always turn on the light before you start walking  · Keep items you use often on shelves within reach  Do not use a step stool to help you reach an item  · Paint or place reflective tape on the edges of your stairs  This will help you see the stairs better  Follow up with your healthcare provider as directed:  Write down your questions so you remember to ask them during your visits  © 2017 [address] [facility] Information is for End User's use only and may not be sold, redistributed or otherwise used for commercial purposes  All illustrations and images included in CareNotes® are the copyrighted property of A D A M , Inc  or Justin Townsend  The above information is an  only  It is not intended as medical advice for individual conditions or treatments  Talk to your doctor, nurse or pharmacist before following any medical regimen to see if it is safe and effective for you

## 2018-08-27 ENCOUNTER — OFFICE VISIT (OUTPATIENT)
Dept: UROLOGY | Facility: MEDICAL CENTER | Age: 78
End: 2018-08-27
Payer: MEDICARE

## 2018-08-27 ENCOUNTER — TELEPHONE (OUTPATIENT)
Dept: UROLOGY | Facility: MEDICAL CENTER | Age: 78
End: 2018-08-27

## 2018-08-27 VITALS
DIASTOLIC BLOOD PRESSURE: 70 MMHG | WEIGHT: 125 LBS | HEIGHT: 64 IN | BODY MASS INDEX: 21.34 KG/M2 | SYSTOLIC BLOOD PRESSURE: 120 MMHG

## 2018-08-27 DIAGNOSIS — R35.0 FREQUENCY OF URINATION: Primary | ICD-10-CM

## 2018-08-27 LAB
SL AMB  POCT GLUCOSE, UA: NORMAL
SL AMB LEUKOCYTE ESTERASE,UA: NORMAL
SL AMB POCT BILIRUBIN,UA: NORMAL
SL AMB POCT BLOOD,UA: NORMAL
SL AMB POCT CLARITY,UA: CLEAR
SL AMB POCT COLOR,UA: YELLOW
SL AMB POCT KETONES,UA: NORMAL
SL AMB POCT NITRITE,UA: NORMAL
SL AMB POCT PH,UA: 6.5
SL AMB POCT SPECIFIC GRAVITY,UA: 1.01
SL AMB POCT URINE PROTEIN: NORMAL
SL AMB POCT UROBILINOGEN: 0.2

## 2018-08-27 PROCEDURE — 99213 OFFICE O/P EST LOW 20 MIN: CPT | Performed by: UROLOGY

## 2018-08-27 PROCEDURE — 81003 URINALYSIS AUTO W/O SCOPE: CPT | Performed by: UROLOGY

## 2018-08-27 RX ORDER — ASCORBIC ACID 500 MG
500 TABLET ORAL DAILY
COMMUNITY
End: 2020-01-07 | Stop reason: ALTCHOICE
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
## 2018-09-25 ENCOUNTER — PROCEDURE VISIT (OUTPATIENT)
Dept: UROLOGY | Facility: MEDICAL CENTER | Age: 78
End: 2018-09-25

## 2018-09-25 DIAGNOSIS — R39.15 URGENCY OF URINATION: Primary | ICD-10-CM

## 2018-09-25 DIAGNOSIS — R35.0 FREQUENT URINATION: ICD-10-CM

## 2018-09-25 DIAGNOSIS — N39.41 URGE INCONTINENCE OF URINE: ICD-10-CM

## 2018-09-25 NOTE — PROGRESS NOTES
Procedure: Percutaneous Tibial Nerve Stimulation (PTNS)       Date onset of symptoms A LONG TIME AGO  Behavior modification: NO  Biofeedback: NO  E-Stim: NO  Drugs: MYRBETRIQ DIDN'T WORK  Other: SEVERAL ANTICHOLINERGICS DIDN'T WORK  Session number: #     Patient Goals and Progress   Decreased urgency: YES  Decreased frequency: YES  No accidents: YES    Sleeps through the night: YES  No related health and social factors: NO     Caffeine - cups per day: 0-2  Alcohol - number of drinks per day: 0-2  Daytime voids - number per day: Q2 HOURS  Nighttime voids - number per night: 1-2  Urgency: 4      Incontinence - episodes per day: 0-1     Treatment Plan   Increase fluids: NO  Decrease fluids: YES    Decrease caffeine: YES  Urge reduction techniques: YES  Kegels: NO    Toilet every 3-4 hours     No fluids before bed YES     Ankle Used: LEFT     Treatment settin   Duration of treatment: 30 MINUTES  Lead lot #: [id number]  Expiration Date: 10-       Feeling/Response:  Toe flex and foot sensation

## 2018-09-26 NOTE — PROGRESS NOTES
I have reviewed the notes, assessments, and/or procedures performed , I concur with her/his documentation of Shara Johnson

## 2018-09-26 NOTE — PROGRESS NOTES
I have reviewed the notes, assessments, and/or procedures performed , I concur with her/his documentation of Fabrizio Perez

## 2018-09-27 DIAGNOSIS — Z12.39 BREAST CANCER SCREENING: Primary | ICD-10-CM

## 2018-10-02 ENCOUNTER — PROCEDURE VISIT (OUTPATIENT)
Dept: UROLOGY | Facility: MEDICAL CENTER | Age: 78
End: 2018-10-02
Payer: MEDICARE

## 2018-10-02 DIAGNOSIS — R39.15 URGENCY OF URINATION: ICD-10-CM

## 2018-10-02 DIAGNOSIS — R35.0 URINARY FREQUENCY: ICD-10-CM

## 2018-10-02 DIAGNOSIS — N39.41 URGE INCONTINENCE: ICD-10-CM

## 2018-10-02 PROCEDURE — 64566 NEUROELTRD STIM POST TIBIAL: CPT

## 2018-10-02 NOTE — PROGRESS NOTES
Procedure: Percutaneous Tibial Nerve Stimulation (PTNS)       Date onset of symptoms A LONG TIME AGO  Behavior modification: NO  Biofeedback: NO  E-Stim: NO  Drugs: MYRBETRIQ DIDN'T WORK  Other: SEVERAL ANTICHOLINERGICS DIDN'T WORK  Session number: #2/      Patient Goals and Progress   Decreased urgency: YES  Decreased frequency: YES  No accidents: YES    Sleeps through the night: YES  No related health and social factors: NO     Caffeine - cups per day: 0-1  Alcohol - number of drinks per day: 0-2    Daytime voids - number per day: 3-4    Nighttime voids - number per night: 0-3  Urgency: 0-1      Incontinence - episodes per day: 0-1     Treatment Plan   Increase fluids: NO  Decrease fluids: YES    Decrease caffeine: YES  Urge reduction techniques: YES  Kegels: NO    Toilet every 3-4 hours     No fluids before bed YES     Ankle Used: LEFT     Treatment settin  Duration of treatment: 30 MINUTES  Lead lot #: [id number]  Expiration Date: 10-       Feeling/Response:  Toe flex and foot sensation

## 2018-10-05 DIAGNOSIS — Z12.39 ENCOUNTER FOR SCREENING BREAST EXAMINATION: Primary | ICD-10-CM

## 2018-10-16 ENCOUNTER — PROCEDURE VISIT (OUTPATIENT)
Dept: UROLOGY | Facility: MEDICAL CENTER | Age: 78
End: 2018-10-16
Payer: MEDICARE

## 2018-10-16 DIAGNOSIS — R39.15 URGENCY OF URINATION: ICD-10-CM

## 2018-10-16 DIAGNOSIS — N39.41 URGE INCONTINENCE: ICD-10-CM

## 2018-10-16 DIAGNOSIS — R35.0 URINARY FREQUENCY: ICD-10-CM

## 2018-10-16 PROCEDURE — 64566 NEUROELTRD STIM POST TIBIAL: CPT

## 2018-10-16 NOTE — PROGRESS NOTES
I have reviewed the notes, assessments, and/or procedures performed , I concur with her/his documentation of Tommas Spore

## 2018-10-16 NOTE — PROGRESS NOTES
Procedure: Percutaneous Tibial Nerve Stimulation (PTNS)       Date onset of symptoms A LONG TIME AGO  Behavior modification: NO  Biofeedback: NO  E-Stim: NO  Drugs: MYRBETRIQ DIDN'T WORK   Other: SEVERAL ANTICHOLINERGICS DIDN'T WORK  Session number: #3/     Patient Goals and Progress   Decreased urgency: YES  Decreased frequency: YES  No accidents: YES    Sleeps through the night: YES  No related health and social factors: NO     Caffeine - cups per day: 0  Alcohol - number of drinks per day: 0   Daytime voids - number per day: 4    Nighttime voids - number per night: 1-3   Urgency: 0      Incontinence - episodes per day: 0     Treatment Plan   Increase fluids: NO  Decrease fluids: YES    Decrease caffeine: YES  Urge reduction techniques: YES  Kegels: NO    Toilet every 3-4 hours     No fluids before bed YES     Ankle Used: LEFT     Treatment settin   Duration of treatment: 30 MINUTES  Lead lot #: [id number]  Expiration Date: 10-       Feeling/Response:  Toe flex and foot sensation

## 2018-10-16 NOTE — PROGRESS NOTES
I have reviewed the notes, assessments, and/or procedures performed , I concur with her/his documentation of Sofia Silva

## 2018-10-23 ENCOUNTER — PROCEDURE VISIT (OUTPATIENT)
Dept: UROLOGY | Facility: MEDICAL CENTER | Age: 78
End: 2018-10-23
Payer: MEDICARE

## 2018-10-23 DIAGNOSIS — R39.15 URGENCY OF URINATION: ICD-10-CM

## 2018-10-23 DIAGNOSIS — R35.0 URINARY FREQUENCY: ICD-10-CM

## 2018-10-23 DIAGNOSIS — N39.41 URGE INCONTINENCE: ICD-10-CM

## 2018-10-23 PROCEDURE — 64566 NEUROELTRD STIM POST TIBIAL: CPT

## 2018-10-23 NOTE — PROGRESS NOTES
I have reviewed the notes, assessments, and/or procedures performed, I concur with her/his documentation of Raegan Kruger

## 2018-10-23 NOTE — PROGRESS NOTES
Procedure: Percutaneous Tibial Nerve Stimulation (PTNS)       Date onset of symptoms A LONG TIME AGO  Behavior modification: NO  Biofeedback: NO  E-Stim: NO  Drugs: MYRBETRIQ DIDN'T WORK  Other: SEVERAL ANTICHOLINERGICS DIDN'T WORK  Session number: #4/     Patient Goals and Progress   Decreased urgency: YES  Decreased frequency: YES  No accidents: YES    Sleeps through the night: YES  No related health and social factors: NO     Caffeine - cups per day: 0  Alcohol - number of drinks per day: 0-1   Daytime voids - number per day: 4   Nighttime voids - number per night: 0-2   Urgency: 0-1      Incontinence - episodes per day: 0     Treatment Plan   Increase fluids: NO  Decrease fluids: YES    Decrease caffeine: YES  Urge reduction techniques: YES  Kegels: NO  Toilet every 3-4 hours     No fluids before bed YES     Ankle Used: LEFT     Treatment settin  Duration of treatment: 30 MINUTES  Lead lot #: [id number]  Expiration Date: 10-       Feeling/Response:  Toe flex and foot sensation

## 2018-12-04 ENCOUNTER — ANNUAL EXAM (OUTPATIENT)
Dept: OBGYN CLINIC | Facility: CLINIC | Age: 78
End: 2018-12-04
Payer: MEDICARE

## 2018-12-04 VITALS
WEIGHT: 122 LBS | HEIGHT: 63 IN | SYSTOLIC BLOOD PRESSURE: 100 MMHG | DIASTOLIC BLOOD PRESSURE: 64 MMHG | BODY MASS INDEX: 21.62 KG/M2

## 2018-12-04 DIAGNOSIS — M85.852 OSTEOPENIA OF NECK OF LEFT FEMUR: ICD-10-CM

## 2018-12-04 DIAGNOSIS — Z12.31 ENCOUNTER FOR SCREENING MAMMOGRAM FOR BREAST CANCER: ICD-10-CM

## 2018-12-04 DIAGNOSIS — Z01.419 ENCOUNTER FOR ANNUAL ROUTINE GYNECOLOGICAL EXAMINATION: Primary | ICD-10-CM

## 2018-12-04 PROCEDURE — G0101 CA SCREEN;PELVIC/BREAST EXAM: HCPCS | Performed by: OBSTETRICS & GYNECOLOGY

## 2018-12-04 RX ORDER — LORAZEPAM 0.5 MG/1
0.5 TABLET ORAL EVERY 8 HOURS PRN
COMMUNITY
End: 2020-12-22 | Stop reason: HOSPADM

## 2018-12-04 RX ORDER — METOPROLOL TARTRATE 50 MG/1
TABLET, FILM COATED ORAL
COMMUNITY
Start: 2018-10-01 | End: 2020-01-07 | Stop reason: SDUPTHER

## 2018-12-04 RX ORDER — AMLODIPINE BESYLATE 2.5 MG/1
TABLET ORAL
COMMUNITY
Start: 2018-10-18 | End: 2020-01-07 | Stop reason: ALTCHOICE

## 2018-12-04 RX ORDER — ACETAMINOPHEN 500 MG
500 TABLET ORAL EVERY 6 HOURS PRN
COMMUNITY
Start: 2017-12-26

## 2018-12-04 RX ORDER — TEMAZEPAM 30 MG/1
CAPSULE ORAL
COMMUNITY
Start: 2018-11-28 | End: 2020-12-22 | Stop reason: HOSPADM

## 2018-12-04 RX ORDER — NEPAFENAC 0.3 %
SUSPENSION, DROPS(FINAL DOSAGE FORM)(ML) OPHTHALMIC (EYE)
COMMUNITY
Start: 2018-10-30 | End: 2020-12-22 | Stop reason: HOSPADM

## 2018-12-04 RX ORDER — LITHIUM CARBONATE 300 MG/1
CAPSULE ORAL
COMMUNITY
Start: 2018-12-02 | End: 2020-12-22 | Stop reason: HOSPADM

## 2018-12-04 RX ORDER — GATIFLOXACIN 5 MG/ML
SOLUTION/ DROPS OPHTHALMIC
COMMUNITY
Start: 2018-11-01 | End: 2020-01-07 | Stop reason: ALTCHOICE

## 2018-12-04 RX ORDER — DIPHENOXYLATE HYDROCHLORIDE AND ATROPINE SULFATE 2.5; .025 MG/1; MG/1
TABLET ORAL
COMMUNITY
Start: 2018-10-02 | End: 2020-01-07 | Stop reason: ALTCHOICE

## 2018-12-04 RX ORDER — ALENDRONATE SODIUM 35 MG/1
35 TABLET ORAL WEEKLY
Qty: 12 TABLET | Refills: 3 | Status: SHIPPED | OUTPATIENT
Start: 2018-12-04 | End: 2020-01-07 | Stop reason: ALTCHOICE

## 2018-12-04 RX ORDER — OFLOXACIN 3 MG/ML
SOLUTION/ DROPS OPHTHALMIC
COMMUNITY
Start: 2018-11-02 | End: 2020-01-07 | Stop reason: ALTCHOICE

## 2018-12-04 RX ORDER — CHOLESTYRAMINE LIGHT 4 G/5.7G
POWDER, FOR SUSPENSION ORAL
COMMUNITY
Start: 2018-10-15 | End: 2020-01-07 | Stop reason: ALTCHOICE

## 2018-12-04 RX ORDER — TEMAZEPAM 15 MG/1
CAPSULE ORAL
COMMUNITY
Start: 2018-10-13 | End: 2018-12-04

## 2018-12-04 RX ORDER — HYDROCHLOROTHIAZIDE 25 MG/1
TABLET ORAL
COMMUNITY
Start: 2018-09-22 | End: 2020-12-22 | Stop reason: HOSPADM

## 2018-12-04 RX ORDER — TRIPROLIDINE/PSEUDOEPHEDRINE 2.5MG-60MG
TABLET ORAL
COMMUNITY
Start: 2018-10-13 | End: 2020-01-07 | Stop reason: ALTCHOICE

## 2018-12-04 RX ORDER — PREDNISOLONE ACETATE 10 MG/ML
SUSPENSION/ DROPS OPHTHALMIC
COMMUNITY
Start: 2018-11-02 | End: 2020-01-07 | Stop reason: ALTCHOICE

## 2018-12-04 RX ORDER — CHLORHEXIDINE GLUCONATE 0.12 MG/ML
RINSE ORAL
COMMUNITY
Start: 2018-11-18 | End: 2020-01-07 | Stop reason: ALTCHOICE

## 2018-12-04 NOTE — PROGRESS NOTES
Assessment/Plan:    Osteopenia - She is overdue for DEXA, recommended she has this done as she may be able to stop the Fosamax  RX given so she can continue until the DEXA is done  Mammogram reviewed with her, she has dense breast tissue  3D mammogram ordered for next year  Discussed self breast exam    She is not sure when her last colonoscopy was but she states Dr Yara Coyne follows this  No problem-specific Assessment & Plan notes found for this encounter  Diagnoses and all orders for this visit:    Encounter for annual routine gynecological examination    Encounter for screening mammogram for breast cancer  -     Mammo screening bilateral w 3d & cad; Future    Osteopenia of neck of left femur  -     alendronate (FOSAMAX) 35 mg tablet; Take 1 tablet (35 mg total) by mouth once a week  -     DXA bone density spine hip and pelvis; Future    Other orders  -     amLODIPine (NORVASC) 2 5 mg tablet;   -     chlorhexidine (PERIDEX) 0 12 % solution;   -     cholestyramine light (PREVALITE) 4 GM/DOSE powder;   -     DUREZOL 0 05 % EMUL;   -     diphenoxylate-atropine (LOMOTIL) 2 5-0 025 mg per tablet;   -     gatifloxacin (ZYMAXID) 0 5 %;   -     hydrochlorothiazide (HYDRODIURIL) 25 mg tablet;   -     lithium carbonate 300 mg capsule;   -     LORazepam (ATIVAN) 0 5 mg tablet; Take 0 5 mg by mouth every 8 (eight) hours as needed  -     metoprolol tartrate (LOPRESSOR) 50 mg tablet;   -     ILEVRO 0 3 % SUSP;   -     ofloxacin (OCUFLOX) 0 3 % ophthalmic solution;   -     prednisoLONE acetate (PRED FORTE) 1 % ophthalmic suspension;   -     Discontinue: temazepam (RESTORIL) 15 mg capsule;   -     temazepam (RESTORIL) 30 mg capsule;   -     acetaminophen (TYLENOL) 500 mg tablet; Take 500 mg by mouth every 6 (six) hours as needed          Subjective:      Patient ID: Shereen Lovelace is a 77 y o  female  Patient here for yearly   She is on fosamax 35mg for osteopenia with an increased fracture risk  She had a normal mammogram in November, she has dense breast tissue  The following portions of the patient's history were reviewed and updated as appropriate: allergies, current medications, past family history, past medical history, past social history, past surgical history and problem list     Review of Systems   Constitutional: Negative  HENT: Negative  Eyes: Negative  Respiratory: Negative  Cardiovascular: Negative  Gastrointestinal: Negative  Endocrine: Negative  Genitourinary: Negative  Musculoskeletal: Negative  Skin: Negative  Allergic/Immunologic: Negative  Neurological: Negative  Hematological: Negative  Psychiatric/Behavioral: Negative  Objective:      /64 (BP Location: Left arm, Patient Position: Standing, Cuff Size: Standard)   Ht 5' 2 5" (1 588 m)   Wt 55 3 kg (122 lb)   BMI 21 96 kg/m²          Physical Exam   Constitutional: She appears well-developed  Neck: No tracheal deviation present  No thyromegaly present  Cardiovascular: Normal rate and regular rhythm  Pulmonary/Chest: Effort normal and breath sounds normal  Right breast exhibits no inverted nipple, no mass, no nipple discharge, no skin change and no tenderness  Left breast exhibits no inverted nipple, no mass, no nipple discharge, no skin change and no tenderness  Breasts are symmetrical    Examined seated and supine   Abdominal: Soft  She exhibits no distension and no mass  There is no tenderness  Genitourinary: Rectum normal, vagina normal and uterus normal  No labial fusion  There is no rash, tenderness, lesion or injury on the right labia  There is no rash, tenderness, lesion or injury on the left labia  Cervix exhibits no motion tenderness, no discharge and no friability  Right adnexum displays no mass, no tenderness and no fullness  Left adnexum displays no mass, no tenderness and no fullness  Vitals reviewed

## 2019-01-03 ENCOUNTER — HOSPITAL ENCOUNTER (OUTPATIENT)
Dept: BONE DENSITY | Facility: CLINIC | Age: 79
Discharge: HOME | End: 2019-01-03
Payer: MEDICARE

## 2019-01-03 DIAGNOSIS — M85.852 OSTEOPENIA OF NECK OF LEFT FEMUR: ICD-10-CM

## 2019-01-03 PROCEDURE — 77080 DXA BONE DENSITY AXIAL: CPT

## 2019-10-18 ENCOUNTER — OFFICE VISIT (OUTPATIENT)
Dept: UROLOGY | Facility: MEDICAL CENTER | Age: 79
End: 2019-10-18
Payer: MEDICARE

## 2019-10-18 VITALS
HEART RATE: 64 BPM | BODY MASS INDEX: 21.85 KG/M2 | DIASTOLIC BLOOD PRESSURE: 82 MMHG | WEIGHT: 128 LBS | HEIGHT: 64 IN | SYSTOLIC BLOOD PRESSURE: 120 MMHG

## 2019-10-18 DIAGNOSIS — N39.41 URGE INCONTINENCE: ICD-10-CM

## 2019-10-18 DIAGNOSIS — R35.0 FREQUENCY OF URINATION: Primary | ICD-10-CM

## 2019-10-18 DIAGNOSIS — R39.15 URGENCY OF URINATION: ICD-10-CM

## 2019-10-18 LAB
SL AMB  POCT GLUCOSE, UA: NORMAL
SL AMB LEUKOCYTE ESTERASE,UA: NORMAL
SL AMB POCT BILIRUBIN,UA: NORMAL
SL AMB POCT BLOOD,UA: NORMAL
SL AMB POCT CLARITY,UA: CLEAR
SL AMB POCT COLOR,UA: NORMAL
SL AMB POCT KETONES,UA: NORMAL
SL AMB POCT NITRITE,UA: NORMAL
SL AMB POCT PH,UA: 5.5
SL AMB POCT SPECIFIC GRAVITY,UA: 1.02
SL AMB POCT URINE PROTEIN: NORMAL
SL AMB POCT UROBILINOGEN: 0.2

## 2019-10-18 PROCEDURE — 81003 URINALYSIS AUTO W/O SCOPE: CPT | Performed by: UROLOGY

## 2019-10-18 PROCEDURE — 99213 OFFICE O/P EST LOW 20 MIN: CPT | Performed by: UROLOGY

## 2019-10-18 NOTE — PROGRESS NOTES
[address] [facility]  [facility]  [address] [facility], [address] [place] [address]  [phone]  www  [facility]  org      NAME: Pretty Leos  AGE: 77 y o  SEX: female  : 1940   MRN: [id number]    DATE: 10/18/2019  TIME: 11:59 AM    Assessment and Plan:    Urgency, frequency and urge incontinence:  She also has nocturnal enuresis  She did very well with PT NS in the past, so I think we should resume this  We will schedule this  Her last treatment was 2018  Chief Complaint   No chief complaint on file  History of Present Illness   Follow-up urgency and frequency of urination:  She has failed several oral medications  She has been undergoing posterior tibial nerve stimulation  She actually did great on that  In the meantime, she has had several episodes in hospitalizations of C diff  Now that that is taking care of she is having problems with her bladder again  At night she does not wake up to go to the bathroom and she soaks the diapers  She is making a lot of urine at night  She has urge incontinence and sometimes leaks without warning during the daytime  The following portions of the patient's history were reviewed and updated as appropriate: allergies, current medications, past family history, past medical history, past social history, past surgical history and problem list   Past Medical History:   Diagnosis Date    Bipolar depression ([facility] [address] )     Essential hypertension     Hypothyroidism     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[facility] [address] )      Past Surgical History:   Procedure Laterality Date    TONSILLECTOMY      TOTAL HIP ARTHROPLASTY Right      shoulder  Review of Systems   Review of Systems   Genitourinary: Positive for frequency  Urge incontinence and nocturnal enuresis as above         Active Problem List     Patient Active Problem List   Diagnosis    Bipolar 1 disorder ([address]    Essential hypertension    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HCC)    Hypothyroidism    Bipolar depression ([facility] [address] )    Hyponatremia    Chronic leg pain    Seasonal allergies    Bilateral dry eyes    Age-related osteoporosis without current pathological fracture       Objective   There were no vitals taken for this visit  Physical Exam   Constitutional: She appears well-developed and well-nourished  HENT:   Head: Normocephalic and atraumatic  Eyes: EOM are normal    Neck: Normal range of motion  Pulmonary/Chest: Effort normal    Musculoskeletal:   Uses walker  Neurological: She is alert  Skin: Skin is warm and dry  Psychiatric: She has a normal mood and affect   Her behavior is normal  Judgment and thought content normal            Current Medications     Current Outpatient Medications:     acetaminophen (TYLENOL) 500 mg tablet, Take 500 mg by mouth every 6 (six) hours as needed, Disp: , Rfl:     alendronate (FOSAMAX) 35 mg tablet, Take 1 tablet (35 mg total) by mouth once a week, Disp: 12 tablet, Rfl: 3    amLODIPine (NORVASC) 2 5 mg tablet, , Disp: , Rfl:     amLODIPine (NORVASC) 5 mg tablet, Take 1 tablet (5 mg total) by mouth daily, Disp: 30 tablet, Rfl: 0    ascorbic acid (VITAMIN C) 500 mg tablet, Take 500 mg by mouth daily, Disp: , Rfl:     calcium carbonate (TUMS) 500 mg chewable tablet, Chew 1 tablet 2 (two) times a day, Disp: , Rfl:     calcium carbonate-vitamin D (OSCAL-D) 500 mg-200 units per tablet, Take 1 tablet by mouth 2 (two) times a day with meals, Disp: 60 tablet, Rfl: 0    chlorhexidine (PERIDEX) 0 12 % solution, , Disp: , Rfl:     chlorproMAZINE (THORAZINE) 25 mg tablet, Take 3 tablets (75 mg total) by mouth 2 (two) times a day, Disp: 180 tablet, Rfl: 0    cholecalciferol 1000 units tablet, Take 1 tablet (1,000 Units total) by mouth daily, Disp: 30 tablet, Rfl: 0    cholestyramine light (PREVALITE) 4 GM/DOSE powder, , Disp: , Rfl:     diphenoxylate-atropine (LOMOTIL) 2 5-0 025 mg per tablet, , Disp: , Rfl:    divalproex sodium (DEPAKOTE) 250 mg EC tablet, Take 1 tablet (250 mg total) by mouth daily, Disp: 30 tablet, Rfl: 0    divalproex sodium (DEPAKOTE) 500 mg EC tablet, Take 1 tablet (500 mg total) by mouth daily at bedtime, Disp: 30 tablet, Rfl: 0    DUREZOL 0 05 % EMUL, , Disp: , Rfl:     fexofenadine (ALLEGRA) 180 MG tablet, Take 1 tablet (180 mg total) by mouth daily, Disp: 30 tablet, Rfl: 0    folic acid (FOLVITE) 1 mg tablet, Take 1 tablet (1 mg total) by mouth daily, Disp: 30 tablet, Rfl: 0    gatifloxacin (ZYMAXID) 0 5 %, , Disp: , Rfl:     hydrochlorothiazide (HYDRODIURIL) 25 mg tablet, , Disp: , Rfl:     ILEVRO 0 3 % SUSP, , Disp: , Rfl:     levothyroxine 25 mcg tablet, Take 1 tablet (25 mcg total) by mouth every other day, Disp: 15 tablet, Rfl: 0    levothyroxine 50 mcg tablet, Take 1 tablet (50 mcg total) by mouth every other day, Disp: 15 tablet, Rfl: 0    lithium carbonate 300 mg capsule, , Disp: , Rfl:     LORazepam (ATIVAN) 0 5 mg tablet, Take 0 5 mg by mouth every 8 (eight) hours as needed, Disp: , Rfl:     melatonin 3 mg, Take 1 tablet (3 mg total) by mouth daily at bedtime, Disp: 30 tablet, Rfl: 0    metoprolol tartrate (LOPRESSOR) 25 mg tablet, Take 1 tablet (25 mg total) by mouth every 12 (twelve) hours, Disp: 60 tablet, Rfl: 0    metoprolol tartrate (LOPRESSOR) 50 mg tablet, , Disp: , Rfl:     miconazole 2 % cream, Apply topically 2 (two) times a day, Disp: , Rfl:     multivitamin (THERAGRAN) TABS, Take 1 tablet by mouth daily, Disp: , Rfl:     ofloxacin (OCUFLOX) 0 3 % ophthalmic solution, , Disp: , Rfl:     prednisoLONE acetate (PRED FORTE) 1 % ophthalmic suspension, , Disp: , Rfl:     temazepam (RESTORIL) 15 mg capsule, Take 1 capsule (15 mg total) by mouth daily at bedtime for 10 days, Disp: 30 capsule, Rfl: 0    temazepam (RESTORIL) 30 mg capsule, , Disp: , Rfl:     triamcinolone (KENALOG) 0 025 % cream, Apply topically 2 (two) times a day, Disp: 15 g, Rfl: 0        Lamar Shuttmarcus Jonathan Husbands, MD

## 2019-10-21 ENCOUNTER — TELEPHONE (OUTPATIENT)
Dept: UROLOGY | Facility: MEDICAL CENTER | Age: 79
End: 2019-10-21

## 2019-10-21 ENCOUNTER — TELEPHONE (OUTPATIENT)
Dept: UROLOGY | Facility: AMBULATORY SURGERY CENTER | Age: 79
End: 2019-10-21

## 2019-10-21 NOTE — TELEPHONE ENCOUNTER
----- Message from Briseida Bess sent at 10/18/2019  4:07 PM EDT -----  Regarding: PTNS  Dr Laguna Nones requesting patient have PTNS

## 2019-10-21 NOTE — TELEPHONE ENCOUNTER
L/M for pt to return call to schedule   ----- Message from Lola Leavitt MA sent at 10/18/2019  4:07 PM EDT -----  Regarding: PTNS  Dr Socorro Ramsey requesting patient have PTNS

## 2019-10-28 NOTE — TELEPHONE ENCOUNTER
Patient returning call   Asking for a call this afternoon to schedule PTNS    He can be reached at [phone]

## 2019-11-05 ENCOUNTER — PROCEDURE VISIT (OUTPATIENT)
Dept: UROLOGY | Facility: MEDICAL CENTER | Age: 79
End: 2019-11-05
Payer: MEDICARE

## 2019-11-05 DIAGNOSIS — R35.0 URINARY FREQUENCY: ICD-10-CM

## 2019-11-05 DIAGNOSIS — R39.15 URGENCY OF URINATION: ICD-10-CM

## 2019-11-05 DIAGNOSIS — N39.41 URGE INCONTINENCE: ICD-10-CM

## 2019-11-05 PROCEDURE — 64566 NEUROELTRD STIM POST TIBIAL: CPT

## 2019-11-05 NOTE — PROGRESS NOTES
2019    Pretty Leos  1940  [id number]    Diagnosis  Chief Complaint     Urinary Frequency; Urinary Urgency; Urinary Incontinence         Patient presents for PTNS  of 12 managed by Dr Funmi Solano one week for next PTNS tx    Procedure PTNS    History:  Date onset of symptoms: A long time ago  Behavior modification: no  Biofeedback:no  E-Stim:no  Drugs:  Myrbetriq didn't work  Other: Several anticholinergics didn't work    Session 5 of 12    Ankle used: left  Treatment settin  Duration of treatment: 30 minutes  Lead lot #:[id number]  Expiration Date:10-  Feeling/Response: Toe flex and foot sensation    Patient Goals and Progress  Decreased Urgency Yes  Decreased Frequency Yes  Episodes of incontinence Yes  Sleep Through Night Yes  Related Health and Social Factors No    Bladder Diary Summary:  Caffeine cups per day:0  Alcohol- number of drinks per day:0-1  Daytime voids:4  Nighttime voids:0-2  Urgency:0-1  Incontinence- episodes per day:0    Treatment Plan  Increase Fluids No  Decrease Caffeine Yes  Decrease FluidsYes  Urge reduction techniques Yes  Kegels No  Timed voiding No  No fluids before bed Yes          [employer]

## 2019-11-12 ENCOUNTER — PROCEDURE VISIT (OUTPATIENT)
Dept: UROLOGY | Facility: MEDICAL CENTER | Age: 79
End: 2019-11-12
Payer: MEDICARE

## 2019-11-12 DIAGNOSIS — R35.0 URINARY FREQUENCY: ICD-10-CM

## 2019-11-12 DIAGNOSIS — N39.41 URGE INCONTINENCE: ICD-10-CM

## 2019-11-12 DIAGNOSIS — R39.15 URGENCY OF URINATION: ICD-10-CM

## 2019-11-12 PROCEDURE — 64566 NEUROELTRD STIM POST TIBIAL: CPT

## 2019-11-12 NOTE — PROGRESS NOTES
11/12/2019    Josie Sandoval  1940  [id number]    Diagnosis  Chief Complaint     Urinary Frequency; Urinary Urgency; Urinary Incontinence         Patient presents for PTNS 6 of 12 managed by Dr Maurice Francisco one week for next PTNS tx    Procedure PTNS    History:  Date onset of symptoms: A long time ago  Behavior modification: no  Biofeedback:no  E-Stim:no  Drugs:  Myrbetriq didn't work  Other: Several anticholinergics didn't work    Session 6 of 12    Ankle used: left  Treatment setting:3  Duration of treatment: 30 minutes  Lead lot #:[id number]  Expiration Date:03-  Feeling/Response: Toe flex and foot sensation    Patient Goals and Progress  Decreased Urgency Yes  Decreased Frequency Yes  Episodes of incontinence Yes  Sleep Through Night Yes  Related Health and Social Factors No    Bladder Diary Summary:  Caffeine cups per day:0  Alcohol- number of drinks per day:0-1  Daytime voids:4  Nighttime voids:0-2  Urgency:0-1  Incontinence- episodes per day:0    Treatment Plan  Increase Fluids No  Decrease Caffeine Yes  Decrease FluidsYes  Urge reduction techniques Yes  Kegels No  Timed voiding No  No fluids before bed Yes          [employer]

## 2019-11-13 NOTE — PROGRESS NOTES
I supervised the Advanced Practitioner  I reviewed the Advanced Practitioner note and agree      Gabriel White MD 11/13/19

## 2019-11-19 ENCOUNTER — PROCEDURE VISIT (OUTPATIENT)
Dept: UROLOGY | Facility: MEDICAL CENTER | Age: 79
End: 2019-11-19
Payer: MEDICARE

## 2019-11-19 DIAGNOSIS — R35.0 URINARY FREQUENCY: ICD-10-CM

## 2019-11-19 DIAGNOSIS — R39.15 URGENCY OF URINATION: ICD-10-CM

## 2019-11-19 DIAGNOSIS — N39.41 URGE INCONTINENCE: ICD-10-CM

## 2019-11-19 PROCEDURE — 64566 NEUROELTRD STIM POST TIBIAL: CPT

## 2019-11-19 NOTE — PROGRESS NOTES
2019    Christina Monson  1940  [id number]    Diagnosis  Chief Complaint     Urinary Incontinence; Urinary Frequency; Urinary Urgency         Patient presents for PTNS  managed by Dr Arina Brenner one week for next PTNS tx    Procedure PTNS    History:  Date onset of symptoms: A long time ago  Behavior modification: no  Biofeedback:no  E-Stim:no  Drugs:  Myrbetriq didn't work  Other: Several anticholinergics didn't work    Session     Ankle used: right  Treatment settin  Duration of treatment: 30 minutes  Lead lot #:[id number]  Expiration Date:2022  Feeling/Response: Toe flex and foot sensation    Patient Goals and Progress  Decreased Urgency Yes  Decreased Frequency Yes  Episodes of incontinence Yes  Sleep Through Night Yes  Related Health and Social Factors No    Bladder Diary Summary:  Caffeine cups per day:0  Alcohol- number of drinks per day:0-1  Daytime voids:4  Nighttime voids:0-2  Urgency:0-1  Incontinence- episodes per day:0    Treatment Plan  Increase Fluids No  Decrease Caffeine Yes  Decrease FluidsYes  Urge reduction techniques Yes  Kegels No  Timed voiding No  No fluids before bed Yes          [employer]

## 2019-11-26 ENCOUNTER — PROCEDURE VISIT (OUTPATIENT)
Dept: UROLOGY | Facility: MEDICAL CENTER | Age: 79
End: 2019-11-26
Payer: MEDICARE

## 2019-11-26 DIAGNOSIS — R35.0 URINARY FREQUENCY: ICD-10-CM

## 2019-11-26 DIAGNOSIS — R39.15 URGENCY OF URINATION: ICD-10-CM

## 2019-11-26 DIAGNOSIS — N39.41 URGE INCONTINENCE: ICD-10-CM

## 2019-11-26 PROCEDURE — 64566 NEUROELTRD STIM POST TIBIAL: CPT

## 2019-11-26 NOTE — PROGRESS NOTES
2019    Fidelia Hubbard  1940  [id number]    Diagnosis  Chief Complaint     Urinary Urgency; Urinary Frequency; Urinary Incontinence         Patient presents for PTNS  managed by Dr Leisa Meigs one week for next PTNS tx    Procedure PTNS    History:  Date onset of symptoms: A long time ago  Behavior modification: no  Biofeedback:no  E-Stim:no  Drugs:  Myrbetriq didn't work  Other: Several anticholinergics didn't work    Ankle used: right  Treatment settin  Duration of treatment: 30 minutes  Lead lot #:[id number]  Expiration Date:2022  Feeling/Response: Toe flex and foot sensation    Patient Goals and Progress  Decreased Urgency Yes  Decreased Frequency Yes  Episodes of incontinence Yes  Sleep Through Night Yes  Related Health and Social Factors No    Bladder Diary Summary:  Caffeine cups per day:0  Alcohol- number of drinks per day:0-1  Daytime voids:4  Nighttime voids:0-2  Urgency:0-1  Incontinence- episodes per day:0    Treatment Plan  Increase Fluids No  Decrease Caffeine Yes  Decrease FluidsYes  Urge reduction techniques Yes  Kegels No  Timed voiding No  No fluids before bed Yes          [employer]

## 2019-12-03 ENCOUNTER — PROCEDURE VISIT (OUTPATIENT)
Dept: UROLOGY | Facility: MEDICAL CENTER | Age: 79
End: 2019-12-03
Payer: MEDICARE

## 2019-12-03 DIAGNOSIS — N39.41 URGE INCONTINENCE: ICD-10-CM

## 2019-12-03 DIAGNOSIS — R39.15 URGENCY OF URINATION: ICD-10-CM

## 2019-12-03 DIAGNOSIS — R35.0 URINARY FREQUENCY: ICD-10-CM

## 2019-12-03 PROCEDURE — 64566 NEUROELTRD STIM POST TIBIAL: CPT

## 2019-12-03 NOTE — PROGRESS NOTES
12/3/2019    Bernardindlucia Coad  1940  [id number]    Diagnosis  Chief Complaint     Urinary Urgency; Urinary Frequency; Urinary Incontinence         Patient presents for PTNS 9 of 12 managed by Dr Chris Andersen one week for next PTNS tx    Procedure PTNS    History:  Date onset of symptoms: A long time ago  Behavior modification: no  Biofeedback:no  E-Stim:no  Drugs: Myrbetriq didn't work  Other: Several anticholinergics didn't work    Session 9 of 12    Ankle used: right  Treatment setting:3  Duration of treatment: 30 minutes  Lead lot #:[id number]  Expiration Date:03-  Feeling/Response: Toe flex and foot sensation    Patient Goals and Progress  Decreased Urgency Yes  Decreased Frequency Yes  Episodes of incontinence Yes  Sleep Through Night Yes  Related Health and Social Factors No    Bladder Diary Summary:  Caffeine cups per day:0  Alcohol- number of drinks per day:0-1  Daytime voids:4  Nighttime voids:0-2  Urgency:0-1  Incontinence- episodes per day:0    Treatment Plan  Increase Fluids No  Decrease Caffeine Yes  Decrease FluidsYes  Urge reduction techniques Yes  Kegels No  Timed voiding No  No fluids before bed Yes          [employer]

## 2019-12-10 ENCOUNTER — PROCEDURE VISIT (OUTPATIENT)
Dept: UROLOGY | Facility: MEDICAL CENTER | Age: 79
End: 2019-12-10
Payer: MEDICARE

## 2019-12-10 DIAGNOSIS — N39.41 URGE INCONTINENCE: ICD-10-CM

## 2019-12-10 DIAGNOSIS — R39.15 URGENCY OF URINATION: ICD-10-CM

## 2019-12-10 DIAGNOSIS — R35.0 URINARY FREQUENCY: ICD-10-CM

## 2019-12-10 PROCEDURE — 64566 NEUROELTRD STIM POST TIBIAL: CPT

## 2019-12-10 NOTE — PROGRESS NOTES
I supervised the Advanced Practitioner  I reviewed the Advanced Practitioner note and agree      Claudette Neri MD 12/10/19

## 2019-12-10 NOTE — PROGRESS NOTES
12/10/2019    Rudi Tahir  1940  [id number]    Diagnosis  Chief Complaint     Urinary Urgency; Urinary Incontinence; Urinary Frequency         Patient presents for PTNS 10 of 12 managed by Dr Deb Rico one week for next PTNS tx    Procedure PTNS    History:  Date onset of symptoms: A long time ago  Behavior modification: no  Biofeedback:no  E-Stim:no  Drugs:  Myrbetriq didn't work  Other: Several anticholinergics didn't work    Session 10 of 12    Ankle used: right  Treatment settin  Duration of treatment: 30 minutes  Lead lot #:[id number]  Expiration Date:2022  Feeling/Response: Toe flex and foot sensation    Patient Goals and Progress  Decreased Urgency Yes  Decreased Frequency Yes  Episodes of incontinence Yes  Sleep Through Night Yes  Related Health and Social Factors No    Bladder Diary Summary:  Caffeine cups per day:0  Alcohol- number of drinks per day:0-1  Daytime voids:4  Nighttime voids:0-2  Urgency:0-1  Incontinence- episodes per day:0    Treatment Plan  Increase Fluids No  Decrease Caffeine Yes  Decrease FluidsYes  Urge reduction techniques Yes  Kegels No  Timed voiding No  No fluids before bed Yes          [employer]

## 2019-12-19 ENCOUNTER — PROCEDURE VISIT (OUTPATIENT)
Dept: UROLOGY | Facility: MEDICAL CENTER | Age: 79
End: 2019-12-19
Payer: MEDICARE

## 2019-12-19 DIAGNOSIS — R35.0 URINARY FREQUENCY: ICD-10-CM

## 2019-12-19 DIAGNOSIS — N39.41 URGE INCONTINENCE: ICD-10-CM

## 2019-12-19 DIAGNOSIS — R39.15 URGENCY OF URINATION: ICD-10-CM

## 2019-12-19 PROCEDURE — 64566 NEUROELTRD STIM POST TIBIAL: CPT

## 2019-12-19 NOTE — PROGRESS NOTES
2019    Robert Fallon  1940  [id number]    Diagnosis  Chief Complaint     Urinary Incontinence; Urinary Urgency; Urinary Frequency         Patient presents for PTNS  managed by Dr Roxi Gracia one week for next PTNS tx    Procedure PTNS    History:  Date onset of symptoms: A long time ago  Behavior modification: no  Biofeedback:no  E-Stim:no  Drugs:  Myrbetriq didn't work  Other: Several anticholinergics didn't work    Session     Ankle used: right  Treatment settin  Duration of treatment: 30 minutes  Lead lot #:[id number]  Expiration Date:2022  Feeling/Response: Toe flex and foot sensation    Patient Goals and Progress  Decreased Urgency Yes  Decreased Frequency Yes  Episodes of incontinence Yes  Sleep Through Night Yes  Related Health and Social Factors No    Bladder Diary Summary:  Caffeine cups per day:0  Alcohol- number of drinks per day:0-1  Daytime voids:4  Nighttime voids:0-2  Urgency:0-1  Incontinence- episodes per day:0    Treatment Plan  Increase Fluids No  Decrease Caffeine Yes  Decrease FluidsYes  Urge reduction techniques Yes  Kegels No  Timed voiding No  No fluids before bed Yes          [employer]

## 2019-12-31 ENCOUNTER — PROCEDURE VISIT (OUTPATIENT)
Dept: UROLOGY | Facility: MEDICAL CENTER | Age: 79
End: 2019-12-31
Payer: MEDICARE

## 2019-12-31 DIAGNOSIS — R39.15 URGENCY OF URINATION: ICD-10-CM

## 2019-12-31 DIAGNOSIS — R35.0 URINARY FREQUENCY: ICD-10-CM

## 2019-12-31 DIAGNOSIS — N39.41 URGE INCONTINENCE: ICD-10-CM

## 2019-12-31 PROCEDURE — 64566 NEUROELTRD STIM POST TIBIAL: CPT

## 2019-12-31 NOTE — PROGRESS NOTES
2019    Christel Chase  1940  [id number]    Diagnosis  Chief Complaint     Urinary Incontinence; Urinary Urgency; Urinary Frequency        Patient presents for PTNS  managed by Dr Diane Osullivan  F/U with Dr Jud Sicard in office    Procedure PTNS    History:  Date onset of symptoms: A long time ago  Behavior modification: no  Biofeedback:no  E-Stim:no  Drugs:  Myrbetriq didn't work  Other: Several anticholinergics didn't work    Session     Ankle used: right  Treatment settin  Duration of treatment:30 minutes  Lead lot #:[id number]  Expiration Date:2022  Feeling/Response: Toe flex and foot sensation    Patient Goals and Progress  Decreased Urgency Yes  Decreased Frequency Yes  Episodes of incontinence Yes  Sleep Through Night Yes  Related Health and Social Factors Yes    Bladder Diary Summary:  Caffeine cups per day:0  Alcohol- number of drinks per day:1-4  Daytime voids:4  Nighttime voids:0-2  Urgency:0-1  Incontinence- episodes per day:0    Treatment Plan  Increase Fluids No  Decrease Caffeine Yes  Decrease FluidsYes  Urge reduction techniques Yes  Kegels No  Timed voiding No  No fluids before bed Yes    Pt feels PTNS txs didn't work  F/U with Dr Jud Sicard to discuss options        [employer]

## 2020-01-01 NOTE — PROGRESS NOTES
I supervised the Advanced Practitioner  I reviewed the Advanced Practitioner note and agree      Lulú Guzman MD 12/31/19

## 2020-01-02 RX ORDER — DIVALPROEX SODIUM 500 MG/1
500 TABLET, EXTENDED RELEASE ORAL
Refills: 1 | COMMUNITY
Start: 2019-11-08 | End: 2020-12-22 | Stop reason: HOSPADM

## 2020-01-07 ENCOUNTER — OFFICE VISIT (OUTPATIENT)
Dept: UROLOGY | Facility: MEDICAL CENTER | Age: 80
End: 2020-01-07
Payer: MEDICARE

## 2020-01-07 VITALS
SYSTOLIC BLOOD PRESSURE: 118 MMHG | WEIGHT: 128 LBS | HEIGHT: 64 IN | DIASTOLIC BLOOD PRESSURE: 80 MMHG | BODY MASS INDEX: 21.85 KG/M2 | HEART RATE: 80 BPM

## 2020-01-07 DIAGNOSIS — R35.0 FREQUENCY OF URINATION AND POLYURIA: Primary | ICD-10-CM

## 2020-01-07 DIAGNOSIS — R35.89 FREQUENCY OF URINATION AND POLYURIA: Primary | ICD-10-CM

## 2020-01-07 DIAGNOSIS — N39.44 NOCTURNAL ENURESIS: ICD-10-CM

## 2020-01-07 DIAGNOSIS — N39.41 URGE INCONTINENCE: ICD-10-CM

## 2020-01-07 PROCEDURE — 99213 OFFICE O/P EST LOW 20 MIN: CPT | Performed by: UROLOGY

## 2020-01-07 RX ORDER — METOPROLOL SUCCINATE 25 MG/1
25 TABLET, EXTENDED RELEASE ORAL 2 TIMES DAILY
Status: ON HOLD | COMMUNITY
Start: 2019-12-01 | End: 2020-12-22 | Stop reason: SDUPTHER

## 2020-01-07 RX ORDER — CHLORHEXIDINE GLUCONATE 0.12 MG/ML
RINSE ORAL
COMMUNITY
Start: 2020-01-07 | End: 2020-12-22 | Stop reason: HOSPADM

## 2020-01-07 NOTE — PROGRESS NOTES
[address] [facility]  [facility] [address] [facility] [address]  [place], [address] [place] [address]  [phone]  www  [facility]  org      NAME: Robert Fallon  AGE: [de-identified] y o  SEX: female  : 1940   MRN: [id number]    DATE: 2020  TIME: 3:28 PM    Assessment and Plan :    Urge incontinence, urinary urgency and frequency and polyuria due to lithium use- she has failed overactive bladder medications and PTNS  We discussed Botox but there is a risk of urinary retention with this and I do not think the Botox would be successful given the amount of polyuria that she gets from the lithium  Therefore I advised her to continue with the diapers /pads  Chief Complaint   No chief complaint on file  History of Present Illness   Follow-up urinary incontinence, urinary urgency and frequency-has undergone 12 PT NS treatments  She has failed several oral medications in the past Unfortunately, this last induction of PTNS has not helped  She sleeps through the night and she soaks her briefs through the night  During the day she voids in her incontinence briefs  She makes a lot of urine  She is on lithium        The following portions of the patient's history were reviewed and updated as appropriate: allergies, current medications, past family history, past medical history, past social history, past surgical history and problem list   Past Medical History:   Diagnosis Date    B[facility] [address] [facility]     Essential hypertension     Hypothyroidism     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[address] )      Past Surgical History:   Procedure Laterality Date    TONSILLECTOMY      TOTAL HIP ARTHROPLASTY Right      shoulder  Review of Systems   Review of Systems    Active Problem List     Patient Active Problem List   Diagnosis    Bipolar 1 disorder ([address] )    Essential hypertension    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[address]    Hypothyroidism    Bipolar depression ([address]    Hyponatremia    Chronic leg pain  Seasonal allergies    Bilateral dry eyes    Age-related osteoporosis without current pathological fracture       Objective   /80   Pulse 80   Ht 5' 4" (1 626 m)   Wt 58 1 kg (128 lb)   BMI 21 97 kg/m²     Physical Exam   Constitutional: She is oriented to person, place, and time  She appears well-developed and well-nourished  HENT:   Head: Normocephalic and atraumatic  Eyes: EOM are normal    Neck: Normal range of motion  Pulmonary/Chest: Effort normal    Neurological: She is alert and oriented to person, place, and time  Skin: Skin is warm and dry  Psychiatric: She has a normal mood and affect   Her behavior is normal  Judgment and thought content normal            Current Medications     Current Outpatient Medications:     cholecalciferol 1000 units tablet, Take 1 tablet (1,000 Units total) by mouth daily, Disp: 30 tablet, Rfl: 0    divalproex sodium (DEPAKOTE ER) 500 mg 24 hr tablet, Take 500 mg by mouth daily at bedtime, Disp: , Rfl: 1    divalproex sodium (DEPAKOTE) 250 mg EC tablet, Take 1 tablet (250 mg total) by mouth daily, Disp: 30 tablet, Rfl: 0    hydrochlorothiazide (HYDRODIURIL) 25 mg tablet, , Disp: , Rfl:     melatonin 3 mg, Take 1 tablet (3 mg total) by mouth daily at bedtime (Patient taking differently: Take 10 mg by mouth daily at bedtime ), Disp: 30 tablet, Rfl: 0    metoprolol succinate (TOPROL-XL) 25 mg 24 hr tablet, Take 25 mg by mouth 2 (two) times a day, Disp: , Rfl:     temazepam (RESTORIL) 30 mg capsule, , Disp: , Rfl:     acetaminophen (TYLENOL) 500 mg tablet, Take 500 mg by mouth every 6 (six) hours as needed, Disp: , Rfl:     chlorhexidine (PERIDEX) 0 12 % solution, , Disp: , Rfl:     fexofenadine (ALLEGRA) 180 MG tablet, Take 1 tablet (180 mg total) by mouth daily, Disp: 30 tablet, Rfl: 0    folic acid (FOLVITE) 1 mg tablet, Take 1 tablet (1 mg total) by mouth daily (Patient not taking: Reported on 10/18/2019), Disp: 30 tablet, Rfl: 0    ILEVRO 0 3 % SUSP, , Disp: , Rfl:     levothyroxine 25 mcg tablet, Take 1 tablet (25 mcg total) by mouth every other day, Disp: 15 tablet, Rfl: 0    levothyroxine 50 mcg tablet, Take 1 tablet (50 mcg total) by mouth every other day, Disp: 15 tablet, Rfl: 0    lithium carbonate 300 mg capsule, , Disp: , Rfl:     LORazepam (ATIVAN) 0 5 mg tablet, Take 0 5 mg by mouth every 8 (eight) hours as needed, Disp: , Rfl:         Prasanna Crump MD

## 2020-01-14 DIAGNOSIS — Z12.39 BREAST CANCER SCREENING: ICD-10-CM

## 2020-03-02 ENCOUNTER — TELEPHONE (OUTPATIENT)
Dept: UROLOGY | Facility: AMBULATORY SURGERY CENTER | Age: 80
End: 2020-03-02

## 2020-03-02 DIAGNOSIS — R35.0 URINARY FREQUENCY: Primary | ICD-10-CM

## 2020-03-02 NOTE — TELEPHONE ENCOUNTER
Spoke with pt who c/o frequent urination  Denies fever or chills  Pt would like to submit urine sample for u/c   Order sent to [facility] for that at her request

## 2020-03-02 NOTE — TELEPHONE ENCOUNTER
Patient managed by Rain Richards is calling say he is having urinary frequency and is concerned with abnormal labs  She would like to have urine culture she goes to [facility]  Please call back

## 2020-12-01 ENCOUNTER — HOSPITAL ENCOUNTER (INPATIENT)
Facility: HOSPITAL | Age: 80
LOS: 1 days | DRG: 683 | End: 2020-12-03
Attending: EMERGENCY MEDICINE | Admitting: INTERNAL MEDICINE
Payer: MEDICARE

## 2020-12-01 DIAGNOSIS — N17.9 AKI (ACUTE KIDNEY INJURY) (HCC): ICD-10-CM

## 2020-12-01 DIAGNOSIS — F31.9 BIPOLAR 1 DISORDER (HCC): Primary | ICD-10-CM

## 2020-12-01 DIAGNOSIS — F03.90 DEMENTIA (HCC): ICD-10-CM

## 2020-12-01 DIAGNOSIS — Z79.899 MEDICATION MANAGEMENT: ICD-10-CM

## 2020-12-01 DIAGNOSIS — F31.9 BIPOLAR DEPRESSION (HCC): ICD-10-CM

## 2020-12-01 DIAGNOSIS — Z00.8 ENCOUNTER FOR PSYCHOLOGICAL EVALUATION: ICD-10-CM

## 2020-12-01 PROBLEM — Z86.19 HISTORY OF CLOSTRIDIUM DIFFICILE INFECTION: Status: ACTIVE | Noted: 2018-01-06

## 2020-12-01 PROBLEM — R14.0 ABDOMINAL DISTENSION: Status: ACTIVE | Noted: 2020-12-01

## 2020-12-01 PROBLEM — F41.9 ANXIETY DISORDER: Status: ACTIVE | Noted: 2017-11-08

## 2020-12-01 PROBLEM — R26.9 GAIT ABNORMALITY: Status: ACTIVE | Noted: 2019-02-10

## 2020-12-01 PROBLEM — T56.891A LITHIUM TOXICITY: Status: ACTIVE | Noted: 2020-12-01

## 2020-12-01 LAB
ALBUMIN SERPL BCP-MCNC: 4 G/DL (ref 3–5.2)
ALP SERPL-CCNC: 52 U/L (ref 43–122)
ALT SERPL W P-5'-P-CCNC: 18 U/L (ref 9–52)
ANION GAP SERPL CALCULATED.3IONS-SCNC: 5 MMOL/L (ref 5–14)
AST SERPL W P-5'-P-CCNC: 47 U/L (ref 14–36)
BASOPHILS # BLD AUTO: 0 THOUSANDS/ΜL (ref 0–0.1)
BASOPHILS NFR BLD AUTO: 0 % (ref 0–1)
BILIRUB SERPL-MCNC: 0.5 MG/DL
BUN SERPL-MCNC: 42 MG/DL (ref 5–25)
CALCIUM SERPL-MCNC: 10.1 MG/DL (ref 8.4–10.2)
CHLORIDE SERPL-SCNC: 103 MMOL/L (ref 97–108)
CO2 SERPL-SCNC: 29 MMOL/L (ref 22–30)
CREAT SERPL-MCNC: 1.62 MG/DL (ref 0.6–1.2)
EOSINOPHIL # BLD AUTO: 0 THOUSAND/ΜL (ref 0–0.4)
EOSINOPHIL NFR BLD AUTO: 0 % (ref 0–6)
ERYTHROCYTE [DISTWIDTH] IN BLOOD BY AUTOMATED COUNT: 14.4 %
ETHANOL SERPL-MCNC: <10 MG/DL (ref 0–10)
FLUAV RNA NPH QL NAA+PROBE: NORMAL
FLUBV RNA NPH QL NAA+PROBE: NORMAL
GFR SERPL CREATININE-BSD FRML MDRD: 30 ML/MIN/1.73SQ M
GLUCOSE SERPL-MCNC: 84 MG/DL (ref 70–99)
HCT VFR BLD AUTO: 35.8 % (ref 36–46)
HGB BLD-MCNC: 11.9 G/DL (ref 12–16)
LITHIUM SERPL-SCNC: 1.3 MMOL/L (ref 0.6–1.2)
LYMPHOCYTES # BLD AUTO: 1.9 THOUSANDS/ΜL (ref 0.5–4)
LYMPHOCYTES NFR BLD AUTO: 21 % (ref 25–45)
MCH RBC QN AUTO: 33.1 PG (ref 26–34)
MCHC RBC AUTO-ENTMCNC: 33.2 G/DL (ref 31–36)
MCV RBC AUTO: 100 FL (ref 80–100)
MONOCYTES # BLD AUTO: 1.1 THOUSAND/ΜL (ref 0.2–0.9)
MONOCYTES NFR BLD AUTO: 12 % (ref 1–10)
NEUTROPHILS # BLD AUTO: 6.2 THOUSANDS/ΜL (ref 1.8–7.8)
NEUTS SEG NFR BLD AUTO: 67 % (ref 45–65)
PLATELET # BLD AUTO: 350 THOUSANDS/UL (ref 150–450)
PMV BLD AUTO: 8.9 FL (ref 8.9–12.7)
POTASSIUM SERPL-SCNC: 4.1 MMOL/L (ref 3.6–5)
PROT SERPL-MCNC: 7.3 G/DL (ref 5.9–8.4)
RBC # BLD AUTO: 3.59 MILLION/UL (ref 4–5.2)
RSV RNA NPH QL NAA+PROBE: NORMAL
SODIUM SERPL-SCNC: 137 MMOL/L (ref 137–147)
TSH SERPL DL<=0.05 MIU/L-ACNC: 1.18 UIU/ML (ref 0.47–4.68)
VALPROATE SERPL-MCNC: 68.5 UG/ML (ref 50–120)
WBC # BLD AUTO: 9.2 THOUSAND/UL (ref 4.5–11)

## 2020-12-01 PROCEDURE — 87631 RESP VIRUS 3-5 TARGETS: CPT | Performed by: EMERGENCY MEDICINE

## 2020-12-01 PROCEDURE — 99285 EMERGENCY DEPT VISIT HI MDM: CPT | Performed by: EMERGENCY MEDICINE

## 2020-12-01 PROCEDURE — 80178 ASSAY OF LITHIUM: CPT | Performed by: EMERGENCY MEDICINE

## 2020-12-01 PROCEDURE — U0003 INFECTIOUS AGENT DETECTION BY NUCLEIC ACID (DNA OR RNA); SEVERE ACUTE RESPIRATORY SYNDROME CORONAVIRUS 2 (SARS-COV-2) (CORONAVIRUS DISEASE [COVID-19]), AMPLIFIED PROBE TECHNIQUE, MAKING USE OF HIGH THROUGHPUT TECHNOLOGIES AS DESCRIBED BY CMS-2020-01-R: HCPCS | Performed by: EMERGENCY MEDICINE

## 2020-12-01 PROCEDURE — 84443 ASSAY THYROID STIM HORMONE: CPT | Performed by: EMERGENCY MEDICINE

## 2020-12-01 PROCEDURE — 80164 ASSAY DIPROPYLACETIC ACD TOT: CPT | Performed by: EMERGENCY MEDICINE

## 2020-12-01 PROCEDURE — 99285 EMERGENCY DEPT VISIT HI MDM: CPT

## 2020-12-01 PROCEDURE — 93005 ELECTROCARDIOGRAM TRACING: CPT

## 2020-12-01 PROCEDURE — 80320 DRUG SCREEN QUANTALCOHOLS: CPT | Performed by: EMERGENCY MEDICINE

## 2020-12-01 PROCEDURE — 99219 PR INITIAL OBSERVATION CARE/DAY 50 MINUTES: CPT | Performed by: PHYSICIAN ASSISTANT

## 2020-12-01 PROCEDURE — 36415 COLL VENOUS BLD VENIPUNCTURE: CPT | Performed by: EMERGENCY MEDICINE

## 2020-12-01 PROCEDURE — 85025 COMPLETE CBC W/AUTO DIFF WBC: CPT | Performed by: EMERGENCY MEDICINE

## 2020-12-01 PROCEDURE — 80053 COMPREHEN METABOLIC PANEL: CPT | Performed by: EMERGENCY MEDICINE

## 2020-12-01 PROCEDURE — 1124F ACP DISCUSS-NO DSCNMKR DOCD: CPT | Performed by: EMERGENCY MEDICINE

## 2020-12-01 PROCEDURE — 96360 HYDRATION IV INFUSION INIT: CPT

## 2020-12-01 RX ORDER — METOPROLOL SUCCINATE 25 MG/1
25 TABLET, EXTENDED RELEASE ORAL DAILY
Status: DISCONTINUED | OUTPATIENT
Start: 2020-12-02 | End: 2020-12-01

## 2020-12-01 RX ORDER — LEVOTHYROXINE SODIUM 0.05 MG/1
50 TABLET ORAL EVERY OTHER DAY
Status: DISCONTINUED | OUTPATIENT
Start: 2020-12-02 | End: 2020-12-03 | Stop reason: HOSPADM

## 2020-12-01 RX ORDER — ACETAMINOPHEN 325 MG/1
650 TABLET ORAL EVERY 6 HOURS PRN
Status: DISCONTINUED | OUTPATIENT
Start: 2020-12-01 | End: 2020-12-03 | Stop reason: HOSPADM

## 2020-12-01 RX ORDER — ONDANSETRON 2 MG/ML
4 INJECTION INTRAMUSCULAR; INTRAVENOUS EVERY 6 HOURS PRN
Status: DISCONTINUED | OUTPATIENT
Start: 2020-12-01 | End: 2020-12-03 | Stop reason: HOSPADM

## 2020-12-01 RX ORDER — ACETAMINOPHEN 325 MG/1
650 TABLET ORAL EVERY 6 HOURS PRN
Status: DISCONTINUED | OUTPATIENT
Start: 2020-12-01 | End: 2020-12-01

## 2020-12-01 RX ORDER — METOPROLOL SUCCINATE 25 MG/1
25 TABLET, EXTENDED RELEASE ORAL 2 TIMES DAILY
Status: DISCONTINUED | OUTPATIENT
Start: 2020-12-02 | End: 2020-12-03 | Stop reason: HOSPADM

## 2020-12-01 RX ORDER — LORAZEPAM 0.5 MG/1
0.5 TABLET ORAL EVERY 8 HOURS PRN
Status: DISCONTINUED | OUTPATIENT
Start: 2020-12-01 | End: 2020-12-03 | Stop reason: HOSPADM

## 2020-12-01 RX ORDER — LITHIUM CARBONATE 300 MG/1
300 CAPSULE ORAL DAILY
Status: DISCONTINUED | OUTPATIENT
Start: 2020-12-02 | End: 2020-12-01

## 2020-12-01 RX ORDER — LEVOTHYROXINE SODIUM 0.05 MG/1
50 TABLET ORAL
Status: DISCONTINUED | OUTPATIENT
Start: 2020-12-02 | End: 2020-12-01

## 2020-12-01 RX ORDER — DIVALPROEX SODIUM 125 MG/1
500 CAPSULE, COATED PELLETS ORAL
Status: DISCONTINUED | OUTPATIENT
Start: 2020-12-01 | End: 2020-12-01

## 2020-12-01 RX ORDER — TEMAZEPAM 15 MG/1
15 CAPSULE ORAL
Status: DISCONTINUED | OUTPATIENT
Start: 2020-12-01 | End: 2020-12-01

## 2020-12-01 RX ORDER — DIVALPROEX SODIUM 250 MG/1
250 TABLET, DELAYED RELEASE ORAL DAILY
Status: DISCONTINUED | OUTPATIENT
Start: 2020-12-02 | End: 2020-12-03 | Stop reason: HOSPADM

## 2020-12-01 RX ORDER — SODIUM CHLORIDE 9 MG/ML
125 INJECTION, SOLUTION INTRAVENOUS CONTINUOUS
Status: DISCONTINUED | OUTPATIENT
Start: 2020-12-01 | End: 2020-12-02

## 2020-12-01 RX ORDER — DIVALPROEX SODIUM 500 MG/1
500 TABLET, EXTENDED RELEASE ORAL
Status: DISCONTINUED | OUTPATIENT
Start: 2020-12-01 | End: 2020-12-03 | Stop reason: HOSPADM

## 2020-12-01 RX ORDER — OLANZAPINE 5 MG/1
5 TABLET, ORALLY DISINTEGRATING ORAL ONCE
Status: COMPLETED | OUTPATIENT
Start: 2020-12-01 | End: 2020-12-01

## 2020-12-01 RX ORDER — HEPARIN SODIUM 5000 [USP'U]/ML
5000 INJECTION, SOLUTION INTRAVENOUS; SUBCUTANEOUS EVERY 12 HOURS SCHEDULED
Status: DISCONTINUED | OUTPATIENT
Start: 2020-12-01 | End: 2020-12-03 | Stop reason: HOSPADM

## 2020-12-01 RX ORDER — LEVOTHYROXINE SODIUM 0.03 MG/1
25 TABLET ORAL EVERY OTHER DAY
Status: DISCONTINUED | OUTPATIENT
Start: 2020-12-02 | End: 2020-12-03 | Stop reason: HOSPADM

## 2020-12-01 RX ORDER — MELATONIN
1000 DAILY
Status: DISCONTINUED | OUTPATIENT
Start: 2020-12-02 | End: 2020-12-03 | Stop reason: HOSPADM

## 2020-12-01 RX ORDER — TEMAZEPAM 15 MG/1
30 CAPSULE ORAL
Status: DISCONTINUED | OUTPATIENT
Start: 2020-12-01 | End: 2020-12-03 | Stop reason: HOSPADM

## 2020-12-01 RX ORDER — LORATADINE 10 MG/1
10 TABLET ORAL DAILY
Status: DISCONTINUED | OUTPATIENT
Start: 2020-12-02 | End: 2020-12-03 | Stop reason: HOSPADM

## 2020-12-01 RX ORDER — DIVALPROEX SODIUM 125 MG/1
250 CAPSULE, COATED PELLETS ORAL DAILY
Status: DISCONTINUED | OUTPATIENT
Start: 2020-12-02 | End: 2020-12-01

## 2020-12-01 RX ADMIN — OLANZAPINE 5 MG: 5 TABLET, ORALLY DISINTEGRATING ORAL at 17:40

## 2020-12-01 RX ADMIN — SODIUM CHLORIDE 1000 ML: 0.9 INJECTION, SOLUTION INTRAVENOUS at 18:07

## 2020-12-01 RX ADMIN — SODIUM CHLORIDE 125 ML/HR: 0.9 INJECTION, SOLUTION INTRAVENOUS at 22:40

## 2020-12-02 ENCOUNTER — APPOINTMENT (OUTPATIENT)
Dept: RADIOLOGY | Facility: HOSPITAL | Age: 80
DRG: 683 | End: 2020-12-02
Payer: MEDICARE

## 2020-12-02 LAB
AMPHETAMINES SERPL QL SCN: NEGATIVE
ANION GAP SERPL CALCULATED.3IONS-SCNC: 4 MMOL/L (ref 5–14)
BARBITURATES UR QL: NEGATIVE
BENZODIAZ UR QL: NEGATIVE
BILIRUB UR QL STRIP: NEGATIVE
BUN SERPL-MCNC: 30 MG/DL (ref 5–25)
CALCIUM SERPL-MCNC: 8.6 MG/DL (ref 8.4–10.2)
CHLORIDE SERPL-SCNC: 112 MMOL/L (ref 97–108)
CLARITY UR: ABNORMAL
CO2 SERPL-SCNC: 25 MMOL/L (ref 22–30)
COCAINE UR QL: NEGATIVE
COLOR UR: ABNORMAL
CREAT SERPL-MCNC: 0.98 MG/DL (ref 0.6–1.2)
ERYTHROCYTE [DISTWIDTH] IN BLOOD BY AUTOMATED COUNT: 14.5 %
GFR SERPL CREATININE-BSD FRML MDRD: 54 ML/MIN/1.73SQ M
GLUCOSE SERPL-MCNC: 84 MG/DL (ref 70–99)
GLUCOSE UR STRIP-MCNC: NEGATIVE MG/DL
HCT VFR BLD AUTO: 33 % (ref 36–46)
HGB BLD-MCNC: 10.9 G/DL (ref 12–16)
HGB UR QL STRIP.AUTO: NEGATIVE
KETONES UR STRIP-MCNC: NEGATIVE MG/DL
LEUKOCYTE ESTERASE UR QL STRIP: NEGATIVE
LITHIUM SERPL-SCNC: 1.3 MMOL/L (ref 0.6–1.2)
MCH RBC QN AUTO: 32.8 PG (ref 26–34)
MCHC RBC AUTO-ENTMCNC: 33 G/DL (ref 31–36)
MCV RBC AUTO: 99 FL (ref 80–100)
METHADONE UR QL: NEGATIVE
NITRITE UR QL STRIP: NEGATIVE
OPIATES UR QL SCN: NEGATIVE
OXYCODONE+OXYMORPHONE UR QL SCN: NEGATIVE
PCP UR QL: NEGATIVE
PH UR STRIP.AUTO: 6 [PH]
PLATELET # BLD AUTO: 292 THOUSANDS/UL (ref 150–450)
PMV BLD AUTO: 10.1 FL (ref 8.9–12.7)
POTASSIUM SERPL-SCNC: 3.5 MMOL/L (ref 3.6–5)
PROT UR STRIP-MCNC: NEGATIVE MG/DL
RBC # BLD AUTO: 3.32 MILLION/UL (ref 4–5.2)
SARS-COV-2 N GENE RESP QL NAA+PROBE: NEGATIVE
SODIUM SERPL-SCNC: 141 MMOL/L (ref 137–147)
SP GR UR STRIP.AUTO: 1.01 (ref 1–1.04)
THC UR QL: NEGATIVE
UROBILINOGEN UA: NEGATIVE MG/DL
WBC # BLD AUTO: 7 THOUSAND/UL (ref 4.5–11)

## 2020-12-02 PROCEDURE — 85027 COMPLETE CBC AUTOMATED: CPT | Performed by: PHYSICIAN ASSISTANT

## 2020-12-02 PROCEDURE — 81003 URINALYSIS AUTO W/O SCOPE: CPT | Performed by: EMERGENCY MEDICINE

## 2020-12-02 PROCEDURE — NC001 PR NO CHARGE: Performed by: PSYCHIATRY & NEUROLOGY

## 2020-12-02 PROCEDURE — 80307 DRUG TEST PRSMV CHEM ANLYZR: CPT | Performed by: EMERGENCY MEDICINE

## 2020-12-02 PROCEDURE — 80178 ASSAY OF LITHIUM: CPT | Performed by: PHYSICIAN ASSISTANT

## 2020-12-02 PROCEDURE — 74022 RADEX COMPL AQT ABD SERIES: CPT

## 2020-12-02 PROCEDURE — 80048 BASIC METABOLIC PNL TOTAL CA: CPT | Performed by: PHYSICIAN ASSISTANT

## 2020-12-02 RX ORDER — POTASSIUM CHLORIDE 14.9 MG/ML
20 INJECTION INTRAVENOUS ONCE
Status: DISCONTINUED | OUTPATIENT
Start: 2020-12-02 | End: 2020-12-03 | Stop reason: HOSPADM

## 2020-12-02 RX ORDER — POTASSIUM CHLORIDE 20MEQ/15ML
40 LIQUID (ML) ORAL ONCE
Status: DISCONTINUED | OUTPATIENT
Start: 2020-12-02 | End: 2020-12-02

## 2020-12-02 RX ORDER — POLYETHYLENE GLYCOL 3350 17 G/17G
17 POWDER, FOR SOLUTION ORAL DAILY
Status: DISCONTINUED | OUTPATIENT
Start: 2020-12-02 | End: 2020-12-03 | Stop reason: HOSPADM

## 2020-12-02 RX ORDER — POTASSIUM CHLORIDE 14.9 MG/ML
20 INJECTION INTRAVENOUS ONCE
Status: COMPLETED | OUTPATIENT
Start: 2020-12-02 | End: 2020-12-02

## 2020-12-02 RX ORDER — AMOXICILLIN 250 MG
1 CAPSULE ORAL
Status: DISCONTINUED | OUTPATIENT
Start: 2020-12-02 | End: 2020-12-03 | Stop reason: HOSPADM

## 2020-12-02 RX ORDER — OLANZAPINE 2.5 MG/1
2.5 TABLET ORAL 2 TIMES DAILY
Status: DISCONTINUED | OUTPATIENT
Start: 2020-12-02 | End: 2020-12-03 | Stop reason: HOSPADM

## 2020-12-02 RX ORDER — POTASSIUM CHLORIDE 14.9 MG/ML
20 INJECTION INTRAVENOUS ONCE
Status: DISCONTINUED | OUTPATIENT
Start: 2020-12-02 | End: 2020-12-02 | Stop reason: SDUPTHER

## 2020-12-02 RX ADMIN — POTASSIUM CHLORIDE 20 MEQ: 14.9 INJECTION, SOLUTION INTRAVENOUS at 16:19

## 2020-12-02 RX ADMIN — DIVALPROEX SODIUM 250 MG: 250 TABLET, DELAYED RELEASE ORAL at 08:47

## 2020-12-02 RX ADMIN — HEPARIN SODIUM 5000 UNITS: 5000 INJECTION INTRAVENOUS; SUBCUTANEOUS at 08:47

## 2020-12-02 RX ADMIN — POTASSIUM CHLORIDE 20 MEQ: 14.9 INJECTION, SOLUTION INTRAVENOUS at 14:05

## 2020-12-02 RX ADMIN — METOPROLOL SUCCINATE 25 MG: 25 TABLET, EXTENDED RELEASE ORAL at 22:00

## 2020-12-02 RX ADMIN — TEMAZEPAM 30 MG: 15 CAPSULE ORAL at 00:12

## 2020-12-02 RX ADMIN — DIVALPROEX SODIUM 500 MG: 500 TABLET, EXTENDED RELEASE ORAL at 22:08

## 2020-12-02 RX ADMIN — OLANZAPINE 2.5 MG: 2.5 TABLET, FILM COATED ORAL at 11:01

## 2020-12-02 RX ADMIN — METOPROLOL SUCCINATE 25 MG: 25 TABLET, EXTENDED RELEASE ORAL at 08:46

## 2020-12-02 RX ADMIN — DOCUSATE SODIUM AND SENNOSIDES 1 TABLET: 8.6; 5 TABLET, FILM COATED ORAL at 22:08

## 2020-12-02 RX ADMIN — TEMAZEPAM 30 MG: 15 CAPSULE ORAL at 22:08

## 2020-12-02 RX ADMIN — CHOLECALCIFEROL TAB 25 MCG (1000 UNIT) 1000 UNITS: 25 TAB at 08:47

## 2020-12-02 RX ADMIN — LORATADINE 10 MG: 10 TABLET ORAL at 08:47

## 2020-12-02 RX ADMIN — SODIUM CHLORIDE 125 ML/HR: 0.9 INJECTION, SOLUTION INTRAVENOUS at 08:46

## 2020-12-02 RX ADMIN — OLANZAPINE 2.5 MG: 2.5 TABLET, FILM COATED ORAL at 22:00

## 2020-12-02 RX ADMIN — POLYETHYLENE GLYCOL (3350) 17 G: 17 POWDER, FOR SOLUTION ORAL at 11:01

## 2020-12-02 RX ADMIN — DIVALPROEX SODIUM 500 MG: 500 TABLET, EXTENDED RELEASE ORAL at 00:12

## 2020-12-03 ENCOUNTER — HOSPITAL ENCOUNTER (INPATIENT)
Facility: HOSPITAL | Age: 80
LOS: 18 days | Discharge: HOME | DRG: 885 | End: 2020-12-22
Attending: PSYCHIATRY & NEUROLOGY | Admitting: PSYCHIATRY & NEUROLOGY
Payer: MEDICARE

## 2020-12-03 VITALS
WEIGHT: 114.42 LBS | HEART RATE: 71 BPM | BODY MASS INDEX: 19.53 KG/M2 | SYSTOLIC BLOOD PRESSURE: 170 MMHG | DIASTOLIC BLOOD PRESSURE: 84 MMHG | TEMPERATURE: 97.4 F | HEIGHT: 64 IN | OXYGEN SATURATION: 97 % | RESPIRATION RATE: 18 BRPM

## 2020-12-03 DIAGNOSIS — Z79.899 MEDICATION MANAGEMENT: ICD-10-CM

## 2020-12-03 DIAGNOSIS — M79.604 CHRONIC PAIN OF BOTH LOWER EXTREMITIES: ICD-10-CM

## 2020-12-03 DIAGNOSIS — E55.9 VITAMIN D DEFICIENCY: ICD-10-CM

## 2020-12-03 DIAGNOSIS — E03.9 HYPOTHYROIDISM: ICD-10-CM

## 2020-12-03 DIAGNOSIS — M79.605 CHRONIC PAIN OF BOTH LOWER EXTREMITIES: ICD-10-CM

## 2020-12-03 DIAGNOSIS — R26.9 GAIT ABNORMALITY: ICD-10-CM

## 2020-12-03 DIAGNOSIS — K59.00 CONSTIPATED: ICD-10-CM

## 2020-12-03 DIAGNOSIS — J30.2 SEASONAL ALLERGIES: ICD-10-CM

## 2020-12-03 DIAGNOSIS — I10 ESSENTIAL HYPERTENSION: ICD-10-CM

## 2020-12-03 DIAGNOSIS — F31.9 BIPOLAR 1 DISORDER (HCC): ICD-10-CM

## 2020-12-03 DIAGNOSIS — G89.29 CHRONIC PAIN OF BOTH LOWER EXTREMITIES: ICD-10-CM

## 2020-12-03 DIAGNOSIS — L60.0 ONYCHOCRYPTOSIS: Primary | ICD-10-CM

## 2020-12-03 LAB
ANION GAP SERPL CALCULATED.3IONS-SCNC: 2 MMOL/L (ref 5–14)
ATRIAL RATE: 75 BPM
BUN SERPL-MCNC: 15 MG/DL (ref 5–25)
CALCIUM SERPL-MCNC: 9.4 MG/DL (ref 8.4–10.2)
CHLORIDE SERPL-SCNC: 109 MMOL/L (ref 97–108)
CO2 SERPL-SCNC: 31 MMOL/L (ref 22–30)
CREAT SERPL-MCNC: 0.8 MG/DL (ref 0.6–1.2)
GFR SERPL CREATININE-BSD FRML MDRD: 69 ML/MIN/1.73SQ M
GLUCOSE SERPL-MCNC: 100 MG/DL (ref 70–99)
P AXIS: 80 DEGREES
POTASSIUM SERPL-SCNC: 4.8 MMOL/L (ref 3.6–5)
PR INTERVAL: 154 MS
QRS AXIS: 21 DEGREES
QRSD INTERVAL: 80 MS
QT INTERVAL: 416 MS
QTC INTERVAL: 464 MS
SODIUM SERPL-SCNC: 142 MMOL/L (ref 137–147)
T WAVE AXIS: 31 DEGREES
VENTRICULAR RATE: 75 BPM

## 2020-12-03 PROCEDURE — RECHECK: Performed by: INTERNAL MEDICINE

## 2020-12-03 PROCEDURE — 93010 ELECTROCARDIOGRAM REPORT: CPT

## 2020-12-03 PROCEDURE — 99239 HOSP IP/OBS DSCHRG MGMT >30: CPT | Performed by: INTERNAL MEDICINE

## 2020-12-03 PROCEDURE — 80048 BASIC METABOLIC PNL TOTAL CA: CPT | Performed by: INTERNAL MEDICINE

## 2020-12-03 RX ORDER — TEMAZEPAM 15 MG/1
30 CAPSULE ORAL
Status: CANCELLED | OUTPATIENT
Start: 2020-12-03

## 2020-12-03 RX ORDER — DIVALPROEX SODIUM 500 MG/1
500 TABLET, EXTENDED RELEASE ORAL
Status: DISCONTINUED | OUTPATIENT
Start: 2020-12-03 | End: 2020-12-05

## 2020-12-03 RX ORDER — LORAZEPAM 0.5 MG/1
0.5 TABLET ORAL EVERY 8 HOURS PRN
Status: CANCELLED | OUTPATIENT
Start: 2020-12-03

## 2020-12-03 RX ORDER — LEVOTHYROXINE SODIUM 0.03 MG/1
25 TABLET ORAL EVERY OTHER DAY
Status: CANCELLED | OUTPATIENT
Start: 2020-12-04

## 2020-12-03 RX ORDER — DIVALPROEX SODIUM 250 MG/1
250 TABLET, DELAYED RELEASE ORAL DAILY
Status: CANCELLED | OUTPATIENT
Start: 2020-12-04

## 2020-12-03 RX ORDER — OLANZAPINE 2.5 MG/1
2.5 TABLET ORAL 2 TIMES DAILY
Status: CANCELLED | OUTPATIENT
Start: 2020-12-03

## 2020-12-03 RX ORDER — DIVALPROEX SODIUM 250 MG/1
250 TABLET, DELAYED RELEASE ORAL DAILY
Status: DISCONTINUED | OUTPATIENT
Start: 2020-12-04 | End: 2020-12-04

## 2020-12-03 RX ORDER — POLYETHYLENE GLYCOL 3350 17 G/17G
17 POWDER, FOR SOLUTION ORAL DAILY
Status: DISCONTINUED | OUTPATIENT
Start: 2020-12-04 | End: 2020-12-22 | Stop reason: HOSPADM

## 2020-12-03 RX ORDER — AMOXICILLIN 250 MG
1 CAPSULE ORAL
Status: DISCONTINUED | OUTPATIENT
Start: 2020-12-03 | End: 2020-12-22 | Stop reason: HOSPADM

## 2020-12-03 RX ORDER — LORATADINE 10 MG/1
10 TABLET ORAL DAILY
Status: DISCONTINUED | OUTPATIENT
Start: 2020-12-04 | End: 2020-12-22 | Stop reason: HOSPADM

## 2020-12-03 RX ORDER — TEMAZEPAM 15 MG/1
30 CAPSULE ORAL
Status: DISCONTINUED | OUTPATIENT
Start: 2020-12-03 | End: 2020-12-07

## 2020-12-03 RX ORDER — MELATONIN
1000 DAILY
Status: DISCONTINUED | OUTPATIENT
Start: 2020-12-04 | End: 2020-12-22 | Stop reason: HOSPADM

## 2020-12-03 RX ORDER — ACETAMINOPHEN 325 MG/1
650 TABLET ORAL EVERY 6 HOURS PRN
Status: DISCONTINUED | OUTPATIENT
Start: 2020-12-03 | End: 2020-12-22 | Stop reason: HOSPADM

## 2020-12-03 RX ORDER — LORATADINE 10 MG/1
10 TABLET ORAL DAILY
Status: CANCELLED | OUTPATIENT
Start: 2020-12-04

## 2020-12-03 RX ORDER — LORAZEPAM 0.5 MG/1
0.5 TABLET ORAL EVERY 8 HOURS PRN
Status: DISCONTINUED | OUTPATIENT
Start: 2020-12-03 | End: 2020-12-22 | Stop reason: HOSPADM

## 2020-12-03 RX ORDER — LEVOTHYROXINE SODIUM 0.05 MG/1
50 TABLET ORAL EVERY OTHER DAY
Status: CANCELLED | OUTPATIENT
Start: 2020-12-04

## 2020-12-03 RX ORDER — METOPROLOL SUCCINATE 25 MG/1
25 TABLET, EXTENDED RELEASE ORAL 2 TIMES DAILY
Status: DISCONTINUED | OUTPATIENT
Start: 2020-12-03 | End: 2020-12-22 | Stop reason: HOSPADM

## 2020-12-03 RX ORDER — POLYETHYLENE GLYCOL 3350 17 G/17G
17 POWDER, FOR SOLUTION ORAL DAILY
Status: CANCELLED | OUTPATIENT
Start: 2020-12-04

## 2020-12-03 RX ORDER — LEVOTHYROXINE SODIUM 0.05 MG/1
50 TABLET ORAL EVERY OTHER DAY
Status: DISCONTINUED | OUTPATIENT
Start: 2020-12-06 | End: 2020-12-22 | Stop reason: HOSPADM

## 2020-12-03 RX ORDER — LEVOTHYROXINE SODIUM 0.03 MG/1
25 TABLET ORAL EVERY OTHER DAY
Status: DISCONTINUED | OUTPATIENT
Start: 2020-12-04 | End: 2020-12-22 | Stop reason: HOSPADM

## 2020-12-03 RX ORDER — METOPROLOL SUCCINATE 25 MG/1
25 TABLET, EXTENDED RELEASE ORAL 2 TIMES DAILY
Status: CANCELLED | OUTPATIENT
Start: 2020-12-03

## 2020-12-03 RX ORDER — MELATONIN
1000 DAILY
Status: CANCELLED | OUTPATIENT
Start: 2020-12-04

## 2020-12-03 RX ORDER — AMOXICILLIN 250 MG
1 CAPSULE ORAL
Status: CANCELLED | OUTPATIENT
Start: 2020-12-03

## 2020-12-03 RX ORDER — ACETAMINOPHEN 325 MG/1
650 TABLET ORAL EVERY 6 HOURS PRN
Status: CANCELLED | OUTPATIENT
Start: 2020-12-03

## 2020-12-03 RX ORDER — DIVALPROEX SODIUM 500 MG/1
500 TABLET, EXTENDED RELEASE ORAL
Status: CANCELLED | OUTPATIENT
Start: 2020-12-03

## 2020-12-03 RX ORDER — OLANZAPINE 2.5 MG/1
2.5 TABLET ORAL 2 TIMES DAILY
Status: DISCONTINUED | OUTPATIENT
Start: 2020-12-03 | End: 2020-12-07

## 2020-12-03 RX ADMIN — HEPARIN SODIUM 5000 UNITS: 5000 INJECTION INTRAVENOUS; SUBCUTANEOUS at 08:53

## 2020-12-03 RX ADMIN — OLANZAPINE 2.5 MG: 2.5 TABLET, FILM COATED ORAL at 21:41

## 2020-12-03 RX ADMIN — SENNOSIDES AND DOCUSATE SODIUM 1 TABLET: 8.6; 5 TABLET ORAL at 21:41

## 2020-12-03 RX ADMIN — DIVALPROEX SODIUM 250 MG: 250 TABLET, DELAYED RELEASE ORAL at 08:47

## 2020-12-03 RX ADMIN — TEMAZEPAM 30 MG: 15 CAPSULE ORAL at 21:41

## 2020-12-03 RX ADMIN — METOPROLOL SUCCINATE 25 MG: 25 TABLET, EXTENDED RELEASE ORAL at 08:47

## 2020-12-03 RX ADMIN — POLYETHYLENE GLYCOL (3350) 17 G: 17 POWDER, FOR SOLUTION ORAL at 08:46

## 2020-12-03 RX ADMIN — Medication 10 MG: at 21:40

## 2020-12-03 RX ADMIN — OLANZAPINE 2.5 MG: 2.5 TABLET, FILM COATED ORAL at 08:47

## 2020-12-03 RX ADMIN — LORATADINE 10 MG: 10 TABLET ORAL at 08:47

## 2020-12-03 RX ADMIN — METOPROLOL SUCCINATE 25 MG: 25 TABLET, EXTENDED RELEASE ORAL at 21:41

## 2020-12-03 RX ADMIN — DIVALPROEX SODIUM 500 MG: 500 TABLET, EXTENDED RELEASE ORAL at 21:41

## 2020-12-03 RX ADMIN — CHOLECALCIFEROL TAB 25 MCG (1000 UNIT) 1000 UNITS: 25 TAB at 08:47

## 2020-12-04 LAB
ANION GAP SERPL CALCULATED.3IONS-SCNC: 1 MMOL/L (ref 5–14)
BUN SERPL-MCNC: 20 MG/DL (ref 5–25)
CALCIUM SERPL-MCNC: 9.4 MG/DL (ref 8.4–10.2)
CHLORIDE SERPL-SCNC: 105 MMOL/L (ref 97–108)
CO2 SERPL-SCNC: 34 MMOL/L (ref 22–30)
CREAT SERPL-MCNC: 0.97 MG/DL (ref 0.6–1.2)
GFR SERPL CREATININE-BSD FRML MDRD: 55 ML/MIN/1.73SQ M
GLUCOSE SERPL-MCNC: 103 MG/DL (ref 70–99)
LITHIUM SERPL-SCNC: 0.6 MMOL/L (ref 0.6–1.2)
POTASSIUM SERPL-SCNC: 4.7 MMOL/L (ref 3.6–5)
SODIUM SERPL-SCNC: 140 MMOL/L (ref 137–147)

## 2020-12-04 PROCEDURE — 80178 ASSAY OF LITHIUM: CPT | Performed by: PHYSICIAN ASSISTANT

## 2020-12-04 PROCEDURE — 80048 BASIC METABOLIC PNL TOTAL CA: CPT | Performed by: INTERNAL MEDICINE

## 2020-12-04 RX ORDER — LITHIUM CARBONATE 300 MG/1
300 CAPSULE ORAL EVERY MORNING
Status: DISCONTINUED | OUTPATIENT
Start: 2020-12-04 | End: 2020-12-07

## 2020-12-04 RX ORDER — QUETIAPINE FUMARATE 25 MG/1
25 TABLET, FILM COATED ORAL
Status: DISCONTINUED | OUTPATIENT
Start: 2020-12-04 | End: 2020-12-04

## 2020-12-04 RX ADMIN — DIVALPROEX SODIUM 500 MG: 500 TABLET, EXTENDED RELEASE ORAL at 21:19

## 2020-12-04 RX ADMIN — Medication 10 MG: at 21:18

## 2020-12-04 RX ADMIN — POLYETHYLENE GLYCOL 3350 17 G: 17 POWDER, FOR SOLUTION ORAL at 08:12

## 2020-12-04 RX ADMIN — CHOLECALCIFEROL TAB 25 MCG (1000 UNIT) 1000 UNITS: 25 TAB at 08:11

## 2020-12-04 RX ADMIN — OLANZAPINE 2.5 MG: 2.5 TABLET, FILM COATED ORAL at 08:12

## 2020-12-04 RX ADMIN — OLANZAPINE 2.5 MG: 2.5 TABLET, FILM COATED ORAL at 21:19

## 2020-12-04 RX ADMIN — LITHIUM CARBONATE 300 MG: 300 CAPSULE, GELATIN COATED ORAL at 09:28

## 2020-12-04 RX ADMIN — TEMAZEPAM 30 MG: 15 CAPSULE ORAL at 21:18

## 2020-12-04 RX ADMIN — METOPROLOL SUCCINATE 25 MG: 25 TABLET, EXTENDED RELEASE ORAL at 21:19

## 2020-12-04 RX ADMIN — LEVOTHYROXINE SODIUM 25 MCG: 25 TABLET ORAL at 06:30

## 2020-12-04 RX ADMIN — SENNOSIDES AND DOCUSATE SODIUM 1 TABLET: 8.6; 5 TABLET ORAL at 21:19

## 2020-12-04 RX ADMIN — LORATADINE 10 MG: 10 TABLET ORAL at 08:11

## 2020-12-04 RX ADMIN — DIVALPROEX SODIUM 250 MG: 250 TABLET, DELAYED RELEASE ORAL at 08:12

## 2020-12-04 RX ADMIN — METOPROLOL SUCCINATE 25 MG: 25 TABLET, EXTENDED RELEASE ORAL at 08:12

## 2020-12-04 NOTE — DISCHARGE INSTR - APPOINTMENTS
Jose Salgado RN, our [employer], will be calling you after your discharge, on the phone number that you provided  She will be available as an additional support, if needed  If you wish to speak with her, you may contact Nuria Su at [phone]

## 2020-12-04 NOTE — TREATMENT PLAN
TREATMENT PLAN REVIEW - Dailey [age] y o  1940 female MRN: [id number]    [address] Room / [facility] Encounter: [id number]          Admit Date/Time:  12/3/2020  6:01 PM    Treatment Team: Attending Provider: Michael Linn MD; Advanced Practitioner: Blayne Lema PA-C; Patient Care Technician: Hollie Davidson; Patient Care Technician: Saroj Ortiz; Patient Care Technician: Meg Monroe; Registered Nurse: Bright Soria RN; Patient Care Assistant: Sofia Alvarez; Patient Care Assistant: Taylor Farr; Registered Nurse: Tameka Etienne; Patient Care Assistant: Yakov Anna    Diagnosis: Active Problems:    * No active hospital problems   *      Patient Strengths/Assets: family ties    Patient Barriers/Limitations: marital/family conflict, patient is on an involuntary commitment    Short Term Goals: decrease in psychotic symptoms, decrease in level of agitation, improvement in insight    Long Term Goals: resolution of manic symptoms    Progress Towards Goals: starting psychiatric medications as prescribed    Recommended Treatment: medication management, patient medication education, group therapy, milieu therapy, continued Behavioral Health psychiatric evaluation/assessment process    Treatment Frequency: daily medication monitoring, group and milieu therapy daily, monitoring through interdisciplinary rounds, monitoring through weekly patient care conferences    Expected Discharge Date:  12/20    Discharge Plan: referral for outpatient medication management with a psychiatrist, referral for outpatient psychotherapy    Treatment Plan Created/Updated By: Michael Linn MD

## 2020-12-04 NOTE — NURSING NOTE
Pt alert earlier in the shift  Presently sitting in chair with eyes closed  Pt ate 100% of breakfast and refused lunch  Lithium level 0 6 today  Seen by PA but did not complete H&P d/t sitting with eyes closed  Responds to pain  No c/o distress  Will continue to monitor and maintain q 7 min checks

## 2020-12-04 NOTE — CASE MANAGEMENT
Social work intern met with pt to complete 1:1 psychosocial  Pt declined interview at this time  Pt did provide verbal consent for her spouse, Nika Victoriateofilo

## 2020-12-04 NOTE — PLAN OF CARE
Pt cooperative engaged in 1 group and a self assessment interview in which she complained about her spouse in her garbled speech  Pt  fell asleep in the day room and displayed low endurance to interactive task engagement    Problem: Ineffective Coping  Goal: Participates in unit activities  Description: Interventions:  - Provide therapeutic environment   - Provide required programming   - Redirect inappropriate behaviors   Outcome: Progressing

## 2020-12-04 NOTE — PLAN OF CARE
Problem: Prexisting or High Potential for Compromised Skin Integrity  Goal: Skin integrity is maintained or improved  Description: INTERVENTIONS:  - Identify patients at risk for skin breakdown  - Assess and monitor skin integrity  - Assess and monitor nutrition and hydration status  - Monitor labs   - Assess for incontinence   - Turn and reposition patient  - Assist with mobility/ambulation  - Relieve pressure over bony prominences  - Avoid friction and shearing  - Provide appropriate hygiene as needed including keeping skin clean and dry  - Evaluate need for skin moisturizer/barrier cream  - Collaborate with interdisciplinary team   - Patient/family teaching  - Consider wound care consult   Outcome: Progressing     Problem: Alteration in Thoughts and Perception  Goal: Treatment Goal: Gain control of psychotic behaviors/thinking, reduce/eliminate presenting symptoms and demonstrate improved reality functioning upon discharge  Outcome: Progressing  Goal: Verbalize thoughts and feelings  Description: Interventions:  - Promote a nonjudgmental and trusting relationship with the patient through active listening and therapeutic communication  - Assess patient's level of functioning, behavior and potential for risk  - Engage patient in 1 on 1 interactions  - Encourage patient to express fears, feelings, frustrations, and discuss symptoms    - [place] patient to reality, help patient recognize reality-based thinking   - Administer medications as ordered and assess for potential side effects  - Provide the patient education related to the signs and symptoms of the illness and desired effects of prescribed medications  Outcome: Progressing  Goal: Refrain from acting on delusional thinking/internal stimuli  Description: Interventions:  - Monitor patient closely, per order   - Utilize least restrictive measures   - Set reasonable limits, give positive feedback for acceptable   - Administer medications as ordered and monitor of potential side effects  Outcome: Progressing  Goal: Agree to be compliant with medication regime, as prescribed and report medication side effects  Description: Interventions:  - Offer appropriate PRN medication and supervise ingestion; conduct AIMS, as needed   Outcome: Progressing  Goal: Attend and participate in unit activities, including therapeutic, recreational, and educational groups  Description: Interventions:  -Encourage Visitation and family involvement in care  Outcome: Progressing  Goal: Recognize dysfunctional thoughts, communicate reality-based thoughts at the time of discharge  Description: Interventions:  - Provide medication and psycho-education to assist patient in compliance and developing insight into his/her illness   Outcome: Progressing  Goal: Complete daily ADLs, including personal hygiene independently, as able  Description: Interventions:  - Observe, teach, and assist patient with ADLS  - Monitor and promote a balance of rest/activity, with adequate nutrition and elimination   Outcome: Progressing     Problem: Ineffective Coping  Goal: Cooperates with admission process  Description: Interventions:   - Complete admission process  Outcome: Progressing  Goal: Identifies ineffective coping skills  Outcome: Progressing  Goal: Identifies healthy coping skills  Outcome: Progressing  Goal: Demonstrates healthy coping skills  Outcome: Progressing  Goal: Patient/Family participate in treatment and DC plans  Description: Interventions:  - Provide therapeutic environment  Outcome: Progressing  Goal: Patient/Family verbalizes awareness of resources  Outcome: Progressing  Goal: Understands least restrictive measures  Description: Interventions:  - Utilize least restrictive behavior  Outcome: Progressing  Goal: Free from restraint events  Description: - Utilize least restrictive measures   - Provide behavioral interventions   - Redirect inappropriate behaviors   Outcome: Progressing     Problem: Alteration in Orientation  Goal: Treatment Goal: Demonstrate a reduction of confusion and improved orientation to person, place, time and/or situation upon discharge, according to optimum baseline/ability  Outcome: Progressing  Goal: Interact with staff daily  Description: Interventions:  - Assess and re-assess patient's level of orientation  - Engage patient in 1 on 1 interactions, daily, for a minimum of 15 minutes   - Establish rapport/trust with patient   Outcome: Progressing  Goal: Express concerns related to confused thinking related to:  Description: Interventions:  - Encourage patient to express feelings, fears, frustrations, hopes  - Assign consistent caregivers   - [place]/re-orient patient as needed  - Allow comfort items, as appropriate  - Provide visual cues, signs, etc    Outcome: Progressing  Goal: Allow medical examinations, as recommended  Description: Interventions:  - Provide physical/neurological exams and/or referrals, per provider   Outcome: Progressing  Goal: Cooperate with recommended testing/procedures  Description: Interventions:  - Determine need for ancillary testing  - Observe for mental status changes  - Implement falls/precaution protocol   Outcome: Progressing  Goal: Complete daily ADLs, including personal hygiene independently, as able  Description: Interventions:  - Observe, teach, and assist patient with ADLS  Outcome: Progressing     Problem: JULIO C  Goal: Will exhibit normal sleep and speech and no impulsivity  Description: INTERVENTIONS:  - Administer medication as ordered  - Set limits on impulsive behavior  - Make attempts to decrease external stimuli as possible  Outcome: Progressing     Problem: DISCHARGE PLANNING  Goal: Discharge to home or other facility with appropriate resources  Description: INTERVENTIONS:  - Identify barriers to discharge w/patient and caregiver  - Arrange for needed discharge resources and transportation as appropriate  - Identify discharge learning needs (meds, wound care, etc )  - Arrange for interpretive services to assist at discharge as needed  - Refer to Case Management Department for coordinating discharge planning if the patient needs post-hospital services based on physician/advanced practitioner order or complex needs related to functional status, cognitive ability, or social support system  Outcome: Progressing     Problem: Ineffective Coping  Goal: Participates in unit activities  Description: Interventions:  - Provide therapeutic environment   - Provide required programming   - Redirect inappropriate behaviors   Outcome: Progressing

## 2020-12-04 NOTE — CMS CERTIFICATION NOTE
Certification: Based upon physical, mental and social evaluations, I certify that inpatient psychiatric services are medically necessary for this patient for a duration of 12 midnights for the treatment of mejia  Available alternative community resources do not meet the patient's mental health care needs  I further attest that an established written individualized plan of care has been implemented and is outlined in the patient's medical records

## 2020-12-04 NOTE — CASE MANAGEMENT
12/04/20 0831   Team Meeting   Meeting Type Daily Rounds   Initial Conference Date 12/04/20   Team Members Present   Team Members Present Physician;Nurse;;Occupational Therapist   Physician Team Member Dr Prosper Louis Team Member [facility] RN   Care Management Team Member Pramod Blue   OT Team Member Stas Dallas   Patient/Family Present   Patient Present No   Patient's Family Present No     [facility], paranoia, delusions, agitation towards   Hx of bipolar d/o  Dr Wylene Sandifer patient  R[place] and irritable  Hx of COPD, Parkinson's, HTN

## 2020-12-04 NOTE — PROGRESS NOTES
12/04/20 0915 12/04/20 1000 12/04/20 1100   Activity/Group Checklist   Group Admission/Discharge  (self assessment interview completed ) Community meeting Exercise  (seated musical)   Attendance Attended Attended Did not attend   Attendance Duration (min) 0-15  (1-1) 16-30  --    Interactions Other (Comment)  (speech garbled at times yet stated upset w/spouse) Disorganized interaction  (drowsy)  --    Affect/Mood Calm Other (Comment)  (pt  not fully attending to topic discussion  )  --    Goals Achieved Identified feelings; Discussed coping strategies; Able to engage in interactions Able to listen to others  --       12/04/20 1330   Activity/Group Checklist   Group Life Skills   Attendance Did not attend   Attendance Duration (min)  --    Interactions  --    Affect/Mood  --    Goals Achieved  --
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
## 2020-12-04 NOTE — DISCHARGE INSTR - OTHER ORDERS
You are being discharged to: [address] [facility], [address]    Triggers you have identified during your hospitalization that led to your admission include: agitation  If you are unable to deal with your distressed mood alone, please contact your outpatient provider, Dr Darío King  If that is not effective and you continue to have a distressed mood or feel like you're in crisis, please contact 911 and go to the nearest emergency center  [facility] Crisis Intervention: [address] Suicide Prevention Lifeline:  [facility] [address] , [address] on Mental Illness ([place]) HELPLINE: 159.849.8401/Website: www suzanne org  *Substance Abuse and Mental Health Services Administration([facility]) [facility], which is a confidential, free, 24-hour-a-day, 365-day-a-year, information service for individuals and family members facing mental health and/or substance use disorders  This service provides referrals to local treatment facilities, support groups, and community-based organizations  Callers can also order free publications and other information  Call 9-369.241.2897/Website: www [facility] gov  *[facility] 2-1-1: This is a toll free, confidential, 24-hour-a-day service which connects you to a community  in your area who can help you find services and resources that are available to you locally and provide critical services that can improve and save lives  Call: 80  /Website: https://[employer]/    What you need to know aboutcoronavirus disease 2019 (COVID-19)     What is coronavirus disease 2019 (COVID-19)? Coronavirus disease 2019 (COVID-19) is a respiratory illness that can spread from person to person  The virus that causes COVID-19 is a novel coronavirus that was first identified during an investigation into an outbreak in [place]  Can people in [place]  get COVID-19? Yes  COVID-19 is spreading from person to person in parts of the [place]   Risk of infection with COVID-19 is higher for people who are close contacts of someone known to have COVID-19, for example healthcare workers, or household members  Other people at higher risk for infection are those who live in or have recently been in an area with ongoing spread of COVID-19  Learn more about places with ongoing spread at   [facility]  html#geographic  Have there been cases of COVID-19 in [place] ?   Yes  The first case of COVID-19 in the [place] was reported on January 21, 2020  The current count of cases of COVID-19 in the [facility] is available on [employer] [facility]  How does COVID-19 spread? The virus that causes COVID-19 probably emerged from an animal source, but is now spreading from person to person  The virus is thought to spread mainly between people who are in close contact with one another (within about 6 feet) through respiratory droplets produced when an infected person coughs or sneezes  It also may be possible that a person can get COVID-19 by touching a surface or object that has the virus on it and then touching their own mouth, nose, or possibly their eyes, but this is not thought to be the main way the virus spreads  Learn what is known about the spread of newly emerged coronaviruses at [facility]  What are the symptoms of COVID-19? Patients with COVID-19 have had mild to severe respiratory illness with symptoms of   fever   cough   shortness of breath  What are severe complications from this virus? Some patients have pneumonia in both lungs, multi-organ failure and in some cases death  How can I help protect myself? People can help protect themselves from respiratory illness with everyday preventive actions  Avoid close contact with people who are sick  Avoid touching your eyes, nose, and mouth withunwashed hands  Wash your hands often with soap and water for at least 20 seconds  Use an alcohol-based hand  that contains at least 60% alcohol if soap and water are not available  If you are sick, to keep from spreading respiratory illness to others, you should   Stay home when you are sick  Cover your cough or sneeze with a tissue, then throw the tissue in the trash  Clean and disinfect frequently touched objectsand surfaces  What should I do if I recently traveled from an area with ongoing spread of COVID-19? If you have traveled from an affected area, there may be restrictions on your movements for up to 2 weeks  If you develop symptoms during that period (fever, cough, trouble breathing), seek medical advice  Call the office of your health care provider before you go, and tell them about your travel and your symptoms  They will give you instructions on how to get care without exposing other people to your illness  While sick, avoid contact with people, don't go out and delay any travel to reduce the possibility of spreading illness to others  Is there a vaccine? There is currently no vaccine to protect against COVID-19  The best way to prevent infection is to take everyday preventive actions, like avoiding close contact with people who are sick and washing your hands often  Is there a treatment? There is no specific antiviral treatment for COVID-19  People with COVID-19 can seek medical care to helprelieve symptoms  For more information: www cdc gov/MUHUL46BW 506006-W 03/03/2020       What to do if you are sick withcoronavirus disease 2019 (COVID-19)     If you are sick with COVID-19 or suspect you are infected with the virus that causes COVID-19, follow the steps below to help prevent the disease from spreading to people in your home and community  Stay home except to get medical care   You should restrict activities outside your home, except for getting medical care   Do not go to work, school, or public areas  Avoid using public transportation, ride-sharing, or taxis  Separate yourself from other people and animals inyour home  People: As much as possible, you should stay in a specific room and away from other people in your home  Also, you should use a separate bathroom, if available  Animals: Do not handle pets or other animals while sick  See COVID-19 and Animals for more information  Call ahead before visiting your doctor   If you have a medical appointment, call the healthcare provider and tell them that you have or may have COVID-19  This will help the healthcare provider's office take steps to keep other people from getting infected or exposed  Wear a facemask  You should wear a facemask when you are around other people (e g , sharing a room or vehicle) or pets and before you enter a healthcare provider's office  If you are not able to wear a facemask (for example, because it causes trouble breathing), then people who live with you should not stay in the same room with you, or they should wear a facemask if they enteryour room  Cover your coughs and sneezes   Cover your mouth and nose with a tissue when you cough or sneeze  Throw used tissues in a lined trash can; immediately wash your hands with soap and water for at least 20 seconds or clean your hands with an alcohol-based hand  that contains at least 60 to 95% alcohol, covering all surfaces of your hands and rubbing them together until they feel dry  Soap and water should be used preferentially if hands are visibly dirty  Avoid sharing personal household items   You should not share dishes, drinking glasses, cups, eating utensils, towels, or bedding with other people or pets in your home  After using these items, they should be washed thoroughly with soap and water  Clean your hands often  Wash your hands often with soap and water for at least 20 seconds   If soap and water are not available, clean your hands with an alcohol-based hand  that contains at least 60% alcohol, covering all surfaces of your hands and rubbing them together until they feel dry  Soap and water should be used preferentially if hands are visibly dirty  Avoid touching your eyes, nose, and mouth with unwashed hands  Clean all "high-touch" surfaces every day  High touch surfaces include counters, tabletops, doorknobs, bathroom fixtures, toilets, phones, keyboards, tablets, and bedside tables  Also, clean any surfaces that may have blood, stool, or body fluids on them  Use a household cleaning spray or wipe, according to the label instructions  Labels contain instructions for safe and effective use of the cleaning product including precautions you should take when applying the product, such as wearing gloves and making sure you have good ventilation during use of the product  Monitor your symptoms  Seek prompt medical attention if your illness is worsening (e g , difficulty breathing)  Before seeking care, call your healthcare provider and tell them that you have, or are being evaluated for, COVID-19  Put on a facemask before you enter the facility  These steps will help the healthcare provider's office to keep other people in the office or waiting room from getting infectedor exposed  Ask your healthcare provider to call the local or [facility] health department  Persons who are placed under active monitoring or facilitated self-monitoring should follow instructions provided by their local health department or occupational health professionals, as appropriate  If you have a medical emergency and need to call 911, notify the dispatch personnel that you have, or are being evaluated for COVID-19  If possible, put on a facemask before emergency medical services arrive  Discontinuing home isolation  Patients with confirmed COVID-19 should remain under home isolation precautions until the risk of secondary transmission to others is thought to be low   The decision to discontinue home isolation precautions should be made on a case-by-case basis, in consultation with healthcare providers and state and local health departments  For more information: www cdc gov/PVVWG23TT 633590-Q 02/24/2020       Stay home when you are sick,except to get medical care  Wash your hands often with soap and water for at least 20 seconds  Cover your cough or sneeze with a tissue, then throw the tissue in the trash  Clean and disinfect frequently touched objects and surfaces  Avoid touching your eyes, nose, and mouth  STOP THE SPREAD OF GERMS  For more information: www cdc gov/COVID19 Avoid close contact with people who are sick  Help prevent the spread of respiratory diseases like COVID-19
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
## 2020-12-04 NOTE — PROGRESS NOTES
12/04/20 1341   Team Meeting   Meeting Type Tx Team Meeting   Initial Conference Date 12/04/20   Team Members Present   Team Members Present Physician;Nurse;   Physician Team Member Dr Omari Patton Team Member [facility] Management Team Member Madalyn   Patient/Family Present   Patient Present No  (Pt sleeping)   Patient's Family Present No  (Reviewed with pt's )     Treatment goals include: decrease psychosis and agitation, manage alterations in thoughts and perceptions, build effective coping skills, discharge planning

## 2020-12-04 NOTE — PLAN OF CARE
Problem: Alteration in Thoughts and Perception  Goal: Treatment Goal: Gain control of psychotic behaviors/thinking, reduce/eliminate presenting symptoms and demonstrate improved reality functioning upon discharge  Outcome: Not Progressing  Goal: Verbalize thoughts and feelings  Description: Interventions:  - Promote a nonjudgmental and trusting relationship with the patient through active listening and therapeutic communication  - Assess patient's level of functioning, behavior and potential for risk  - Engage patient in 1 on 1 interactions  - Encourage patient to express fears, feelings, frustrations, and discuss symptoms    - [place] patient to reality, help patient recognize reality-based thinking   - Administer medications as ordered and assess for potential side effects  - Provide the patient education related to the signs and symptoms of the illness and desired effects of prescribed medications  Outcome: Not Progressing  Goal: Refrain from acting on delusional thinking/internal stimuli  Description: Interventions:  - Monitor patient closely, per order   - Utilize least restrictive measures   - Set reasonable limits, give positive feedback for acceptable   - Administer medications as ordered and monitor of potential side effects  Outcome: Not Progressing  Goal: Agree to be compliant with medication regime, as prescribed and report medication side effects  Description: Interventions:  - Offer appropriate PRN medication and supervise ingestion; conduct AIMS, as needed   Outcome: Not Progressing  Goal: Attend and participate in unit activities, including therapeutic, recreational, and educational groups  Description: Interventions:  -Encourage Visitation and family involvement in care  Outcome: Not Progressing  Goal: Recognize dysfunctional thoughts, communicate reality-based thoughts at the time of discharge  Description: Interventions:  - Provide medication and psycho-education to assist patient in compliance and developing insight into his/her illness   Outcome: Not Progressing  Goal: Complete daily ADLs, including personal hygiene independently, as able  Description: Interventions:  - Observe, teach, and assist patient with ADLS  - Monitor and promote a balance of rest/activity, with adequate nutrition and elimination   Outcome: Not Progressing     Problem: Ineffective Coping  Goal: Cooperates with admission process  Description: Interventions:   - Complete admission process  Outcome: Not Progressing  Goal: Identifies ineffective coping skills  Outcome: Not Progressing  Goal: Identifies healthy coping skills  Outcome: Not Progressing  Goal: Demonstrates healthy coping skills  Outcome: Not Progressing  Goal: Patient/Family participate in treatment and DC plans  Description: Interventions:  - Provide therapeutic environment  Outcome: Not Progressing  Goal: Patient/Family verbalizes awareness of resources  Outcome: Not Progressing  Goal: Understands least restrictive measures  Description: Interventions:  - Utilize least restrictive behavior  Outcome: Not Progressing  Goal: Free from restraint events  Description: - Utilize least restrictive measures   - Provide behavioral interventions   - Redirect inappropriate behaviors   Outcome: Not Progressing     Problem: Alteration in Orientation  Goal: Treatment Goal: Demonstrate a reduction of confusion and improved orientation to person, place, time and/or situation upon discharge, according to optimum baseline/ability  Outcome: Not Progressing  Goal: Interact with staff daily  Description: Interventions:  - Assess and re-assess patient's level of orientation  - Engage patient in 1 on 1 interactions, daily, for a minimum of 15 minutes   - Establish rapport/trust with patient   Outcome: Not Progressing  Goal: Express concerns related to confused thinking related to:  Description: Interventions:  - Encourage patient to express feelings, fears, frustrations, hopes  - Assign consistent caregivers   - [place]/re-orient patient as needed  - Allow comfort items, as appropriate  - Provide visual cues, signs, etc    Outcome: Not Progressing  Goal: Allow medical examinations, as recommended  Description: Interventions:  - Provide physical/neurological exams and/or referrals, per provider   Outcome: Not Progressing  Goal: Cooperate with recommended testing/procedures  Description: Interventions:  - Determine need for ancillary testing  - Observe for mental status changes  - Implement falls/precaution protocol   Outcome: Not Progressing  Goal: Complete daily ADLs, including personal hygiene independently, as able  Description: Interventions:  - Observe, teach, and assist patient with ADLS  Outcome: Not Progressing     Problem: JULIO C  Goal: Will exhibit normal sleep and speech and no impulsivity  Description: INTERVENTIONS:  - Administer medication as ordered  - Set limits on impulsive behavior  - Make attempts to decrease external stimuli as possible  Outcome: Not Progressing

## 2020-12-05 PROBLEM — Z00.8 MEDICAL CLEARANCE FOR PSYCHIATRIC ADMISSION: Status: ACTIVE | Noted: 2020-12-05

## 2020-12-05 PROCEDURE — 99232 SBSQ HOSP IP/OBS MODERATE 35: CPT | Performed by: PSYCHIATRY & NEUROLOGY

## 2020-12-05 PROCEDURE — 99222 1ST HOSP IP/OBS MODERATE 55: CPT | Performed by: INTERNAL MEDICINE

## 2020-12-05 RX ORDER — DIVALPROEX SODIUM 125 MG/1
500 CAPSULE, COATED PELLETS ORAL
Status: DISCONTINUED | OUTPATIENT
Start: 2020-12-05 | End: 2020-12-22 | Stop reason: HOSPADM

## 2020-12-05 RX ADMIN — SENNOSIDES AND DOCUSATE SODIUM 1 TABLET: 8.6; 5 TABLET ORAL at 21:02

## 2020-12-05 RX ADMIN — CHOLECALCIFEROL TAB 25 MCG (1000 UNIT) 1000 UNITS: 25 TAB at 09:12

## 2020-12-05 RX ADMIN — Medication 10 MG: at 21:02

## 2020-12-05 RX ADMIN — DIVALPROEX SODIUM 500 MG: 125 CAPSULE, COATED PELLETS ORAL at 21:03

## 2020-12-05 RX ADMIN — METOPROLOL SUCCINATE 25 MG: 25 TABLET, EXTENDED RELEASE ORAL at 09:12

## 2020-12-05 RX ADMIN — OLANZAPINE 2.5 MG: 2.5 TABLET, FILM COATED ORAL at 09:13

## 2020-12-05 RX ADMIN — LITHIUM CARBONATE 300 MG: 300 CAPSULE, GELATIN COATED ORAL at 09:13

## 2020-12-05 RX ADMIN — LORATADINE 10 MG: 10 TABLET ORAL at 09:13

## 2020-12-05 RX ADMIN — METOPROLOL SUCCINATE 25 MG: 25 TABLET, EXTENDED RELEASE ORAL at 21:31

## 2020-12-05 RX ADMIN — TEMAZEPAM 30 MG: 15 CAPSULE ORAL at 21:02

## 2020-12-05 RX ADMIN — POLYETHYLENE GLYCOL 3350 17 G: 17 POWDER, FOR SOLUTION ORAL at 09:13

## 2020-12-05 RX ADMIN — OLANZAPINE 2.5 MG: 2.5 TABLET, FILM COATED ORAL at 21:03
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
## 2020-12-05 NOTE — CONSULTS
Consult- Trev Brown 1940, 79 y o  female MRN: [id number]    Unit/Bed#: [facility] 902-51 Encounter: [id number]    Primary Care Provider: Prasanna Johnson MD   Date and time admitted to hospital: 12/3/2020  6:01 PM      Inpatient consult for Medical Clearance for [facility] patient  Consult performed by: Jonna Vega MD  Consult ordered by: Florian Quinones MD    Inpatient consult for Medical Clearance for [facility] patient  Consult performed by: Jonna Vega MD  Consult ordered by: Carlos Forde MD        Medical clearance for psychiatric admission  Assessment & Plan  Patient is medically cleared for psychiatric admission    ANNA MARIE (acute kidney injury) [facility])  Assessment & Plan  Resolved    Essential hypertension  Assessment & Plan  Blood pressure acceptable  Continue current medications    * Bipolar 1 disorder [facility])  Assessment & Plan  Management per psychiatric          Recommendations for Discharge:  ·     Counseling / Coordination of Care Time: 30 minutes  Greater than 50% of total time spent on patient counseling and coordination of care  Collaboration of Care: Were Recommendations Directly Discussed with Primary Treatment Team? - Yes     History of Present Illness:    Trev Brown is a 79 y o  female who is originally admitted to the psychiatric service due to psychosis  We are consulted for medical clearance  She was originally admitted to [address] acute care for dilutional, paranoid behaviors had overt psychosis on admission  She also had acute kidney injury which improved with fluid hydration  Patient was evaluated by behavioral health and recommended inpatient psych admission  She she is discharged and admitted to inpatient psychiatric service  She is not answering much questions  As per nursing staff she was sleeping all day yesterday  She is awake today watching TV  No acute events were reported       Review of Systems:    Review of Systems    Past Medical and Surgical History:     Past Medical History:   Diagnosis Date    [facility] [address] [facility]     [facility]     Essential hypertension     Hypothyroidism     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HCC)        Past Surgical History:   Procedure Laterality Date    TONSILLECTOMY      TOTAL HIP ARTHROPLASTY Right        Meds/Allergies:    all medications and allergies reviewed    Allergies: Allergies   Allergen Reactions    Ampicillin Other (See Comments)     per t-system    Penicillins        Social History:     Marital Status: /Civil Union    Substance Use History:   Social History     Substance and Sexual Activity   Alcohol Use Never    Frequency: Never    Binge frequency: Never     Social History     Tobacco Use   Smoking Status Never Smoker   Smokeless Tobacco Never Used     Social History     Substance and Sexual Activity   Drug Use No       Family History:    Family History   Problem Relation Age of Onset    Diabetes Mother     Cancer Brother        Physical Exam:     Vitals:   Blood Pressure: 127/57 (12/05/20 0649)  Pulse: 69 (12/05/20 0649)  Temperature: 98 °F (36 7 °C) (12/05/20 0649)  Temp Source: Tympanic (12/05/20 0649)  Respirations: 16 (12/05/20 0649)  Height: 5' 4" (162 6 cm) (12/03/20 1801)  Weight - Scale: 59 5 kg (131 lb 2 8 oz) (12/03/20 1801)  SpO2: 93 % (12/05/20 0649)    Physical Exam  Vitals signs and nursing note reviewed  Constitutional:       Appearance: Normal appearance  Cardiovascular:      Rate and Rhythm: Normal rate and regular rhythm  Pulmonary:      Effort: Pulmonary effort is normal       Breath sounds: Normal breath sounds  Abdominal:      General: Abdomen is flat  Palpations: Abdomen is soft  Skin:     General: Skin is warm  Additional Data:     Lab Results: I have personally reviewed pertinent reports        Results from last 7 days   Lab Units 12/02/20  0536 12/01/20  1726   WBC Thousand/uL 7 00 9 20   HEMOGLOBIN g/dL 10 9* 11 9*   HEMATOCRIT % 33 0* 35 8*   PLATELETS Thousands/uL 292 350   NEUTROS PCT %  --  67*   LYMPHS PCT %  --  21*   MONOS PCT %  --  12*   EOS PCT %  --  0     Results from last 7 days   Lab Units 12/04/20  0533  12/01/20  1726   SODIUM mmol/L 140   < > 137   POTASSIUM mmol/L 4 7   < > 4 1   CHLORIDE mmol/L 105   < > 103   CO2 mmol/L 34*   < > 29   BUN mg/dL 20   < > 42*   CREATININE mg/dL 0 97   < > 1 62*   ANION GAP mmol/L 1*   < > 5   CALCIUM mg/dL 9 4   < > 10 1   ALBUMIN g/dL  --   --  4 0   TOTAL BILIRUBIN mg/dL  --   --  0 50   ALK PHOS U/L  --   --  52   ALT U/L  --   --  18   AST U/L  --   --  47*   GLUCOSE RANDOM mg/dL 103*   < > 84    < > = values in this interval not displayed  No results found for: HGBA1C            Imaging: I have personally reviewed pertinent reports  No orders to display       EKG, Pathology, and Other Studies Reviewed on Admission:   · EKG:     ** Please Note: This note has been constructed using a voice recognition system   **

## 2020-12-05 NOTE — PLAN OF CARE
Problem: Alteration in Thoughts and Perception  Goal: Treatment Goal: Gain control of psychotic behaviors/thinking, reduce/eliminate presenting symptoms and demonstrate improved reality functioning upon discharge  Outcome: Progressing  Goal: Verbalize thoughts and feelings  Description: Interventions:  - Promote a nonjudgmental and trusting relationship with the patient through active listening and therapeutic communication  - Assess patient's level of functioning, behavior and potential for risk  - Engage patient in 1 on 1 interactions  - Encourage patient to express fears, feelings, frustrations, and discuss symptoms    - [place] patient to reality, help patient recognize reality-based thinking   - Administer medications as ordered and assess for potential side effects  - Provide the patient education related to the signs and symptoms of the illness and desired effects of prescribed medications  Outcome: Progressing  Goal: Refrain from acting on delusional thinking/internal stimuli  Description: Interventions:  - Monitor patient closely, per order   - Utilize least restrictive measures   - Set reasonable limits, give positive feedback for acceptable   - Administer medications as ordered and monitor of potential side effects  Outcome: Progressing  Goal: Agree to be compliant with medication regime, as prescribed and report medication side effects  Description: Interventions:  - Offer appropriate PRN medication and supervise ingestion; conduct AIMS, as needed   Outcome: Progressing  Goal: Attend and participate in unit activities, including therapeutic, recreational, and educational groups  Description: Interventions:  -Encourage Visitation and family involvement in care  Outcome: Progressing  Goal: Recognize dysfunctional thoughts, communicate reality-based thoughts at the time of discharge  Description: Interventions:  - Provide medication and psycho-education to assist patient in compliance and developing insight into his/her illness   Outcome: Progressing  Goal: Complete daily ADLs, including personal hygiene independently, as able  Description: Interventions:  - Observe, teach, and assist patient with ADLS  - Monitor and promote a balance of rest/activity, with adequate nutrition and elimination   Outcome: Progressing     Problem: JULIO C  Goal: Will exhibit normal sleep and speech and no impulsivity  Description: INTERVENTIONS:  - Administer medication as ordered  - Set limits on impulsive behavior  - Make attempts to decrease external stimuli as possible  Outcome: Progressing

## 2020-12-05 NOTE — NURSING NOTE
Pt calm with an irritable edge this shift  Pt difficult to understand with mumbled and rambling speech  Pt states that she is in a hospital because her  sent her here  Adds "he's no  of mine " Denies all symptoms at this time  Pt is diffusely tremulous which makes eating somewhat difficult  Assistance given  Pt noted to have some coughing spells while eating indicating possible aspiration  Speech evaluation ordered by Dr Seble Espinosa  Pt intermittently napping and watching TV in the dayroom  No aggression noted  Decreased appetite, completing 50% breakfast and 25% lunch

## 2020-12-05 NOTE — NURSING NOTE
Pt is medication compliant  And 25% at dinner time  Pt was visible in jessica chair in the dayroom  No symptoms reported   Will continue to monitor

## 2020-12-06 LAB
LITHIUM SERPL-SCNC: 0.8 MMOL/L (ref 0.6–1.2)
VALPROATE SERPL-MCNC: 70.3 UG/ML (ref 50–120)

## 2020-12-06 PROCEDURE — 80164 ASSAY DIPROPYLACETIC ACD TOT: CPT | Performed by: PSYCHIATRY & NEUROLOGY

## 2020-12-06 PROCEDURE — 99232 SBSQ HOSP IP/OBS MODERATE 35: CPT | Performed by: PSYCHIATRY & NEUROLOGY

## 2020-12-06 PROCEDURE — 80178 ASSAY OF LITHIUM: CPT | Performed by: PSYCHIATRY & NEUROLOGY

## 2020-12-06 RX ADMIN — SENNOSIDES AND DOCUSATE SODIUM 1 TABLET: 8.6; 5 TABLET ORAL at 21:08

## 2020-12-06 RX ADMIN — OLANZAPINE 2.5 MG: 2.5 TABLET, FILM COATED ORAL at 21:08

## 2020-12-06 RX ADMIN — METOPROLOL SUCCINATE 25 MG: 25 TABLET, EXTENDED RELEASE ORAL at 21:08

## 2020-12-06 RX ADMIN — POLYETHYLENE GLYCOL 3350 17 G: 17 POWDER, FOR SOLUTION ORAL at 09:19

## 2020-12-06 RX ADMIN — TEMAZEPAM 30 MG: 15 CAPSULE ORAL at 21:08

## 2020-12-06 RX ADMIN — LEVOTHYROXINE SODIUM 50 MCG: 25 TABLET ORAL at 07:05

## 2020-12-06 RX ADMIN — DIVALPROEX SODIUM 500 MG: 125 CAPSULE, COATED PELLETS ORAL at 21:08

## 2020-12-06 RX ADMIN — LITHIUM CARBONATE 300 MG: 300 CAPSULE, GELATIN COATED ORAL at 09:19

## 2020-12-06 RX ADMIN — METOPROLOL SUCCINATE 25 MG: 25 TABLET, EXTENDED RELEASE ORAL at 09:19

## 2020-12-06 RX ADMIN — LEVOTHYROXINE SODIUM 25 MCG: 25 TABLET ORAL at 07:02

## 2020-12-06 RX ADMIN — CHOLECALCIFEROL TAB 25 MCG (1000 UNIT) 1000 UNITS: 25 TAB at 09:19

## 2020-12-06 RX ADMIN — OLANZAPINE 2.5 MG: 2.5 TABLET, FILM COATED ORAL at 09:19

## 2020-12-06 RX ADMIN — Medication 10 MG: at 21:07

## 2020-12-06 RX ADMIN — LORATADINE 10 MG: 10 TABLET ORAL at 09:19

## 2020-12-06 NOTE — PROGRESS NOTES
C/O" Elizabeth Dodge and my  did this put me here, I want to be discharged "    Report from staff regarding this patient received and record reviewed  prior to seeing this patient   Behavior over the last 24 hours: , this patient seen as part of ongoing psychiatric infection treatment patient continued to be irritable, edgy, easily get irritable and upset, focus that her  put her here on the unit and she wants to leave, she feels Dr Zenaida Johnson crossed his in a plot with her  to keep her here and not let her come to home, she is taking medication,lithium level 0 8 mEq/L and VPA 70     Sleep:  Appetite:  Medication side effects:  ROS:  Mental Status Evaluation:  Appearance:  Dressed appropraitely, average height white woman medium build anxious upset angry   Behavior:  cooperative but easily get a angry and annoyed   Speech:  Difficult to understand   Mood:  Angry   Affect:  appropriate     Thought Process:  Disorganized   Thought Content:  Paranoid   Perceptual Disturbances: Denied AV hallucination   Risk Potential: NO GOLDEN    Sensorium:  normal   Cognition:  intact   Consciousness:  Alert, OX2   Attention: Fair   Insight:  poor   Judgment: poor   Gait/Station: With in normal range   Motor Activity: With in normal range     Progress Toward Goals: working on current treatment goals, no changes  Made in treatment plan   Recommended Treatment: Continue with group therapy, milieu therapy and occupational therapy  Risks, benefits and possible side effects of Medications:   Risks, benefits, and possible side effects of medications explained to patient and patient verbalizes understanding        Medications:   current meds:   Current Facility-Administered Medications   Medication Dose Route Frequency    acetaminophen (TYLENOL) tablet 650 mg  650 mg Oral Q6H PRN    cholecalciferol (VITAMIN D3) tablet 1,000 Units  1,000 Units Oral Daily    divalproex sodium (DEPAKOTE SPRINKLE) capsule 500 mg  500 mg Oral HS    levothyroxine tablet 25 mcg  25 mcg Oral Every Other Day    levothyroxine tablet 50 mcg  50 mcg Oral Every Other Day    lithium carbonate capsule 300 mg  300 mg Oral QAM    loratadine (CLARITIN) tablet 10 mg  10 mg Oral Daily    LORazepam (ATIVAN) tablet 0 5 mg  0 5 mg Oral Q8H PRN    melatonin tablet 10 mg  10 mg Oral HS    metoprolol succinate (TOPROL-XL) 24 hr tablet 25 mg  25 mg Oral BID    OLANZapine (ZyPREXA) tablet 2 5 mg  2 5 mg Oral BID    polyethylene glycol (MIRALAX) packet 17 g  17 g Oral Daily    senna-docusate sodium (SENOKOT S) 8 6-50 mg per tablet 1 tablet  1 tablet Oral HS    temazepam (RESTORIL) capsule 30 mg  30 mg Oral HS     Labs: NA    Assessment, Diagnosis  and Plan: continue with current meds and goals, F/U tomorrow    Counseling / Coordination of Care  Total floor / unit time spent today20 minutes  minutes  Greater than 50% of total time was spent with the patient and / or family counseling and / or coordination of care  A description of the counseling / coordination of care:      Tasha Hernandez MD

## 2020-12-06 NOTE — NURSING NOTE
Pt calm, cooperative with care, and medication adherent  Pt denies all symptoms at this time  Can be irritable at times with care- has difficulty accepting help from staff  Pt has been visible in the dayroom in jessica-chair intermittently napping  No agitation or aggression noted this shift

## 2020-12-06 NOTE — PLAN OF CARE
Problem: Alteration in Thoughts and Perception  Goal: Treatment Goal: Gain control of psychotic behaviors/thinking, reduce/eliminate presenting symptoms and demonstrate improved reality functioning upon discharge  Outcome: Progressing  Goal: Verbalize thoughts and feelings  Description: Interventions:  - Promote a nonjudgmental and trusting relationship with the patient through active listening and therapeutic communication  - Assess patient's level of functioning, behavior and potential for risk  - Engage patient in 1 on 1 interactions  - Encourage patient to express fears, feelings, frustrations, and discuss symptoms    - [place] patient to reality, help patient recognize reality-based thinking   - Administer medications as ordered and assess for potential side effects  - Provide the patient education related to the signs and symptoms of the illness and desired effects of prescribed medications  Outcome: Progressing  Goal: Refrain from acting on delusional thinking/internal stimuli  Description: Interventions:  - Monitor patient closely, per order   - Utilize least restrictive measures   - Set reasonable limits, give positive feedback for acceptable   - Administer medications as ordered and monitor of potential side effects  Outcome: Progressing  Goal: Agree to be compliant with medication regime, as prescribed and report medication side effects  Description: Interventions:  - Offer appropriate PRN medication and supervise ingestion; conduct AIMS, as needed   Outcome: Progressing  Goal: Attend and participate in unit activities, including therapeutic, recreational, and educational groups  Description: Interventions:  -Encourage Visitation and family involvement in care  Outcome: Progressing  Goal: Recognize dysfunctional thoughts, communicate reality-based thoughts at the time of discharge  Description: Interventions:  - Provide medication and psycho-education to assist patient in compliance and developing insight into his/her illness   Outcome: Progressing  Goal: Complete daily ADLs, including personal hygiene independently, as able  Description: Interventions:  - Observe, teach, and assist patient with ADLS  - Monitor and promote a balance of rest/activity, with adequate nutrition and elimination   Outcome: Progressing     Problem: Alteration in Orientation  Goal: Treatment Goal: Demonstrate a reduction of confusion and improved orientation to person, place, time and/or situation upon discharge, according to optimum baseline/ability  Outcome: Progressing  Goal: Interact with staff daily  Description: Interventions:  - Assess and re-assess patient's level of orientation  - Engage patient in 1 on 1 interactions, daily, for a minimum of 15 minutes   - Establish rapport/trust with patient   Outcome: Progressing  Goal: Express concerns related to confused thinking related to:  Description: Interventions:  - Encourage patient to express feelings, fears, frustrations, hopes  - Assign consistent caregivers   - [place]/re-orient patient as needed  - Allow comfort items, as appropriate  - Provide visual cues, signs, etc    Outcome: Progressing  Goal: Allow medical examinations, as recommended  Description: Interventions:  - Provide physical/neurological exams and/or referrals, per provider   Outcome: Progressing  Goal: Cooperate with recommended testing/procedures  Description: Interventions:  - Determine need for ancillary testing  - Observe for mental status changes  - Implement falls/precaution protocol   Outcome: Progressing  Goal: Complete daily ADLs, including personal hygiene independently, as able  Description: Interventions:  - Observe, teach, and assist patient with ADLS  Outcome: Progressing

## 2020-12-06 NOTE — NURSING NOTE
Patient was in her room lying quietly in bed but was brought out to the milieu during dinner and stayed thereafter watching tv  Patient is difficult to understand due to her rambling speech  No agitation or aggression noted  Patient has swallow eval ordered  Patient was coughing when taking her medications but will not allow staff to put them in applesauce or pudding  Patient was compliant with HS medications  Safety checks ongoing

## 2020-12-07 PROCEDURE — 92610 EVALUATE SWALLOWING FUNCTION: CPT

## 2020-12-07 RX ORDER — TEMAZEPAM 15 MG/1
15 CAPSULE ORAL
Status: DISCONTINUED | OUTPATIENT
Start: 2020-12-07 | End: 2020-12-11

## 2020-12-07 RX ORDER — OLANZAPINE 5 MG/1
5 TABLET ORAL
Status: DISCONTINUED | OUTPATIENT
Start: 2020-12-07 | End: 2020-12-14

## 2020-12-07 RX ORDER — LITHIUM CARBONATE 150 MG/1
150 CAPSULE ORAL EVERY MORNING
Status: DISCONTINUED | OUTPATIENT
Start: 2020-12-08 | End: 2020-12-22 | Stop reason: HOSPADM

## 2020-12-07 RX ADMIN — METOPROLOL SUCCINATE 25 MG: 25 TABLET, EXTENDED RELEASE ORAL at 21:17

## 2020-12-07 RX ADMIN — CHOLECALCIFEROL TAB 25 MCG (1000 UNIT) 1000 UNITS: 25 TAB at 09:11

## 2020-12-07 RX ADMIN — LORATADINE 10 MG: 10 TABLET ORAL at 09:11

## 2020-12-07 RX ADMIN — METOPROLOL SUCCINATE 25 MG: 25 TABLET, EXTENDED RELEASE ORAL at 09:11

## 2020-12-07 RX ADMIN — Medication 10 MG: at 21:16

## 2020-12-07 RX ADMIN — SENNOSIDES AND DOCUSATE SODIUM 1 TABLET: 8.6; 5 TABLET ORAL at 21:19

## 2020-12-07 RX ADMIN — TEMAZEPAM 15 MG: 15 CAPSULE ORAL at 21:17

## 2020-12-07 RX ADMIN — OLANZAPINE 5 MG: 5 TABLET, FILM COATED ORAL at 21:17

## 2020-12-07 RX ADMIN — DIVALPROEX SODIUM 500 MG: 125 CAPSULE, COATED PELLETS ORAL at 21:16

## 2020-12-07 RX ADMIN — POLYETHYLENE GLYCOL 3350 17 G: 17 POWDER, FOR SOLUTION ORAL at 09:11

## 2020-12-07 NOTE — TREATMENT TEAM
12/06/20 0900   Team Meeting   Meeting Type Daily Rounds   Team Members Present   Team Members Present Physician;Nurse   Physician Team Member Ac Link   Nursing Team Member Nilo    Patient has been less somnolent  Patient is very confused and unsteady, speech is often incoherent
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
## 2020-12-07 NOTE — NURSING NOTE
Pt is calm and cooperative  Pt is pleasantly confused, currently offering no complaints  Pt is seen in the dayroom, scant in conversation  Pt denies all s/s, took her medications whole with water after being seen for a swallow eval  Pt does well with medications one at a time  Pt is to be encouraged to drink fluids, which she has been compliant with

## 2020-12-07 NOTE — TREATMENT TEAM
12/05/20 0900   Team Meeting   Meeting Type Daily Rounds   Team Members Present   Team Members Present Physician;Nurse   Physician Team Member Susanna Gonzalez 284 Team Member Sunita Fitzgerald     Patient was somnolent for much of the previous day

## 2020-12-07 NOTE — CASE MANAGEMENT
Met with pt to complete psychosocial assessment and review intake forms since she was not able to complete interview with SW intern on Friday  Pt was sitting in dayroom watching tv upon approach  Calm, blunted affect, mumbled speech and suspicious  Pt believes that her  put her here and is working with Dr Allayne Siemens to keep her here  Pt did not sign IMM or ROIs  She didn't want to give forms back to CM and required a lot of encouragement to do so  When CM brought a copy of the forms back to her, she did not believe that they were a copy of the originals and became very paranoid about CM  Interview was not able to be completed

## 2020-12-07 NOTE — SPEECH THERAPY NOTE
Speech Language/Pathology  Speech-Language Pathology Bedside Swallow Evaluation        Patient Name: Nader Ruiz    JBAZT'N Date: 12/7/2020     Problem List  Principal Problem:    Bipolar 1 disorder ([facility] )  Active Problems:    Essential hypertension    ANNA MARIE (acute kidney injury) ([facility] )    Medical clearance for psychiatric admission         Past Medical History  Past Medical History:   Diagnosis Date    Bipolar depression ([facility] )     COPD (chronic obstructive pulmonary disease) ([place] [address] )     Essential hypertension     Hypothyroidism     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[place] [address] )        Past Surgical History  Past Surgical History:   Procedure Laterality Date    TONSILLECTOMY      TOTAL HIP ARTHROPLASTY Right        Summary    Pt presents with oropharyngeal swallow that appears WFL  There were no obvious s/s of aspiration, however, upon returning to patient after briefly walking away to speak with nurse, pt was strongly coughing  Pt has been reported to be coughing while taking meds, but pt took meds this morning, one at a time, whole, with water sips by cup, there were no s/s of aspiration and no difficulty taking the pills  ?if pt was taking all the meds at once when she had difficulty? Pt is appropriate to continue current diet, and continue taking meds whole, one at a time, with water sips (unless there are s/s of aspiration, consider giving pills one at a time, whole, in applesauce if pt agreeable)  Pt would benefit from follow up x1-2 to ensure diet and pill toleration        Recommendations:   Diet: regular diet and thin liquids, straws ok  Meds: whole with liquid, one at a time, with sips of water, OR if not tolerating, try whole, one at a time, in applesauce/pudding  Intermittent supervision  Aspiration precautions and compensatory swallowing strategies: upright posture, slow rate of feeding and small bites/sips  Other Recommendations/ considerations: ST to follow up for 1-2 sessions to ensure diet and med toleration      Current Medical Status  Copied from admission:  Joey Cerrato is a 79 y o  female who is originally admitted to the psychiatric service due to psychosis  We are consulted for medical clearance  She was originally admitted to [address] acute care for dilutional, paranoid behaviors had overt psychosis on admission  She also had acute kidney injury which improved with fluid hydration  Patient was evaluated by behavioral health and recommended inpatient psych admission  She she is discharged and admitted to inpatient psychiatric service  She is not answering much questions  As per nursing staff she was sleeping all day yesterday  She is awake today watching TV  No acute events were reported  Order received and chart reviewed  Spoke with RN  Pt has reportedly been coughing when taking meds with liquid, but has refused to allow nursing to crush in applesauce  Pt is confused, speech is garbled  Past medical history:   Please see H&P for details    Special Studies:  CT abdomen-Examination of the chest reveals a normal cardiomediastinal silhouette  Lungs are clear  Social/Education/Vocational Hx:  Pt lives with family    Swallow Information   Current Risks for Dysphagia & Aspiration: Confusion, AMS  Current Symptoms/Concerns: coughing with meds when given whole with water  Current Diet: regular diet and thin liquids   Baseline Diet: regular diet and thin liquids    Baseline Assessment   Behavior/Cognition: alert and confused  Speech/Language Status: able to follow commands inconsistently and limited verbal output, speech is garbled at times  Patient Positioning: upright in chair     Swallow Mechanism Exam   Pt unable to complete oral mech exam due to inability to consistently follow commands  Oral motor function appears grossly intact, no asymmetry  Upper anterior dentition is missing  Cough is strong  Pt is on room air       Consistencies Assessed and Performance Consistencies Administered: thin liquids, soft solids and hard solids, meds given whole, one at a time, with cup sips of water    -pt fed herself breakfast, including scrambled eggs, adams, toasted english muffin with butter, milk and juice by straw  Oral Stage: Adequate bolus retrieval and draw from straw, good lip seal, adequate bite strength into toasted english muffin, fairly prompt mastication, bolus formation and transfer, minimal lingual residue, clears with next swallow, no pocketing  With meds pt quickly transferred them to the pharynx with sips of water  Pharyngeal Stage: Hyolaryngeal elevation observed and palpated, judged to be WNL  Swallows appeared timely  There were no s/s of aspiration during the meal, however, after leaving pt's side briefly to speak to the nurse, pt coughing upon return to table  When pt swallowed the pills, one at a time, whole with water, there were no s/s of aspiration  Esophageal Concerns: none reported      Results Reviewed with: patient and RN   Dysphagia Goals: 1  Pt will tolerate regular diet and thin liquids without s/s of aspiration across all meals and snacks  2  Pt will tolerate meds whole with thin liquid, given one at a time, with no s/s of aspiration  3  Pt will tolerate LRD without s/s of aspiration across all meals and snacks     Discharge recommendation: likely no follow up needed for swallowing    Speech Therapy Prognosis   Prognosis: good    Prognosis Considerations: medical status, prior medical history and therapeutic potential

## 2020-12-07 NOTE — CASE MANAGEMENT
12/07/20 0840   Team Meeting   Meeting Type Daily Rounds   Initial Conference Date 12/07/20   Team Members Present   Team Members Present Physician;Nurse;;Occupational Therapist   Physician Team Member Dr Charles Genao, Etienne Lee   Nursing Team Member [facility] RN   Care Management Team Member [place]   OT Team Member Samantha Jaeger   Patient/Family Present   Patient Present No   Patient's Family Present No     Scant, quiet, refusing crushed meds, having difficulty taking whole meds -- swallow eval today, no agitation, keeps to self, needs to stay hydrated

## 2020-12-07 NOTE — DISCHARGE INSTR - DIET
Regular diet and thin liquids, give pills one at a time, with sips of water  If pt has difficulty with pills, try putting whole in applesauce

## 2020-12-07 NOTE — PROGRESS NOTES
Psychiatry Progress Note    Subjective: Interval History     The patient is sitting out in the common room area this morning  She does not engage much in conversation  When she does speak she is rambling which is difficult to understand  She does report that her  put her here and she is angry with him  A time she has been very fixated on this and feels that Dr Patience Sanders is in a plot with her   She has been keeping to herself  Staff reports that she is getting a swallow evaluation today  She has been refusing to take her medications crushed and has been trying to take them whole  Lithium level 0 8 yesterday and Depakote level 70 3  This was discussed with Dr Patience Sanders and will decrease lithium due to patient's history with kidney issues on lithium      Behavior over the last 24 hours:  unchanged  Sleep: normal  Appetite: fair  Medication side effects: No  ROS: no complaints    Current medications:    Current Facility-Administered Medications:     acetaminophen (TYLENOL) tablet 650 mg, 650 mg, Oral, Q6H PRN, Chioma Faith MD    cholecalciferol (VITAMIN D3) tablet 1,000 Units, 1,000 Units, Oral, Daily, Chioma Faith MD, 1,000 Units at 12/06/20 0919    divalproex sodium (DEPAKOTE SPRINKLE) capsule 500 mg, 500 mg, Oral, HS, Elen Barraza MD, 500 mg at 12/06/20 2108    levothyroxine tablet 25 mcg, 25 mcg, Oral, Every Other Day, Chioma Faith MD, 25 mcg at 12/06/20 0702    levothyroxine tablet 50 mcg, 50 mcg, Oral, Every Other Day, Chioma Faith MD, 50 mcg at 12/06/20 0705    lithium carbonate capsule 300 mg, 300 mg, Oral, QAM, Kathrin Smyth MD, 300 mg at 12/06/20 0919    loratadine (CLARITIN) tablet 10 mg, 10 mg, Oral, Daily, Chioma Faith MD, 10 mg at 12/06/20 0919    LORazepam (ATIVAN) tablet 0 5 mg, 0 5 mg, Oral, Q8H PRN, Chioma Faith MD    melatonin tablet 10 mg, 10 mg, Oral, HS, Chioma Faith MD, 10 mg at 12/06/20 2107    metoprolol succinate (TOPROL-XL) 24 hr tablet 25 mg, 25 mg, Oral, BID, Marielos Zimmerman MD, 25 mg at 12/06/20 2108    OLANZapine (ZyPREXA) tablet 5 mg, 5 mg, Oral, HS, Lanita Merlin, MD    polyethylene glycol (MIRALAX) packet 17 g, 17 g, Oral, Daily, Marielos Zimmerman MD, 17 g at 12/06/20 0919    senna-docusate sodium (SENOKOT S) 8 6-50 mg per tablet 1 tablet, 1 tablet, Oral, HS, Marielos Zimmerman MD, 1 tablet at 12/06/20 2108    temazepam (RESTORIL) capsule 15 mg, 15 mg, Oral, HS, Lanita Merlin, MD    Current Problem List:    Patient Active Problem List   Diagnosis    [facility]    Essential hypertension    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    Hypothyroidism    B[facility] depression [facility] )    Chronic leg pain    Seasonal allergies    Bilateral dry eyes    Age-related osteoporosis without current pathological fracture    ANNA MARIE (acute kidney injury) ([facility] )    Anxiety disorder    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HCC)    [facility] ([facility]    Gait abnormality    History of Clostridium difficile infection    Menopause    Osteopenia    Thickened endometrium    Lithium toxicity    Abdominal distension    Medical clearance for psychiatric admission       Problem list reviewed 12/07/20     Objective:     Vital Signs:  Vitals:    12/06/20 1422 12/06/20 2057 12/06/20 2108 12/07/20 0658   BP: 134/59 165/77 165/77 110/55   BP Location: Right arm Left arm  Right arm   Pulse: 84 84 84 72   Resp: 16 18  16   Temp: 97 9 °F (36 6 °C) 98 1 °F (36 7 °C)  99 °F (37 2 °C)   TempSrc: Tympanic Tympanic  Tympanic   SpO2: 94% 96%  95%   Weight:       Height:             Appearance:  age appropriate and disheveled   Behavior:  guarded   Speech:  dysarthric and soft   Mood:  irritable   Affect:  labile   Thought Process:  loose associations   Thought Content:  delusions  persecutory   Perceptual Disturbances: None   Risk Potential: none   Sensorium:  person and place   Cognition:  recent and remote memory: unable to assess due to lack of cooperation   Consciousness:  alert and awake Attention: attention span and concentration were decreased   Intellect: average   Insight:  limited   Judgment: limited      Motor Activity: no abnormal movements       I/O Past 24 hours:  I/O last 3 completed shifts: In: 840 [P O :840]  Out: -   No intake/output data recorded  Labs:  Reviewed 12/07/20    Progress Toward Goals:  Unchanged    Assessment / Plan:     Bipolar 1 disorder (HCC)    Recommended Treatment:      Medication changes:  1) increase Zyprexa to 5 mg HS  Decrease temazepam to 15 mg HS  Decrease lithium to 150mg daily  Non-pharmacological treatments  1) Continue with group therapy, milieu therapy and occupational therapy  Safety  1) Safety/communication plan established targeting dynamic risk factors above  2) Risks, benefits, and possible side effects of medications explained to patient and patient verbalizes understanding  Counseling / Coordination of Care    Total floor / unit time spent today 20 minutes  Greater than 50% of total time was spent with the patient and / or family counseling and / or coordination of care  A description of the counseling / coordination of care  Patient's Rights, confidentiality and exceptions to confidentiality, use of automated medical record, [facility] staff access to medical record, and consent to treatment reviewed      Patricia Galdamez PA-C

## 2020-12-08 RX ADMIN — LEVOTHYROXINE SODIUM 50 MCG: 25 TABLET ORAL at 06:08

## 2020-12-08 RX ADMIN — Medication 10 MG: at 21:55

## 2020-12-08 RX ADMIN — METOPROLOL SUCCINATE 25 MG: 25 TABLET, EXTENDED RELEASE ORAL at 08:40

## 2020-12-08 RX ADMIN — DIVALPROEX SODIUM 500 MG: 125 CAPSULE, COATED PELLETS ORAL at 21:55

## 2020-12-08 RX ADMIN — SENNOSIDES AND DOCUSATE SODIUM 1 TABLET: 8.6; 5 TABLET ORAL at 21:55

## 2020-12-08 RX ADMIN — CHOLECALCIFEROL TAB 25 MCG (1000 UNIT) 1000 UNITS: 25 TAB at 08:40

## 2020-12-08 RX ADMIN — TEMAZEPAM 15 MG: 15 CAPSULE ORAL at 21:55

## 2020-12-08 RX ADMIN — LORATADINE 10 MG: 10 TABLET ORAL at 08:40

## 2020-12-08 RX ADMIN — OLANZAPINE 5 MG: 5 TABLET, FILM COATED ORAL at 21:55

## 2020-12-08 RX ADMIN — LITHIUM CARBONATE 150 MG: 150 CAPSULE, GELATIN COATED ORAL at 08:40

## 2020-12-08 RX ADMIN — METOPROLOL SUCCINATE 25 MG: 25 TABLET, EXTENDED RELEASE ORAL at 21:56

## 2020-12-08 RX ADMIN — POLYETHYLENE GLYCOL 3350 17 G: 17 POWDER, FOR SOLUTION ORAL at 08:40

## 2020-12-08 RX ADMIN — LEVOTHYROXINE SODIUM 25 MCG: 25 TABLET ORAL at 06:08

## 2020-12-08 NOTE — PLAN OF CARE
Problem: Alteration in Thoughts and Perception  Goal: Attend and participate in unit activities, including therapeutic, recreational, and educational groups  Description: Interventions:  -Encourage Visitation and family involvement in care  Outcome: Progressing     Problem: Ineffective Coping  Goal: Cooperates with admission process  Description: Interventions:   - Complete admission process  Outcome: Progressing  Goal: Identifies ineffective coping skills  Outcome: Progressing  Goal: Identifies healthy coping skills  Outcome: Progressing  Goal: Demonstrates healthy coping skills  Outcome: Progressing     Problem: Prexisting or High Potential for Compromised Skin Integrity  Goal: Skin integrity is maintained or improved  Description: INTERVENTIONS:  - Identify patients at risk for skin breakdown  - Assess and monitor skin integrity  - Assess and monitor nutrition and hydration status  - Monitor labs   - Assess for incontinence   - Turn and reposition patient  - Assist with mobility/ambulation  - Relieve pressure over bony prominences  - Avoid friction and shearing  - Provide appropriate hygiene as needed including keeping skin clean and dry  - Evaluate need for skin moisturizer/barrier cream  - Collaborate with interdisciplinary team   - Patient/family teaching  - Consider wound care consult   Outcome: Not Progressing     Problem: Alteration in Thoughts and Perception  Goal: Treatment Goal: Gain control of psychotic behaviors/thinking, reduce/eliminate presenting symptoms and demonstrate improved reality functioning upon discharge  Outcome: Not Progressing  Goal: Verbalize thoughts and feelings  Description: Interventions:  - Promote a nonjudgmental and trusting relationship with the patient through active listening and therapeutic communication  - Assess patient's level of functioning, behavior and potential for risk  - Engage patient in 1 on 1 interactions  - Encourage patient to express fears, feelings, frustrations, and discuss symptoms    - [place] patient to reality, help patient recognize reality-based thinking   - Administer medications as ordered and assess for potential side effects  - Provide the patient education related to the signs and symptoms of the illness and desired effects of prescribed medications  Outcome: Not Progressing  Goal: Refrain from acting on delusional thinking/internal stimuli  Description: Interventions:  - Monitor patient closely, per order   - Utilize least restrictive measures   - Set reasonable limits, give positive feedback for acceptable   - Administer medications as ordered and monitor of potential side effects  Outcome: Not Progressing  Goal: Agree to be compliant with medication regime, as prescribed and report medication side effects  Description: Interventions:  - Offer appropriate PRN medication and supervise ingestion; conduct AIMS, as needed   Outcome: Not Progressing

## 2020-12-08 NOTE — PROGRESS NOTES
Psychiatry Progress Note    Subjective: Interval History     Patient is sitting out in the common room area this morning in a [place] chair  Patient is a bit guarded during discussion  She also has limited insight as to why she is in the hospital   Staff reports she has been scant in conversation  She is confused at times  Case management met with patient yesterday  She stated that her  put her here and is working with Dr Charles Genao to keep her here  She does continue to be paranoid  Patient reports this morning that she has been fighting with her  often and that is why he put her here  Patient is denying hallucinations or suicidal ideation      Behavior over the last 24 hours:  unchanged  Sleep: normal  Appetite: fair  Medication side effects: No  ROS: no complaints    Current medications:    Current Facility-Administered Medications:     acetaminophen (TYLENOL) tablet 650 mg, 650 mg, Oral, Q6H PRN, Ilan Hamm MD    cholecalciferol (VITAMIN D3) tablet 1,000 Units, 1,000 Units, Oral, Daily, Ilan Hamm MD, 1,000 Units at 12/07/20 0911    divalproex sodium (DEPAKOTE SPRINKLE) capsule 500 mg, 500 mg, Oral, HS, Mariusz Easley MD, 500 mg at 12/07/20 2116    levothyroxine tablet 25 mcg, 25 mcg, Oral, Every Other Day, Ilan Hamm MD, 25 mcg at 12/08/20 0608    levothyroxine tablet 50 mcg, 50 mcg, Oral, Every Other Day, Ilan Hamm MD, 50 mcg at 12/08/20 0608    lithium carbonate capsule 150 mg, 150 mg, Oral, Misty STACK PA-C    loratadine (CLARITIN) tablet 10 mg, 10 mg, Oral, Daily, Ilan Hamm MD, 10 mg at 12/07/20 0911    LORazepam (ATIVAN) tablet 0 5 mg, 0 5 mg, Oral, Q8H PRN, Ilan Hamm MD    melatonin tablet 10 mg, 10 mg, Oral, HS, Ilan Hamm MD, 10 mg at 12/07/20 2116    metoprolol succinate (TOPROL-XL) 24 hr tablet 25 mg, 25 mg, Oral, BID, Ilan Hamm MD, 25 mg at 12/07/20 2117    OLANZapine (ZyPREXA) tablet 5 mg, 5 mg, Oral, HS, Laura Gan, MD, 5 mg at 12/07/20 2117    polyethylene glycol (MIRALAX) packet 17 g, 17 g, Oral, Daily, Lio Connolly MD, 17 g at 12/07/20 0911    senna-docusate sodium (SENOKOT S) 8 6-50 mg per tablet 1 tablet, 1 tablet, Oral, HS, Lio Connolly MD, 1 tablet at 12/07/20 2119    temazepam (RESTORIL) capsule 15 mg, 15 mg, Oral, HS, Tim Winkler MD, 15 mg at 12/07/20 2117    Current Problem List:    Patient Active Problem List   Diagnosis    [facility]    Essential hypertension    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    Hypothyroidism    B[facility] depression [facility]    Chronic leg pain    Seasonal allergies    Bilateral dry eyes    Age-related osteoporosis without current pathological fracture    ANNA MARIE (acute kidney injury) ([facility] )    Anxiety disorder    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[facility] )    Dementia ([facility] )    Gait abnormality    History of Clostridium difficile infection    Menopause    Osteopenia    Thickened endometrium    Lithium toxicity    Abdominal distension    Medical clearance for psychiatric admission       Problem list reviewed 12/08/20     Objective:     Vital Signs:  Vitals:    12/07/20 1305 12/07/20 1440 12/07/20 2113 12/08/20 0637   BP:  148/66 133/60 152/65   BP Location:  Left arm Right arm Right arm   Pulse:  85 90 79   Resp:  16 16 16   Temp:  99 2 °F (37 3 °C) 98 5 °F (36 9 °C) (!) 97 1 °F (36 2 °C)   TempSrc:  Tympanic Temporal Temporal   SpO2:  96% 98% 95%   Weight: 59 5 kg (131 lb 2 8 oz)      Height: 5' 4" (1 626 m)            Appearance:  age appropriate and disheveled   Behavior:  guarded   Speech:  soft   Mood:  constricted   Affect:  labile   Thought Process:  loose associations   Thought Content:  delusions  persecutory   Perceptual Disturbances: None   Risk Potential: none   Sensorium:  person and place   Cognition:  recent and remote memory: unable to assess due to lack of cooperation   Consciousness:  alert and awake    Attention: attention span and concentration were decreased Intellect: average   Insight:  limited   Judgment: limited      Motor Activity: no abnormal movements       I/O Past 24 hours:  I/O last 3 completed shifts: In: 1171 [P O :1040]  Out: -   No intake/output data recorded  Labs:  Reviewed 12/08/20    Progress Toward Goals:  Unchanged    Assessment / Plan:     [facility]    Recommended Treatment:      Medication changes:  1) continue current medication regimen  Multiple medication adjustments made yesterday  Lithium level Thursday  Non-pharmacological treatments  1) Continue with group therapy, milieu therapy and occupational therapy  Safety  1) Safety/communication plan established targeting dynamic risk factors above  2) Risks, benefits, and possible side effects of medications explained to patient and patient verbalizes understanding  Counseling / Coordination of Care    Total floor / unit time spent today 20 minutes  Greater than 50% of total time was spent with the patient and / or family counseling and / or coordination of care  A description of the counseling / coordination of care  Patient's Rights, confidentiality and exceptions to confidentiality, use of automated medical record, [facility] staff access to medical record, and consent to treatment reviewed      Hazel Means PA-C

## 2020-12-08 NOTE — NURSING NOTE
Patient is pleasant and little guarded   Patient is compliant with medications  Patient is 2 assist and nurse spoke with the  stated he is her caregiver and was concern that he will be not able to take care of her if she will be not able to stand up and walk to the bathroom or do her ADL's  PT and OT consult scheduled  Pt  Was encouraged to drink fluids

## 2020-12-08 NOTE — PROGRESS NOTES
Pt attended 2/3 groups  Pt guarded and refused materials during group  Pt did have supportive peers who encouraged pt participation level  Pt quiet and able to stay for duration  12/08/20 1330   Activity/Group Checklist   Group Other (Comment)  (positive reflection)   Attendance Attended   Attendance Duration (min) 46-60   Interactions Other (Comment)  (scant conversation)   Affect/Mood Calm   Goals Achieved Identified feelings; Able to listen to others; Able to engage in interactions; Able to recieve feedback

## 2020-12-08 NOTE — PLAN OF CARE
Pt engaged in two of three groups with intermittent alertness    Problem: Ineffective Coping  Goal: Participates in unit activities  Description: Interventions:  - Provide therapeutic environment   - Provide required programming   - Redirect inappropriate behaviors   Outcome: Progressing

## 2020-12-08 NOTE — CASE MANAGEMENT
12/08/20 0840   Team Meeting   Meeting Type Daily Rounds   Initial Conference Date 12/08/20   Team Members Present   Team Members Present Physician;Nurse;;Occupational Therapist   Physician Team Member Nany King   Nursing Team Member [facility] Management Team Member [place]   OT Team Member Jayson Padilla   Patient/Family Present   Patient Present No   Patient's Family Present No     2 person assist with ADLs, slept well, pleasantly confused, paranoid about  putting her in hospital and working against her with MD, med compliant, calm, cooperative, Lithium and Temazepam decreased yesterday, Zyprexa increased last night, lithium level on Thurs

## 2020-12-09 PROCEDURE — 97163 PT EVAL HIGH COMPLEX 45 MIN: CPT

## 2020-12-09 PROCEDURE — 97535 SELF CARE MNGMENT TRAINING: CPT

## 2020-12-09 PROCEDURE — 97167 OT EVAL HIGH COMPLEX 60 MIN: CPT

## 2020-12-09 RX ADMIN — LORATADINE 10 MG: 10 TABLET ORAL at 08:03

## 2020-12-09 RX ADMIN — Medication 10 MG: at 21:16

## 2020-12-09 RX ADMIN — CHOLECALCIFEROL TAB 25 MCG (1000 UNIT) 1000 UNITS: 25 TAB at 08:03

## 2020-12-09 RX ADMIN — POLYETHYLENE GLYCOL 3350 17 G: 17 POWDER, FOR SOLUTION ORAL at 08:03

## 2020-12-09 RX ADMIN — DIVALPROEX SODIUM 500 MG: 125 CAPSULE, COATED PELLETS ORAL at 21:17

## 2020-12-09 RX ADMIN — LITHIUM CARBONATE 150 MG: 150 CAPSULE, GELATIN COATED ORAL at 08:03

## 2020-12-09 RX ADMIN — TEMAZEPAM 15 MG: 15 CAPSULE ORAL at 21:17

## 2020-12-09 RX ADMIN — METOPROLOL SUCCINATE 25 MG: 25 TABLET, EXTENDED RELEASE ORAL at 21:16

## 2020-12-09 RX ADMIN — SENNOSIDES AND DOCUSATE SODIUM 1 TABLET: 8.6; 5 TABLET ORAL at 21:17

## 2020-12-09 RX ADMIN — OLANZAPINE 5 MG: 5 TABLET, FILM COATED ORAL at 21:17

## 2020-12-09 NOTE — PROGRESS NOTES
Pt did not attend any groups when prompted or offered        12/09/20 1000   Activity/Group Checklist   Group Other (Comment)  (short term problem solving)   Attendance Did not attend

## 2020-12-09 NOTE — PHYSICAL THERAPY NOTE
Physical Therapy Evaluation    Patient's Name: Orlando Rahman    Admitting Diagnosis  Major depressive disorder, single episode, unspecified [F32 9]    Problem List  Patient Active Problem List   Diagnosis    [facility] 1 disorder ([facility] )    Essential hypertension    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[place] [address] )    Hypothyroidism    Bipolar depression ([facility] )    Chronic leg pain    Seasonal allergies    Bilateral dry eyes    Age-related osteoporosis without current pathological fracture    ANNA MARIE (acute kidney injury) ([facility] )    Anxiety disorder    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[facility] )    Dementia ([place] [address] )    Gait abnormality    History of Clostridium difficile infection    Menopause    Osteopenia    Thickened endometrium    Lithium toxicity    Abdominal distension    Medical clearance for psychiatric admission       Past Medical History  Past Medical History:   Diagnosis Date    Bipolar depression ([place] [address] )     COPD (chronic obstructive pulmonary disease) ([place] [address] )     Essential hypertension     Hypothyroidism     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HCC)        Past Surgical History  Past Surgical History:   Procedure Laterality Date    TONSILLECTOMY      TOTAL HIP ARTHROPLASTY Right        Recent Imaging  No orders to display       Recent Vital Signs  Vitals:    12/08/20 2129 12/09/20 0600 12/09/20 0642 12/09/20 0706   BP: 143/73  (!) 100/48    BP Location: Right arm  Right arm    Pulse: 82  82    Resp: 16  16    Temp: 98 2 °F (36 8 °C)  98 4 °F (36 9 °C)    TempSrc: Temporal  Temporal    SpO2: 96%  (!) 89%    Weight:  59 6 kg (131 lb 6 3 oz)  58 9 kg (129 lb 14 4 oz)   Height:            12/09/20 1055   PT Last Visit   PT Visit Date 12/09/20   Note Type   Note type Evaluation   Pain Assessment   Pain Assessment Tool Pain Assessment not indicated - pt denies pain   Home Living   Type of Home House   Home Layout Multi-level;Bed/bath upstairs   Home Equipment Walker   Prior Function   Level of [place] Independent with ADLs and functional mobility Lives With Spouse   Receives Help From Family   ADL Assistance Independent   Restrictions/Precautions   Weight Bearing Precautions Per Order No   Other Precautions Chair Alarm; Bed Alarm;Cognitive; Fall Risk;Impulsive   General   Family/Caregiver Present No   Cognition   Arousal/Participation Responsive   Attention Attends with cues to redirect   Orientation Level Oriented to person;Oriented to place;Oriented to situation   Memory Decreased recall of recent events;Decreased recall of precautions   Following Commands Follows one step commands without difficulty   RLE Assessment   RLE Assessment   (3+/5)   LLE Assessment   LLE Assessment   (3+/5)   Coordination   Movements are Fluid and Coordinated 0   Coordination and Movement Description stiff delayed motor planning   Sensation WFL   Light Touch   RLE Light Touch Grossly intact   LLE Light Touch Grossly intact   Transfers   Sit to Stand 3  Moderate assistance   Additional items Assist x 1; Armrests; Increased time required;Verbal cues   Stand to Sit 3  Moderate assistance   Additional items Assist x 1; Armrests; Increased time required;Verbal cues   Toilet transfer 3  Moderate assistance   Additional items Assist x 1; Increased time required;Verbal cues   Additional Comments with RW   Ambulation/Elevation   Gait pattern Excessively slow; Short stride; Shuffling;Decreased foot clearance;Narrow WARREN   Gait Assistance 3  Moderate assist   Additional items Assist x 1;Verbal cues; Tactile cues   Assistive Device Rolling walker   Distance 30ft   Balance   Static Sitting Fair +   Dynamic Sitting Fair   Static Standing Fair -   Dynamic Standing Poor +   Ambulatory Poor +   Endurance Deficit   Endurance Deficit Yes   Activity Tolerance   Activity Tolerance Patient tolerated treatment well   Nurse Made Aware spoke to RN   Assessment   Prognosis Fair   Problem List Decreased strength;Decreased range of motion;Decreased endurance; Impaired balance;Decreased mobility; Decreased coordination; Impaired judgement;Decreased safety awareness;Decreased cognition   Barriers to Discharge Inaccessible home environment   Goals   Patient Goals to go to the bathroom   STG Expiration Date 12/23/20   Short Term Goal #1 Pt will complete bed mobility, transfers and ambulation with RW, and stairs as needed at mod I level to return home  PT Treatment Day 0   Plan   Treatment/Interventions Functional transfer training;LE strengthening/ROM; Therapeutic exercise;Elevations;Cognitive reorientation;Patient/family training; Endurance training;Equipment eval/education; Bed mobility;Gait training;Spoke to nursing;Spoke to case management;OT   PT Frequency 2-3x/wk   Recommendation   PT Discharge Recommendation Post-Acute Rehabilitation Services   Equipment Recommended Walker   PT - OK to Discharge Yes   Additional Comments to rehab when medically cleared   [address] Mobility Inpatient   Turning in Bed Without Bedrails 3   Lying on Back to Sitting on Edge of Flat Bed 3   Moving Bed to Chair 2   Standing Up From Chair 2   Walk in Room 2   Climb 3-5 Stairs 2   Basic Mobility Inpatient Raw Score 14   Basic Mobility Standardized Score 35 55         ASSESSMENT                                                                                                                     Christel Chase is a 79 y o  female admitted to [facility] on 12/3/2020 for Bipolar 1 disorder ([facility] )  Pt  has a past medical history of Bipolar depression ([facility] ), COPD (chronic obstructive pulmonary disease) ([facility] ), Essential hypertension, Hypothyroidism, and 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[facility] )    PT was consulted and pt was seen on 12/9/2020 for mobility assessment and d/c planning  Pt presents seated in recliner in common room alert and agreeable to therapy  She is reporting no pain at this time  She demonstrates fair strength in BLEs, weakness limites transfers and ambulation  She has intact sensation to light touch however reports tingling in legs and feet   She is somewhat retropulsive when first standing but this improves with more time upright  Pt is currently functioning at a moderate assistance x1 level for transfers, moderate assistance x1 level for ambulation with Rolling Walker  Pt will benefit from continued skilled IP PT to address the above mentioned impairments  in order to maximize recovery and increase functional independence when completing mobility and ADLs  Currently PT recommendations for DME include RW  At this time PT recommendations for d/c are short term inpt rehab        Seth Beck PT, DPT

## 2020-12-09 NOTE — PLAN OF CARE
Not engaged in structured groups today    Problem: Ineffective Coping  Goal: Participates in unit activities  Description: Interventions:  - Provide therapeutic environment   - Provide required programming   - Redirect inappropriate behaviors   Outcome: Not Progressing

## 2020-12-09 NOTE — NURSING NOTE
Patient visible in dayroom and able to make needs known  Denies all s/s and was medication compliant   Will continue to monitor

## 2020-12-09 NOTE — PROGRESS NOTES
Psychiatry Progress Note    Subjective: Interval History     Patient is sitting out on the unit this morning eating breakfast  Staff reports she has been visible out in the day room  She has been compliant with medication and meals  Patient reports that she has not talked to her  recently  She is not sure if she wants to talk to him  Recently patient has been paranoid and believes her  put her here to get rid of her  Patient does not understand why she is in the hospital   Informed patient she can use the phone  Patient states she is not sure how to do this  Will discuss with case management to see if they can help her call today  Patient denies feeling depressed this morning  She reports she slept well  Staff states she attended 2/3 groups yesterday  She refused materials during group  Patient recently had medication adjustments  She will have a lithium level tomorrow      Behavior over the last 24 hours:  unchanged  Sleep: normal  Appetite: fair  Medication side effects: No  ROS: no complaints    Current medications:    Current Facility-Administered Medications:     acetaminophen (TYLENOL) tablet 650 mg, 650 mg, Oral, Q6H PRN, Beatriz Rockwell MD    cholecalciferol (VITAMIN D3) tablet 1,000 Units, 1,000 Units, Oral, Daily, Beatriz Rockwell MD, 1,000 Units at 12/09/20 0803    divalproex sodium (DEPAKOTE SPRINKLE) capsule 500 mg, 500 mg, Oral, HS, Mikala Lozoya MD, 500 mg at 12/08/20 2155    levothyroxine tablet 25 mcg, 25 mcg, Oral, Every Other Day, Beatriz Rockwell MD, 25 mcg at 12/08/20 0608    levothyroxine tablet 50 mcg, 50 mcg, Oral, Every Other Day, Beatriz Rockwell MD, 50 mcg at 12/08/20 0608    lithium carbonate capsule 150 mg, 150 mg, Oral, Misty STACK PA-C, 150 mg at 12/09/20 0803    loratadine (CLARITIN) tablet 10 mg, 10 mg, Oral, Daily, Beatriz Rockwell MD, 10 mg at 12/09/20 0803    LORazepam (ATIVAN) tablet 0 5 mg, 0 5 mg, Oral, Q8H PRN, MD Thania Jimenez melatonin tablet 10 mg, 10 mg, Oral, HS, Eugene Monk MD, 10 mg at 12/08/20 2155    metoprolol succinate (TOPROL-XL) 24 hr tablet 25 mg, 25 mg, Oral, BID, Eugene Monk MD, 25 mg at 12/08/20 2156    OLANZapine (ZyPREXA) tablet 5 mg, 5 mg, Oral, HS, Maddison Loera MD, 5 mg at 12/08/20 2155    polyethylene glycol (MIRALAX) packet 17 g, 17 g, Oral, Daily, Eugene Monk MD, 17 g at 12/09/20 0803    senna-docusate sodium (SENOKOT S) 8 6-50 mg per tablet 1 tablet, 1 tablet, Oral, HS, Eugene Monk MD, 1 tablet at 12/08/20 2155    temazepam (RESTORIL) capsule 15 mg, 15 mg, Oral, HS, Maddison Loera MD, 15 mg at 12/08/20 2155    Current Problem List:    Patient Active Problem List   Diagnosis    [facility] )    Essential hypertension    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[facility] )    Hypothyroidism    Bipolar depression ([facility] )    Chronic leg pain    Seasonal allergies    Bilateral dry eyes    Age-related osteoporosis without current pathological fracture    ANNA MARIE (acute kidney injury) ([facility] )    Anxiety disorder    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[facility] )    Dementia ([facility] )    Gait abnormality    History of Clostridium difficile infection    Menopause    Osteopenia    Thickened endometrium    Lithium toxicity    Abdominal distension    Medical clearance for psychiatric admission       Problem list reviewed 12/09/20     Objective:     Vital Signs:  Vitals:    12/08/20 1451 12/08/20 2129 12/09/20 0642 12/09/20 0706   BP: 97/55 143/73 (!) 100/48    BP Location: Left arm Right arm Right arm    Pulse: 85 82 82    Resp: 16 16 16    Temp: 98 7 °F (37 1 °C) 98 2 °F (36 8 °C) 98 4 °F (36 9 °C)    TempSrc: Temporal Temporal Temporal    SpO2: 95% 96% (!) 89%    Weight:    58 9 kg (129 lb 14 4 oz)   Height:             Appearance:  age appropriate and disheveled   Behavior:  guarded   Speech:  soft   Mood:  constricted   Affect:  labile   Thought Process:  loose associations   Thought Content:  delusions  persecutory   Perceptual Disturbances: None   Risk Potential: none   Sensorium:  person and place   Cognition:  recent and remote memory: unable to assess due to lack of cooperation   Consciousness:  alert and awake    Attention: attention span and concentration were decreased   Intellect: average   Insight:  limited   Judgment: limited      Motor Activity: no abnormal movements       I/O Past 24 hours:  I/O last 3 completed shifts: In: 840 [P O :840]  Out: -   I/O this shift:  In: 660 [P O :660]  Out: -         Labs:  Reviewed 12/09/20    Progress Toward Goals:  Unchanged    Assessment / Plan:     Bipolar 1 disorder ([facility] )    Recommended Treatment:      Medication changes:  1) continue current medication regimen  Lithium level Thursday  Non-pharmacological treatments  1) Continue with group therapy, milieu therapy and occupational therapy  Safety  1) Safety/communication plan established targeting dynamic risk factors above  2) Risks, benefits, and possible side effects of medications explained to patient and patient verbalizes understanding  Counseling / Coordination of Care    Total floor / unit time spent today 20 minutes  Greater than 50% of total time was spent with the patient and / or family counseling and / or coordination of care  A description of the counseling / coordination of care  Patient's Rights, confidentiality and exceptions to confidentiality, use of automated medical record, [facility] staff access to medical record, and consent to treatment reviewed      Blaise Boles PA-C

## 2020-12-09 NOTE — PROGRESS NOTES
12/09/20 0833   Team Meeting   Meeting Type Daily Rounds   Initial Conference Date 12/09/20   Team Members Present   Team Members Present Physician;Nurse;;   Physician Team Member Yfn Tanner [place]   Nursing Team Member [facility] Management Team Member Jose Braun   Social Work Team Member Marlon   Patient/Family Present   Patient Present No   Patient's Family Present No     2 assist, PT/OT eval ordered, med compliant, denying s/s, forgetful at times, slept all night, Lithium level tomorrow

## 2020-12-09 NOTE — PLAN OF CARE
Problem: PHYSICAL THERAPY ADULT  Goal: Performs mobility at highest level of function for planned discharge setting  See evaluation for individualized goals  Description: Treatment/Interventions: Functional transfer training, LE strengthening/ROM, Therapeutic exercise, Elevations, Cognitive reorientation, Patient/family training, Endurance training, Equipment eval/education, Bed mobility, Gait training, Spoke to nursing, Spoke to case management, OT  Equipment Recommended: Guillermo Raymond       See flowsheet documentation for full assessment, interventions and recommendations  Note: Prognosis: Fair  Problem List: Decreased strength, Decreased range of motion, Decreased endurance, Impaired balance, Decreased mobility, Decreased coordination, Impaired judgement, Decreased safety awareness, Decreased cognition     Barriers to Discharge: Inaccessible home environment     PT Discharge Recommendation: [address]     PT - OK to Discharge: Yes    See flowsheet documentation for full assessment

## 2020-12-09 NOTE — OCCUPATIONAL THERAPY NOTE
Occupational Therapy Evaluation & Treatment Note     Patient Name: Mallory Benson  SXDOM'D Date: 12/9/2020  Problem List  Principal Problem:    Bipolar 1 disorder ([place] [address] )  Active Problems:    Essential hypertension    ANNA MARIE (acute kidney injury) ([place] [address] )    Medical clearance for psychiatric admission    Past Medical History  Past Medical History:   Diagnosis Date    Bipolar depression ([place] [address] )     COPD (chronic obstructive pulmonary disease) ([place] [address] )     Essential hypertension     Hypothyroidism     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[place] [address] )      Past Surgical History  Past Surgical History:   Procedure Laterality Date    TONSILLECTOMY      TOTAL HIP ARTHROPLASTY Right              12/09/20 1051   OT Last Visit   OT Visit Date 12/09/20   Note Type   Note type Evaluation   Restrictions/Precautions   Weight Bearing Precautions Per Order No   Other Precautions Chair Alarm; Bed Alarm;Cognitive; Impulsive; Fall Risk   Pain Assessment   Pain Assessment Tool Pain Assessment not indicated - pt denies pain   Home Living   Type of [address] Multi-level;Bed/bath upstairs   Home Equipment Walker   Prior Function   Level of [place] Independent with ADLs and functional mobility   Lives With Spouse   Receives Help From Family   ADL Assistance Independent   Comments Pt is a questionable historian- however reports that she is typically independent with ADLs and able to get around her house with a walker  Subjective   Subjective "I don't want to fall"   ADL   Grooming Assistance 5  Supervision/Setup   Grooming Deficit Wash/dry hands   UB Bathing Assistance 4  Minimal Assistance   LB Bathing Assistance 2  Maximal Assistance    [address] 2  Maximal Assistance   LB Dressing Deficit Thread RLE into underwear; Thread LLE into underwear; Thread LLE into pants; Thread RLE into pants;Pull up over hips   Toileting Assistance  2  Maximal Assistance   Toileting Deficit Clothing management up;Clothing management down;Perineal hygiene   Transfers   Sit to Stand 3  Moderate assistance   Additional items Assist x 1; Increased time required;Verbal cues   Stand to Sit 3  Moderate assistance   Additional items Assist x 1; Increased time required;Verbal cues   Toilet transfer 3  Moderate assistance   Additional items Assist x 1; Increased time required;Verbal cues   Functional Mobility   Functional Mobility 4  Minimal assistance   Additional Comments x 1, short distances, additional tactile cues for walker management    Additional items Rolling walker   Balance   Static Sitting Fair +   Dynamic Sitting Fair   Static Standing Fair -   Dynamic Standing Poor +   Ambulatory Poor +   Activity Tolerance   Activity Tolerance Patient tolerated treatment well   RUE Assessment   RUE Assessment WFL   LUE Assessment   LUE Assessment WFL   Cognition   Arousal/Participation Alert; Cooperative   Attention Attends with cues to redirect   Orientation Level Oriented to person;Oriented to place; Disoriented to situation   Memory Decreased recall of precautions;Decreased recall of recent events   Following Commands Follows one step commands without difficulty   Comments Pt appropriate and cooperative  Pt with noted task perseveration, becomes irritable with cues to continue on with tasks  Pt with poor safety awareness, problem-solving, and judgment  Assessment   Limitation Decreased ADL status; Decreased UE strength;Decreased Safe judgement during ADL;Decreased cognition;Decreased endurance;Decreased high-level ADLs   Prognosis Good   Assessment   Pt is a 79 y o  female seen for OT evaluation s/p admit to [address] on 12/3/2020 w/ Bipolar 1 disorder ([facility] )  See above for extensive list of comorbidities affecting Pt's functional performance at time of assessment   Personal factors affecting Pt at time of IE include:limited home support, behavioral pattern, difficulty performing ADLS, difficulty performing IADLS , limited insight into deficits, compliance, health management  and environment  Prior to admission, Pt was reportedly independent with ADLs and ambulated with a walker  Upon evaluation: Pt requires modA x 1 for transfers and ambulation, noted short step length/decreased stride and difficulty managing walker requiring additional tactile cues  Pt with increased anxiety and fear of falling with upright activity  Pt also with noted motor perseveration with functional tasks, becoming irritable with cues for continued task completion  The following deficits impact occupational performance: weakness, decreased strength, decreased balance, decreased tolerance, impaired attention, impaired sequencing, impaired problem solving, decreased safety awareness, impaired interpersonal skills and decreased coping skills  Pt to benefit from continued skilled OT services while in the hospital to address deficits as defined above and maximize level of functional independence w ADL's and functional mobility  Occupational performance areas to address include: grooming, bathing/shower, toilet hygiene, dressing, health maintenance, functional mobility and clothing management  From OT standpoint, recommendation at time of d/c would be post acute rehab  Goals   STG Time Frame   (2 weeks )   Plan   Treatment Interventions ADL retraining;Functional transfer training;UE strengthening/ROM; Endurance training;Continued evaluation; Activityengagement; Energy conservation   Short Term Goals  1  Pt will complete sit-->stand transfers with supervision  2  Pt will complete toilet hygiene with supervision  3  Pt will complete formal cognitive assessment  Goal Expiration Date 12/23/20   OT Treatment Day 1   OT Frequency 2-3x/wk   Additional Treatment Session   Start Time 0991   End Time 1130   Treatment Assessment Pt seen for OT txt  Pt requesting to use the bathroom, assisted with bathroom mobility Marc    Pt already incontinent of bowel, required maxA for clothing management and hygiene  Pt with poor attention to task, problem-solving, and sequencing with ADLs- wanted to stand and ambulate prior to donning underwear/pants when finished toileting  Pt required assist with all ADLs at this time  Pt would benefit from continued OT services to further address deficits with standing balance, endurance, safety, strength, and cognition  Continue OT POC      Recommendation   OT Discharge Recommendation Post-Acute Rehabilitation Services

## 2020-12-09 NOTE — NURSING NOTE
Awake and visible in day room  Pt reported that she slept well last night  Pt denies any depression or anxiety,denies any SI/HI/AH/VH  Med compliant  No distress noted  Will continue to monitor closely and provide support

## 2020-12-09 NOTE — PLAN OF CARE
Problem: OCCUPATIONAL THERAPY ADULT  Goal: Performs self-care activities at highest level of function for planned discharge setting  See evaluation for individualized goals  Description: Treatment Interventions: ADL retraining, Functional transfer training, UE strengthening/ROM, Endurance training, Continued evaluation, Activityengagement, Energy conservation          See flowsheet documentation for full assessment, interventions and recommendations  Note: Limitation: Decreased ADL status, Decreased UE strength, Decreased Safe judgement during ADL, Decreased cognition, Decreased endurance, Decreased high-level ADLs  Prognosis: Good  Assessment: Pt is a 79 y o  female seen for OT evaluation s/p [facility] 12/3/2020 w/ Bipolar 1 disorder ([facility] )  See above for extensive list of comorbidities affecting Pt's functional performance at time of assessment  Personal factors affecting Pt at time of IE include:limited home support, behavioral pattern, difficulty performing ADLS, difficulty performing IADLS , limited insight into deficits, compliance, health management  and environment  Prior to admission, Pt was reportedly independent with ADLs and ambulated with a walker  Upon evaluation: Pt requires modA x 1 for transfers and ambulation, noted short step length/decreased stride and difficulty managing walker requiring additional tactile cues  Pt with increased anxiety and fear of falling with upright activity  Pt also with noted motor perseveration with functional tasks, becoming irritable with cues for continued task completion  The following deficits impact occupational performance: weakness, decreased strength, decreased balance, decreased tolerance, impaired attention, impaired sequencing, impaired problem solving, decreased safety awareness, impaired interpersonal skills and decreased coping skills   Pt to benefit from continued skilled OT services while in the hospital to address deficits as defined above and maximize level of functional independence w ADL's and functional mobility  Occupational performance areas to address include: grooming, bathing/shower, toilet hygiene, dressing, health maintenance, functional mobility and clothing management  From OT standpoint, recommendation at time of d/c would be post acute rehab         OT Discharge Recommendation: Post-Acute Rehabilitation Services

## 2020-12-10 LAB
ANION GAP SERPL CALCULATED.3IONS-SCNC: 4 MMOL/L (ref 5–14)
BUN SERPL-MCNC: 48 MG/DL (ref 5–25)
CALCIUM SERPL-MCNC: 9 MG/DL (ref 8.4–10.2)
CHLORIDE SERPL-SCNC: 104 MMOL/L (ref 97–108)
CO2 SERPL-SCNC: 31 MMOL/L (ref 22–30)
CREAT SERPL-MCNC: 1.06 MG/DL (ref 0.6–1.2)
GFR SERPL CREATININE-BSD FRML MDRD: 49 ML/MIN/1.73SQ M
GLUCOSE P FAST SERPL-MCNC: 98 MG/DL (ref 70–99)
GLUCOSE SERPL-MCNC: 98 MG/DL (ref 70–99)
LITHIUM SERPL-SCNC: 0.7 MMOL/L (ref 0.6–1.2)
POTASSIUM SERPL-SCNC: 4.7 MMOL/L (ref 3.6–5)
SODIUM SERPL-SCNC: 139 MMOL/L (ref 137–147)

## 2020-12-10 PROCEDURE — 80178 ASSAY OF LITHIUM: CPT | Performed by: PHYSICIAN ASSISTANT

## 2020-12-10 PROCEDURE — 92526 ORAL FUNCTION THERAPY: CPT

## 2020-12-10 PROCEDURE — 80048 BASIC METABOLIC PNL TOTAL CA: CPT | Performed by: PHYSICIAN ASSISTANT

## 2020-12-10 RX ADMIN — OLANZAPINE 5 MG: 5 TABLET, FILM COATED ORAL at 21:52

## 2020-12-10 RX ADMIN — SENNOSIDES AND DOCUSATE SODIUM 1 TABLET: 8.6; 5 TABLET ORAL at 21:52

## 2020-12-10 RX ADMIN — TEMAZEPAM 15 MG: 15 CAPSULE ORAL at 21:52

## 2020-12-10 RX ADMIN — POLYETHYLENE GLYCOL 3350 17 G: 17 POWDER, FOR SOLUTION ORAL at 09:17

## 2020-12-10 RX ADMIN — CHOLECALCIFEROL TAB 25 MCG (1000 UNIT) 1000 UNITS: 25 TAB at 09:15

## 2020-12-10 RX ADMIN — METOPROLOL SUCCINATE 25 MG: 25 TABLET, EXTENDED RELEASE ORAL at 21:52

## 2020-12-10 RX ADMIN — LEVOTHYROXINE SODIUM 25 MCG: 25 TABLET ORAL at 06:33

## 2020-12-10 RX ADMIN — Medication 10 MG: at 21:52

## 2020-12-10 RX ADMIN — LITHIUM CARBONATE 150 MG: 150 CAPSULE, GELATIN COATED ORAL at 09:15

## 2020-12-10 RX ADMIN — LORATADINE 10 MG: 10 TABLET ORAL at 09:15

## 2020-12-10 RX ADMIN — METOPROLOL SUCCINATE 25 MG: 25 TABLET, EXTENDED RELEASE ORAL at 09:15

## 2020-12-10 RX ADMIN — DIVALPROEX SODIUM 500 MG: 125 CAPSULE, COATED PELLETS ORAL at 21:52

## 2020-12-10 RX ADMIN — LEVOTHYROXINE SODIUM 50 MCG: 25 TABLET ORAL at 06:33

## 2020-12-10 NOTE — NURSING NOTE
Patient remained in [place] chair and was ambulated prn with assist x2  She is resistive to care and irritable/anxious especially when discussing her  who she is paranoid about saying he "is going to dump her"  After "putting her here" However, she was agitiated by writer, did not trust writer "because of taking care of other people" and this required another caregiver to administer medications as she would not take them from [address]   She seems intermittently confused

## 2020-12-10 NOTE — PROGRESS NOTES
Psychiatry Progress Note    Subjective: Interval History     The patient is lying in bed this morning  She is upset and believes that she was incontinent of urine  She states that she is being held here against her will  Patient is very tearful during discussion  This morning patient refused thyroid medication and refused blood work to be drawn  She did agree to these later this morning  Lithium level 0 7 today  Yesterday per report she was resistive to care and anxious  She was paranoid and saying that her  was going to dump her  She was agitated  She has been confused at times  Patient did not attend any group activities yesterday      Behavior over the last 24 hours:  unchanged  Sleep: normal  Appetite: fair  Medication side effects: No  ROS: no complaints    Current medications:    Current Facility-Administered Medications:     acetaminophen (TYLENOL) tablet 650 mg, 650 mg, Oral, Q6H PRN, Augustine Resendiz MD    cholecalciferol (VITAMIN D3) tablet 1,000 Units, 1,000 Units, Oral, Daily, Augustine Resendiz MD, 1,000 Units at 12/09/20 0803    divalproex sodium (DEPAKOTE SPRINKLE) capsule 500 mg, 500 mg, Oral, HS, Bety Muñoz MD, 500 mg at 12/09/20 2117    levothyroxine tablet 25 mcg, 25 mcg, Oral, Every Other Day, Augustine Resendiz MD, 25 mcg at 12/10/20 6734    levothyroxine tablet 50 mcg, 50 mcg, Oral, Every Other Day, Augustine Resendiz MD, 50 mcg at 12/10/20 9326    lithium carbonate capsule 150 mg, 150 mg, Oral, QABARB, Misty Buck PA-C, 150 mg at 12/09/20 0803    loratadine (CLARITIN) tablet 10 mg, 10 mg, Oral, Daily, Augustine Resendiz MD, 10 mg at 12/09/20 0803    LORazepam (ATIVAN) tablet 0 5 mg, 0 5 mg, Oral, Q8H PRN, Augustine Resendiz MD    melatonin tablet 10 mg, 10 mg, Oral, HS, Augustine Resendiz MD, 10 mg at 12/09/20 2116    metoprolol succinate (TOPROL-XL) 24 hr tablet 25 mg, 25 mg, Oral, BID, Augustine Resendiz MD, 25 mg at 12/09/20 2116    OLANZapine (ZyPREXA) tablet 5 mg, 5 mg, Oral, HS, Kathrin Smyth MD, 5 mg at 12/09/20 2117    polyethylene glycol (MIRALAX) packet 17 g, 17 g, Oral, Daily, Chioma Faith MD, 17 g at 12/09/20 0803    senna-docusate sodium (SENOKOT S) 8 6-50 mg per tablet 1 tablet, 1 tablet, Oral, HS, Chioma Faith MD, 1 tablet at 12/09/20 2117    temazepam (RESTORIL) capsule 15 mg, 15 mg, Oral, HS, Kathrin Smyth MD, 15 mg at 12/09/20 2117    Current Problem List:    Patient Active Problem List   Diagnosis    [facility]    Essential hypertension    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    Hypothyroidism    Bipolar depression [facility]    Chronic leg pain    Seasonal allergies    Bilateral dry eyes    Age-related osteoporosis without current pathological fracture    ANNA MARIE (acute kidney injury) ([facility]    Anxiety disorder    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[facility]    Dementia ([facility]    Gait abnormality    History of Clostridium difficile infection    Menopause    Osteopenia    Thickened endometrium    Lithium toxicity    Abdominal distension    Medical clearance for psychiatric admission       Problem list reviewed 12/10/20     Objective:     Vital Signs:  Vitals:    12/09/20 1429 12/09/20 2116 12/09/20 2132 12/10/20 0659   BP: 119/55 126/78 159/72 134/63   BP Location: Left arm  Right arm Right arm   Pulse: 92 82 (!) 111 87   Resp: 16  18 16   Temp: 98 4 °F (36 9 °C)  98 4 °F (36 9 °C) 98 °F (36 7 °C)   TempSrc: Temporal  Temporal Temporal   SpO2: 96%  94% 93%   Weight:       Height:             Appearance:  age appropriate and disheveled   Behavior:  guarded   Speech:  soft   Mood:  irritable   Affect:  labile   Thought Process:  loose associations   Thought Content:  delusions  persecutory   Perceptual Disturbances: None   Risk Potential: none   Sensorium:  person and place   Cognition:  recent and remote memory: unable to assess due to lack of cooperation   Consciousness:  alert and awake    Attention: attention span and concentration were decreased   Intellect: average Insight:  limited   Judgment: limited      Motor Activity: no abnormal movements       I/O Past 24 hours:  I/O last 3 completed shifts: In: 1300 [P O :1300]  Out: -   I/O this shift: In: 400 [P O :400]  Out: -         Labs:  Reviewed 12/10/20    Progress Toward Goals:  Unchanged    Assessment / Plan:     Bipolar 1 disorder ([facility] )    Recommended Treatment:      Medication changes:  1) continue current medication regimen  Non-pharmacological treatments  1) Continue with group therapy, milieu therapy and occupational therapy  Safety  1) Safety/communication plan established targeting dynamic risk factors above  2) Risks, benefits, and possible side effects of medications explained to patient and patient verbalizes understanding  Counseling / Coordination of Care    Total floor / unit time spent today 20 minutes  Greater than 50% of total time was spent with the patient and / or family counseling and / or coordination of care  A description of the counseling / coordination of care  Patient's Rights, confidentiality and exceptions to confidentiality, use of automated medical record, [facility] staff access to medical record, and consent to treatment reviewed      Yadi Alexis PA-C

## 2020-12-10 NOTE — NURSING NOTE
Up for breakfast with assistance,somewhat irritable on approach this morning  Pt denies any depression or anxiety,denies any SI/HI/AH/VH  Med compliant  LI level today-0 7, psych is aware  Will continue to monitor closely and provide support

## 2020-12-10 NOTE — PLAN OF CARE
Not fully engaging in group room social   Problem: Ineffective Coping  Goal: Participates in unit activities  Description: Interventions:  - Provide therapeutic environment   - Provide required programming   - Redirect inappropriate behaviors   12/10/2020 1516 by GLO Hernandez  Outcome: Not Progressing   interaction

## 2020-12-10 NOTE — SPEECH THERAPY NOTE
Speech Language/Pathology    Speech/Language Pathology Progress Note    Patient Name: Dang Mattson  Today's Date: 12/10/2020     Problem List  Principal Problem:    Bipolar 1 disorder ([facility] )  Active Problems:    Essential hypertension    ANNA MARIE (acute kidney injury) ([facility] )    Medical clearance for psychiatric admission       Past Medical History  Past Medical History:   Diagnosis Date    Bipolar depression ([facility] )     [facility]     Essential hypertension     Hypothyroidism     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         Past Surgical History  Past Surgical History:   Procedure Laterality Date    TONSILLECTOMY      TOTAL HIP ARTHROPLASTY Right      Subjective:  Pt was alert eating in a chair upon ST arrival   Objective:  Pt was seen for f/u dysphagia treatment  Pt was agreeable to mashed potatoes, one cube of roast beef, carrot cake, and thin liquids  Mastication was inefficient w/ trial of roast beef  Pt had to spit out partially masticated bolus  Mastication was [facility] for other PO administered  Bolus formation, control, and transfer appeared [facility]  Swallows were prompt w/ laryngeal rise observed to be fair  Coughing was observed x2 w/ consecutive sips of thin liquids through straw  Assessment:  Pt had some difficulty w/ current regular diet w/ thin liquids  Roast beef mastication was inefficient and needed to be spit out  S/s of aspiration observed x2 w/ consecutive straw sips of thin liquids  Intermittent s/s aspiration also observed in evaluation  Will continue to assess for tolerance of thin liquids  Plan/Recommendations:  Continue pt on current regular diet w/ thin liquids as meal completion have been % at other meals  Continue with intermittent supervision/assitance w/ tray set up  ST to f/u for  diet tolerance during another mealtime

## 2020-12-10 NOTE — PROGRESS NOTES
12/10/20 0920   Team Meeting   Meeting Type Daily Rounds   Initial Conference Date 12/10/20   Team Members Present   Team Members Present Physician;Nurse;   Physician Team Member Rubén Chacon   Nursing Team Member [facility] Management Team Member Madalyn   Patient/Family Present   Patient Present No   Patient's Family Present No     Tearful, lithium level = 0 7, anxious, paranoid about , confused at times, no groups, med compliant, needs STR placement

## 2020-12-10 NOTE — NURSING NOTE
Patient refused her thyroid medication and refused to permit bloodwork  Patient is not able to be reassured and physically resistant at attempts to provide care

## 2020-12-10 NOTE — CASE MANAGEMENT
Spoke with pt's , Jerardo Grajeda, regarding PT/OT eval recommendations and need for STR  He is agreeable to getting pt assessed for LOC and sending out STR referrals  Family members have been calling to speak with pt -- pt seems paranoid and upset with  still       MA51 and PASRR completed -- sent to [address] [place] [address] for LOC assessment

## 2020-12-11 PROCEDURE — 97535 SELF CARE MNGMENT TRAINING: CPT

## 2020-12-11 PROCEDURE — 92526 ORAL FUNCTION THERAPY: CPT

## 2020-12-11 PROCEDURE — 97530 THERAPEUTIC ACTIVITIES: CPT

## 2020-12-11 RX ORDER — TEMAZEPAM 7.5 MG/1
7.5 CAPSULE ORAL
Status: DISCONTINUED | OUTPATIENT
Start: 2020-12-11 | End: 2020-12-22 | Stop reason: HOSPADM

## 2020-12-11 RX ADMIN — LORAZEPAM 0.5 MG: 0.5 TABLET ORAL at 15:17

## 2020-12-11 RX ADMIN — LITHIUM CARBONATE 150 MG: 150 CAPSULE, GELATIN COATED ORAL at 08:50

## 2020-12-11 RX ADMIN — TEMAZEPAM 7.5 MG: 7.5 CAPSULE ORAL at 21:14

## 2020-12-11 RX ADMIN — LORATADINE 10 MG: 10 TABLET ORAL at 08:49

## 2020-12-11 RX ADMIN — OLANZAPINE 5 MG: 5 TABLET, FILM COATED ORAL at 21:13

## 2020-12-11 RX ADMIN — DIVALPROEX SODIUM 500 MG: 125 CAPSULE, COATED PELLETS ORAL at 21:13

## 2020-12-11 RX ADMIN — METOPROLOL SUCCINATE 25 MG: 25 TABLET, EXTENDED RELEASE ORAL at 21:13

## 2020-12-11 RX ADMIN — SENNOSIDES AND DOCUSATE SODIUM 1 TABLET: 8.6; 5 TABLET ORAL at 21:13

## 2020-12-11 RX ADMIN — METOPROLOL SUCCINATE 25 MG: 25 TABLET, EXTENDED RELEASE ORAL at 08:50

## 2020-12-11 RX ADMIN — POLYETHYLENE GLYCOL 3350 17 G: 17 POWDER, FOR SOLUTION ORAL at 08:50

## 2020-12-11 RX ADMIN — Medication 10 MG: at 21:13

## 2020-12-11 RX ADMIN — CHOLECALCIFEROL TAB 25 MCG (1000 UNIT) 1000 UNITS: 25 TAB at 08:49

## 2020-12-11 NOTE — SPEECH THERAPY NOTE
Speech Language/Pathology    Speech/Language Pathology Progress Note    Patient Name: Saundra Almendarez  SSJPE'K Date: 12/11/2020     Problem List  Principal Problem:    Bipolar 1 disorder ([facility] )  Active Problems:    Essential hypertension    ANNA MARIE (acute kidney injury) ([facility] )    Medical clearance for psychiatric admission       Past Medical History  Past Medical History:   Diagnosis Date    Bipolar depression ([place] [address] )     COPD (chronic obstructive pulmonary disease) ([place] [address] )     Essential hypertension     Hypothyroidism     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[place] [address] )         Past Surgical History  Past Surgical History:   Procedure Laterality Date    TONSILLECTOMY      TOTAL HIP ARTHROPLASTY Right          Subjective:  "I can't remember how I take my coffee  Isn't that funny"     Objective: The patient is seen for f/u dysphagia therapy at lunch meal  She is awake and alert today  She is able to feed herself and is assessed with a regular tray, including salad, pasta with vegetables with thin liquids and Ensure  Bite size and rate is adequate  The patient has timely and efficient mastication with no oral residue  She takes small, single sips of liquids via cup with no overt s/s aspiration  Intake has been % at recent meals  Assessment:  The patient is tolerating a regular diet well  Plan/Recommendations:  Continue a regular diet with thin liquids  No further ST appears warranted at this time  Please re-consult with with further concerns

## 2020-12-11 NOTE — PROGRESS NOTES
12/11/20 1400   Activity/Group Checklist   Group Other (Comment)   Attendance Attended   Attendance Duration (min) 31-45   Interactions Interacted appropriately   Affect/Mood Calm   Goals Achieved Able to listen to others; Able to recieve feedback     Pt attended the duration of the group  Pt participated in the group activity and some of the discussion on community resources

## 2020-12-11 NOTE — CASE MANAGEMENT
12/11/20 0856   Team Meeting   Meeting Type Daily Rounds   Initial Conference Date 12/11/20   Team Members Present   Team Members Present Physician;Nurse;;Occupational Therapist   Physician Team Member Dr Leticia Mcarthur Team Member [address] Management Team Member [place]   OT Team Member Stuart Rojo   Patient/Family Present   Patient Present No   Patient's Family Present No     Calm, cooperative, ambulates with assistance, no complaints or paranoia yesterday, had a good night, slowly improving, needs STR placement

## 2020-12-11 NOTE — PROGRESS NOTES
Psychiatry Progress Note    Subjective: Interval History     Patient is a little irritable this morning and upset with her  who put me here has little insight into her underlying bipolar, manic episode that she is dealing with  She seems more irritable today and a bit argumentative      Behavior over the last 24 hours:  unchanged  Sleep: normal  Appetite: normal  Medication side effects: No  ROS: no complaints    Current medications:    Current Facility-Administered Medications:     acetaminophen (TYLENOL) tablet 650 mg, 650 mg, Oral, Q6H PRN, Hiren Berman MD    cholecalciferol (VITAMIN D3) tablet 1,000 Units, 1,000 Units, Oral, Daily, Hiren Berman MD, 1,000 Units at 12/11/20 0849    divalproex sodium (DEPAKOTE SPRINKLE) capsule 500 mg, 500 mg, Oral, HS, Anil Gresham MD, 500 mg at 12/10/20 2152    levothyroxine tablet 25 mcg, 25 mcg, Oral, Every Other Day, Hiren Berman MD, 25 mcg at 12/10/20 2937    levothyroxine tablet 50 mcg, 50 mcg, Oral, Every Other Day, Hiren Berman MD, 50 mcg at 12/10/20 3103    lithium carbonate capsule 150 mg, 150 mg, Oral, QAM, Misty Buck PA-C, 150 mg at 12/11/20 0850    loratadine (CLARITIN) tablet 10 mg, 10 mg, Oral, Daily, Hiren Berman MD, 10 mg at 12/11/20 0849    LORazepam (ATIVAN) tablet 0 5 mg, 0 5 mg, Oral, Q8H PRN, Hiren Berman MD    melatonin tablet 10 mg, 10 mg, Oral, HS, Hiren Berman MD, 10 mg at 12/10/20 2152    metoprolol succinate (TOPROL-XL) 24 hr tablet 25 mg, 25 mg, Oral, BID, Hiren Berman MD, 25 mg at 12/11/20 0850    OLANZapine (ZyPREXA) tablet 5 mg, 5 mg, Oral, HS, Zully Chandra MD, 5 mg at 12/10/20 2152    polyethylene glycol (MIRALAX) packet 17 g, 17 g, Oral, Daily, Hiren Berman MD, 17 g at 12/11/20 0850    senna-docusate sodium (SENOKOT S) 8 6-50 mg per tablet 1 tablet, 1 tablet, Oral, HS, Hiren Berman MD, 1 tablet at 12/10/20 2152    temazepam (RESTORIL) capsule 7 5 mg, 7 5 mg, Oral, HS, Zully Chandra, MD    Current Problem List:    Patient Active Problem List   Diagnosis    [facility] ([facility] )    Essential hypertension    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[facility])    Hypothyroidism    B[facility]    Chronic leg pain    Seasonal allergies    Bilateral dry eyes    Age-related osteoporosis without current pathological fracture    ANNA MARIE (acute kidney injury) [facility]    Anxiety disorder    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[facility]    Dementia [facility]    Gait abnormality    History of Clostridium difficile infection    Menopause    Osteopenia    Thickened endometrium    Lithium toxicity    Abdominal distension    Medical clearance for psychiatric admission       Problem list reviewed 12/11/20     Objective:     Vital Signs:  Vitals:    12/10/20 0659 12/10/20 1441 12/10/20 2115 12/11/20 0702   BP: 134/63 119/58 170/81 128/56   BP Location: Right arm Left arm Right arm Right arm   Pulse: 87 76 99 76   Resp: 16 16 16 16   Temp: 98 °F (36 7 °C) 98 2 °F (36 8 °C) 99 3 °F (37 4 °C) 98 °F (36 7 °C)   TempSrc: Temporal Temporal Temporal Temporal   SpO2: 93% 96% 96% 94%   Weight:       Height:             Appearance:  age appropriate and casually dressed   Behavior:  normal   Speech:  normal volume   Mood:  depressed and irritable   Affect:  increased in intensity and labile   Thought Process:  normal   Thought Content:  delusions  persecutory   Perceptual Disturbances: None   Risk Potential: none   Sensorium:  person, place, situation and time   Cognition:  intact   Consciousness:  alert and awake    Attention: attention span and concentration were age appropriate   Intellect: average   Insight:  limited   Judgment: limited      Motor Activity: no abnormal movements       I/O Past 24 hours:  I/O last 3 completed shifts: In: 760 [P O :760]  Out: -   No intake/output data recorded          Labs:  Reviewed 12/11/20    Progress Toward Goals:  No change    Assessment / Plan:     Bipolar 1 disorder ([facility] )    Recommended Treatment: Medication changes:  1) reduce HS temazepam to 7 5 mg    Non-pharmacological treatments  1) Continue with group therapy, milieu therapy and occupational therapy  Safety  1) Safety/communication plan established targeting dynamic risk factors above  2) Risks, benefits, and possible side effects of medications explained to patient and patient verbalizes understanding  Counseling / Coordination of Care    Total floor / unit time spent today 20 minutes  Greater than 50% of total time was spent with the patient and / or family counseling and / or coordination of care  A description of the counseling / coordination of care  Patient's Rights, confidentiality and exceptions to confidentiality, use of automated medical record, [address] [place] [address] staff access to medical record, and consent to treatment reviewed      Nori Homans, MD

## 2020-12-11 NOTE — PLAN OF CARE
Problem: Prexisting or High Potential for Compromised Skin Integrity  Goal: Skin integrity is maintained or improved  Description: INTERVENTIONS:  - Identify patients at risk for skin breakdown  - Assess and monitor skin integrity  - Assess and monitor nutrition and hydration status  - Monitor labs   - Assess for incontinence   - Turn and reposition patient  - Assist with mobility/ambulation  - Relieve pressure over bony prominences  - Avoid friction and shearing  - Provide appropriate hygiene as needed including keeping skin clean and dry  - Evaluate need for skin moisturizer/barrier cream  - Collaborate with interdisciplinary team   - Patient/family teaching  - Consider wound care consult   Outcome: Progressing     Problem: Alteration in Thoughts and Perception  Goal: Treatment Goal: Gain control of psychotic behaviors/thinking, reduce/eliminate presenting symptoms and demonstrate improved reality functioning upon discharge  Outcome: Progressing  Goal: Verbalize thoughts and feelings  Description: Interventions:  - Promote a nonjudgmental and trusting relationship with the patient through active listening and therapeutic communication  - Assess patient's level of functioning, behavior and potential for risk  - Engage patient in 1 on 1 interactions  - Encourage patient to express fears, feelings, frustrations, and discuss symptoms    - [place] patient to reality, help patient recognize reality-based thinking   - Administer medications as ordered and assess for potential side effects  - Provide the patient education related to the signs and symptoms of the illness and desired effects of prescribed medications  Outcome: Progressing  Goal: Refrain from acting on delusional thinking/internal stimuli  Description: Interventions:  - Monitor patient closely, per order   - Utilize least restrictive measures   - Set reasonable limits, give positive feedback for acceptable   - Administer medications as ordered and monitor of potential side effects  Outcome: Progressing  Goal: Agree to be compliant with medication regime, as prescribed and report medication side effects  Description: Interventions:  - Offer appropriate PRN medication and supervise ingestion; conduct AIMS, as needed   Outcome: Progressing  Goal: Attend and participate in unit activities, including therapeutic, recreational, and educational groups  Description: Interventions:  -Encourage Visitation and family involvement in care  Outcome: Progressing  Goal: Recognize dysfunctional thoughts, communicate reality-based thoughts at the time of discharge  Description: Interventions:  - Provide medication and psycho-education to assist patient in compliance and developing insight into his/her illness   Outcome: Progressing  Goal: Complete daily ADLs, including personal hygiene independently, as able  Description: Interventions:  - Observe, teach, and assist patient with ADLS  - Monitor and promote a balance of rest/activity, with adequate nutrition and elimination   Outcome: Progressing     Problem: Alteration in Orientation  Goal: Treatment Goal: Demonstrate a reduction of confusion and improved orientation to person, place, time and/or situation upon discharge, according to optimum baseline/ability  Outcome: Progressing  Goal: Interact with staff daily  Description: Interventions:  - Assess and re-assess patient's level of orientation  - Engage patient in 1 on 1 interactions, daily, for a minimum of 15 minutes   - Establish rapport/trust with patient   Outcome: Progressing  Goal: Express concerns related to confused thinking related to:  Description: Interventions:  - Encourage patient to express feelings, fears, frustrations, hopes  - Assign consistent caregivers   - [place]/re-orient patient as needed  - Allow comfort items, as appropriate  - Provide visual cues, signs, etc    Outcome: Progressing  Goal: Allow medical examinations, as recommended  Description: Interventions:  - Provide physical/neurological exams and/or referrals, per provider   Outcome: Progressing  Goal: Cooperate with recommended testing/procedures  Description: Interventions:  - Determine need for ancillary testing  - Observe for mental status changes  - Implement falls/precaution protocol   Outcome: Progressing  Goal: Complete daily ADLs, including personal hygiene independently, as able  Description: Interventions:  - Observe, teach, and assist patient with ADLS  Outcome: Progressing     Problem: JULIO C  Goal: Will exhibit normal sleep and speech and no impulsivity  Description: INTERVENTIONS:  - Administer medication as ordered  - Set limits on impulsive behavior  - Make attempts to decrease external stimuli as possible  Outcome: Progressing     Problem: DISCHARGE PLANNING  Goal: Discharge to home or other facility with appropriate resources  Description: INTERVENTIONS:  - Identify barriers to discharge w/patient and caregiver  - Arrange for needed discharge resources and transportation as appropriate  - Identify discharge learning needs (meds, wound care, etc )  - Arrange for interpretive services to assist at discharge as needed  - Refer to Case Management Department for coordinating discharge planning if the patient needs post-hospital services based on physician/advanced practitioner order or complex needs related to functional status, cognitive ability, or social support system  Outcome: Progressing     Problem: Nutrition/Hydration-ADULT  Goal: Nutrient/Hydration intake appropriate for improving, restoring or maintaining nutritional needs  Description: Monitor and assess patient's nutrition/hydration status for malnutrition  Collaborate with interdisciplinary team and initiate plan and interventions as ordered  Monitor patient's weight and dietary intake as ordered or per policy  Utilize nutrition screening tool and intervene as necessary   Determine patient's food preferences and provide high-protein, high-caloric foods as appropriate       INTERVENTIONS:  - Monitor oral intake, urinary output, labs, and treatment plans  - Assess nutrition and hydration status and recommend course of action  - Evaluate amount of meals eaten  - Assist patient with eating if necessary   - Allow adequate time for meals  - Recommend/ encourage appropriate diets, oral nutritional supplements, and vitamin/mineral supplements  - Order, calculate, and assess calorie counts as needed  - Recommend, monitor, and adjust tube feedings and TPN/PPN based on assessed needs  - Assess need for intravenous fluids  - Provide specific nutrition/hydration education as appropriate  - Include patient/family/caregiver in decisions related to nutrition  Outcome: Progressing

## 2020-12-11 NOTE — NURSING NOTE
Patient is more calm and cooperative this evening  She was ambulated with assistance and RW prn and as nneded for toileting and ADL's  There was no c/o of  paranoid statements about her  or anything else  Generally this evening she did well

## 2020-12-11 NOTE — NURSING NOTE
Pt very labile, tearful, irritable  Crying when talking on phone, tearful when changed in bathroom  Noncompliant with utilizing chair alarm  Pt administered ativan po prn anxiety  Pt reassured, spoke to family on phone  PRN effective, calm behaviors throughout evening  Medication compliant  Appears to sleep

## 2020-12-11 NOTE — PLAN OF CARE
Problem: Ineffective Coping  Goal: Participates in unit activities  Description: Interventions:  - Provide therapeutic environment   - Provide required programming   - Redirect inappropriate behaviors   Outcome: Progressing  Note: Pt displayed variable energy level  Pt  did actively engage in seated musical exercise mid morning

## 2020-12-12 RX ADMIN — OLANZAPINE 5 MG: 5 TABLET, FILM COATED ORAL at 21:22

## 2020-12-12 RX ADMIN — LITHIUM CARBONATE 150 MG: 150 CAPSULE, GELATIN COATED ORAL at 08:50

## 2020-12-12 RX ADMIN — LORATADINE 10 MG: 10 TABLET ORAL at 08:50

## 2020-12-12 RX ADMIN — LEVOTHYROXINE SODIUM 25 MCG: 25 TABLET ORAL at 06:04

## 2020-12-12 RX ADMIN — LEVOTHYROXINE SODIUM 50 MCG: 25 TABLET ORAL at 06:03

## 2020-12-12 RX ADMIN — TEMAZEPAM 7.5 MG: 7.5 CAPSULE ORAL at 21:22

## 2020-12-12 RX ADMIN — DIVALPROEX SODIUM 500 MG: 125 CAPSULE, COATED PELLETS ORAL at 21:22

## 2020-12-12 RX ADMIN — METOPROLOL SUCCINATE 25 MG: 25 TABLET, EXTENDED RELEASE ORAL at 21:22

## 2020-12-12 RX ADMIN — METOPROLOL SUCCINATE 25 MG: 25 TABLET, EXTENDED RELEASE ORAL at 08:50

## 2020-12-12 RX ADMIN — Medication 10 MG: at 21:21

## 2020-12-12 RX ADMIN — CHOLECALCIFEROL TAB 25 MCG (1000 UNIT) 1000 UNITS: 25 TAB at 08:50

## 2020-12-12 NOTE — NURSING NOTE
Patient is visible in dayroom  Denies any suicidal or homicidal ideations  Anxiety is 3/4  Depression is 6/10  Denies any hallucinations   Ambulates with walker and assistance  Appetite is good  Patient is continent of urine and stool  Safety measures in place  Will continue to monitor

## 2020-12-12 NOTE — PROGRESS NOTES
Psychiatry Progress Note    Subjective: Interval History     Pt visible in day room  Pt continues to endorse ongoing depression and anxiety  Reports that she feels she is not being given enough pills- pt provided education and support  Denies SI and HI  No psychosis  Has been med and meal compliant  Was labile in mood yesterday- provided prn ativan which was effective  Slept       Behavior over the last 24 hours:  unchanged  Sleep: normal  Appetite: normal  Medication side effects: No  ROS: no complaints    Current medications:    Current Facility-Administered Medications:     acetaminophen (TYLENOL) tablet 650 mg, 650 mg, Oral, Q6H PRN, Yana Flores MD    cholecalciferol (VITAMIN D3) tablet 1,000 Units, 1,000 Units, Oral, Daily, Yana Flores MD, 1,000 Units at 12/12/20 0850    divalproex sodium (DEPAKOTE SPRINKLE) capsule 500 mg, 500 mg, Oral, HS, Jaclyn Patel MD, 500 mg at 12/11/20 2113    levothyroxine tablet 25 mcg, 25 mcg, Oral, Every Other Day, Yana Flores MD, 25 mcg at 12/12/20 0604    levothyroxine tablet 50 mcg, 50 mcg, Oral, Every Other Day, Yana Flores MD, 50 mcg at 12/12/20 0603    lithium carbonate capsule 150 mg, 150 mg, Oral, QABARB, Misty Buck PA-C, 150 mg at 12/12/20 0850    loratadine (CLARITIN) tablet 10 mg, 10 mg, Oral, Daily, Yana Flores MD, 10 mg at 12/12/20 0850    LORazepam (ATIVAN) tablet 0 5 mg, 0 5 mg, Oral, Q8H PRN, Yana Flores MD, 0 5 mg at 12/11/20 1517    melatonin tablet 10 mg, 10 mg, Oral, HS, Yana Flores MD, 10 mg at 12/11/20 2113    metoprolol succinate (TOPROL-XL) 24 hr tablet 25 mg, 25 mg, Oral, BID, Yana Flores MD, 25 mg at 12/12/20 0850    OLANZapine (ZyPREXA) tablet 5 mg, 5 mg, Oral, HS, Esteban Stroud MD, 5 mg at 12/11/20 2113    polyethylene glycol (MIRALAX) packet 17 g, 17 g, Oral, Daily, Yana Flores MD, 17 g at 12/11/20 0850    senna-docusate sodium (SENOKOT S) 8 6-50 mg per tablet 1 tablet, 1 tablet, Oral, , Oma Kike Becerril MD, 1 tablet at 12/11/20 2113    temazepam (RESTORIL) capsule 7 5 mg, 7 5 mg, Oral, HS, Elizabeth Dodge MD, 7 5 mg at 12/11/20 2114    Current Problem List:    Patient Active Problem List   Diagnosis    [facility] )    Essential hypertension    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HCC)    Hypothyroidism    Bipolar depression ([facility] )    Chronic leg pain    Seasonal allergies    Bilateral dry eyes    Age-related osteoporosis without current pathological fracture    ANNA MARIE (acute kidney injury) ([facility] )    Anxiety disorder    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[facility] )    Dementia ([place] [id number] )    Gait abnormality    History of Clostridium difficile infection    Menopause    Osteopenia    Thickened endometrium    Lithium toxicity    Abdominal distension    Medical clearance for psychiatric admission       Problem list reviewed 12/12/20     Objective:     Vital Signs:  Vitals:    12/11/20 2052 12/12/20 0549 12/12/20 0600 12/12/20 0650   BP: 143/64   134/63   BP Location: Right arm   Right arm   Pulse: 95   75   Resp: 16   16   Temp: 99 7 °F (37 6 °C)   (!) 96 7 °F (35 9 °C)   TempSrc: Tympanic   Temporal   SpO2: 96%   94%   Weight:  59 kg (130 lb) 59 kg (130 lb)    Height:             Appearance:  age appropriate and casually dressed   Behavior:  normal   Speech:  soft   Mood:  anxious and depressed   Affect:  labile   Thought Process:  normal   Thought Content:  normal   Perceptual Disturbances: None   Risk Potential: none   Sensorium:  person, place and situation   Cognition:  intact   Consciousness:  alert and awake    Attention: attention span and concentration were age appropriate   Intellect: average   Insight:  limited   Judgment: limited      Motor Activity: no abnormal movements       I/O Past 24 hours:  I/O last 3 completed shifts: In: 660 [P O :660]  Out: -   No intake/output data recorded          Labs:  Reviewed 12/12/20    Progress Toward Goals:  unchanged    Assessment / Plan:     Bipolar 1 disorder ([facility] )    Recommended Treatment:      Medication changes  1) Continue current treatment plan    Non-pharmacological treatments  1) Continue with group therapy, milieu therapy and occupational therapy  Safety  1) Safety/communication plan established targeting dynamic risk factors above  2) Risks, benefits, and possible side effects of medications explained to patient and patient verbalizes understanding  Counseling / Coordination of Care    Total floor / unit time spent today 20 minutes  Greater than 50% of total time was spent with the patient and / or family counseling and / or coordination of care  A description of the counseling / coordination of care  Patient's Rights, confidentiality and exceptions to confidentiality, use of automated medical record, [facility] staff access to [facility] record, and consent to treatment reviewed      Candace Kawasaki, PA-C

## 2020-12-13 RX ADMIN — LORATADINE 10 MG: 10 TABLET ORAL at 08:40

## 2020-12-13 RX ADMIN — CHOLECALCIFEROL TAB 25 MCG (1000 UNIT) 1000 UNITS: 25 TAB at 08:40

## 2020-12-13 RX ADMIN — METOPROLOL SUCCINATE 25 MG: 25 TABLET, EXTENDED RELEASE ORAL at 08:40

## 2020-12-13 RX ADMIN — TEMAZEPAM 7.5 MG: 7.5 CAPSULE ORAL at 21:36

## 2020-12-13 RX ADMIN — Medication 10 MG: at 21:35

## 2020-12-13 RX ADMIN — OLANZAPINE 5 MG: 5 TABLET, FILM COATED ORAL at 21:35

## 2020-12-13 RX ADMIN — SENNOSIDES AND DOCUSATE SODIUM 1 TABLET: 8.6; 5 TABLET ORAL at 21:36

## 2020-12-13 RX ADMIN — LITHIUM CARBONATE 150 MG: 150 CAPSULE, GELATIN COATED ORAL at 08:40

## 2020-12-13 RX ADMIN — METOPROLOL SUCCINATE 25 MG: 25 TABLET, EXTENDED RELEASE ORAL at 21:35

## 2020-12-13 RX ADMIN — DIVALPROEX SODIUM 500 MG: 125 CAPSULE, COATED PELLETS ORAL at 21:35

## 2020-12-13 NOTE — PROGRESS NOTES
Psychiatry Progress Note    Subjective: Interval History     Continues to endorse ongoing depression anxiety  Patient reporting no improvement her symptoms since her admission  Patient continues to express that she feels that she needs more medications  Patient denying any suicidal ideations or homicidal ideations  No overt psychosis  Has been medication and meal compliant  Staff does report that patient does continue to display anxiety  Did not make any overt paranoid or suspicious last evening      Behavior over the last 24 hours:  unchanged  Sleep: normal  Appetite: normal  Medication side effects: No  ROS: no complaints    Current medications:    Current Facility-Administered Medications:     acetaminophen (TYLENOL) tablet 650 mg, 650 mg, Oral, Q6H PRN, Trevin Timmons MD    cholecalciferol (VITAMIN D3) tablet 1,000 Units, 1,000 Units, Oral, Daily, Trevin Timmons MD, 1,000 Units at 12/12/20 0850    divalproex sodium (DEPAKOTE SPRINKLE) capsule 500 mg, 500 mg, Oral, HS, Braydon Arango MD, 500 mg at 12/12/20 2122    levothyroxine tablet 25 mcg, 25 mcg, Oral, Every Other Day, Trevin Timmons MD, 25 mcg at 12/12/20 0604    levothyroxine tablet 50 mcg, 50 mcg, Oral, Every Other Day, Trevin Timmons MD, 50 mcg at 12/12/20 0603    lithium carbonate capsule 150 mg, 150 mg, Oral, QAMisty DAY PA-C, 150 mg at 12/12/20 0850    loratadine (CLARITIN) tablet 10 mg, 10 mg, Oral, Daily, Trevin Timmons MD, 10 mg at 12/12/20 0850    LORazepam (ATIVAN) tablet 0 5 mg, 0 5 mg, Oral, Q8H PRN, Trevin Timmons MD, 0 5 mg at 12/11/20 1517    melatonin tablet 10 mg, 10 mg, Oral, HS, Trevin Timmons MD, 10 mg at 12/12/20 2121    metoprolol succinate (TOPROL-XL) 24 hr tablet 25 mg, 25 mg, Oral, BID, Trevin Timmons MD, 25 mg at 12/12/20 2122    OLANZapine (ZyPREXA) tablet 5 mg, 5 mg, Oral, HS, Meera Gtz MD, 5 mg at 12/12/20 2122    polyethylene glycol (MIRALAX) packet 17 g, 17 g, Oral, Daily, Oma Dee Dee Muñoz MD, 17 g at 12/11/20 0850    senna-docusate sodium (SENOKOT S) 8 6-50 mg per tablet 1 tablet, 1 tablet, Oral, HS, Hiren Berman MD, 1 tablet at 12/11/20 2113    temazepam (RESTORIL) capsule 7 5 mg, 7 5 mg, Oral, HS, Zully Chandra MD, 7 5 mg at 12/12/20 2122    Current Problem List:    Patient Active Problem List   Diagnosis    [facility]    Essential hypertension    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[facility] )    Hypothyroidism    Bipolar depression ([facility]    Chronic leg pain    Seasonal allergies    Bilateral dry eyes    Age-related osteoporosis without current pathological fracture    [facility]    Anxiety disorder    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    D[facility]    Gait abnormality    History of Clostridium difficile infection    Menopause    Osteopenia    Thickened endometrium    Lithium toxicity    Abdominal distension    Medical clearance for psychiatric admission       Problem list reviewed 12/13/20     Objective:     Vital Signs:  Vitals:    12/12/20 0650 12/12/20 1446 12/12/20 2047 12/13/20 0710   BP: 134/63 124/56 125/60 122/55   BP Location: Right arm Right arm Right arm Right arm   Pulse: 75 82 97 81   Resp: 16 17 16 16   Temp: (!) 96 7 °F (35 9 °C) 97 9 °F (36 6 °C) 98 4 °F (36 9 °C) (!) 97 4 °F (36 3 °C)   TempSrc: Temporal Temporal Temporal Temporal   SpO2: 94% 95% 95% 92%   Weight:       Height:             Appearance:  age appropriate and casually dressed   Behavior:  normal   Speech:  soft   Mood:  anxious and depressed   Affect:  labile   Thought Process:  normal   Thought Content:  normal   Perceptual Disturbances: None   Risk Potential: none   Sensorium:  person, place and situation   Cognition:  intact   Consciousness:  alert and awake    Attention: attention span and concentration were age appropriate   Intellect: average   Insight:  limited   Judgment: limited      Motor Activity: no abnormal movements       I/O Past 24 hours:  I/O last 3 completed shifts: In: 720 [P O :720]  Out: -   No intake/output data recorded  Labs:  Reviewed 12/13/20    Progress Toward Goals:  unchanged    Assessment / Plan:     Bipolar 1 disorder (HCC)    Recommended Treatment:      Medication changes  1) Continue current treatment plan    Non-pharmacological treatments  1) Continue with group therapy, milieu therapy and occupational therapy  Safety  1) Safety/communication plan established targeting dynamic risk factors above  2) Risks, benefits, and possible side effects of medications explained to patient and patient verbalizes understanding  Counseling / Coordination of Care    Total floor / unit time spent today 20 minutes  Greater than 50% of total time was spent with the patient and / or family counseling and / or coordination of care  A description of the counseling / coordination of care  Patient's Rights, confidentiality and exceptions to confidentiality, use of automated medical record, [facility] access to medical record, and consent to treatment reviewed      Candace Kawasaki, PA-C

## 2020-12-13 NOTE — NURSING NOTE
Patient is anxious at times, especially if you touch her walker  She does not rate her depression/anxiety  She did NOT make any paranoid or suspicious statements about her  or staff  She had a large loose stool  Fluids encouraged and taken well

## 2020-12-13 NOTE — PLAN OF CARE
Problem: Alteration in Thoughts and Perception  Goal: Treatment Goal: Gain control of psychotic behaviors/thinking, reduce/eliminate presenting symptoms and demonstrate improved reality functioning upon discharge  Outcome: Progressing  Goal: Verbalize thoughts and feelings  Description: Interventions:  - Promote a nonjudgmental and trusting relationship with the patient through active listening and therapeutic communication  - Assess patient's level of functioning, behavior and potential for risk  - Engage patient in 1 on 1 interactions  - Encourage patient to express fears, feelings, frustrations, and discuss symptoms    - Sellers patient to reality, help patient recognize reality-based thinking   - Administer medications as ordered and assess for potential side effects  - Provide the patient education related to the signs and symptoms of the illness and desired effects of prescribed medications  Outcome: Progressing  Goal: Refrain from acting on delusional thinking/internal stimuli  Description: Interventions:  - Monitor patient closely, per order   - Utilize least restrictive measures   - Set reasonable limits, give positive feedback for acceptable   - Administer medications as ordered and monitor of potential side effects  Outcome: Progressing  Goal: Agree to be compliant with medication regime, as prescribed and report medication side effects  Description: Interventions:  - Offer appropriate PRN medication and supervise ingestion; conduct AIMS, as needed   Outcome: Progressing     Problem: Prexisting or High Potential for Compromised Skin Integrity  Goal: Skin integrity is maintained or improved  Description: INTERVENTIONS:  - Identify patients at risk for skin breakdown  - Assess and monitor skin integrity  - Assess and monitor nutrition and hydration status  - Monitor labs   - Assess for incontinence   - Turn and reposition patient  - Assist with mobility/ambulation  - Relieve pressure over bony prominences  - Avoid friction and shearing  - Provide appropriate hygiene as needed including keeping skin clean and dry  - Evaluate need for skin moisturizer/barrier cream  - Collaborate with interdisciplinary team   - Patient/family teaching  - Consider wound care consult   Outcome: Not Progressing

## 2020-12-13 NOTE — NURSING NOTE
Pt calm, cooperative with care, and medication adherent  Pt is oriented x3 and has not been irritable this shift  Endorses good appetite (eating 100% breakfast, and 50% lunch), sleep, and energy  Denies all symptoms  Visible in the dayroom

## 2020-12-14 RX ORDER — AMMONIUM LACTATE 12 G/100G
LOTION TOPICAL 2 TIMES DAILY
Status: DISCONTINUED | OUTPATIENT
Start: 2020-12-14 | End: 2020-12-22 | Stop reason: HOSPADM

## 2020-12-14 RX ADMIN — LITHIUM CARBONATE 150 MG: 150 CAPSULE, GELATIN COATED ORAL at 08:30

## 2020-12-14 RX ADMIN — OLANZAPINE 7.5 MG: 5 TABLET, FILM COATED ORAL at 21:56

## 2020-12-14 RX ADMIN — METOPROLOL SUCCINATE 25 MG: 25 TABLET, EXTENDED RELEASE ORAL at 21:56

## 2020-12-14 RX ADMIN — TEMAZEPAM 7.5 MG: 7.5 CAPSULE ORAL at 21:57

## 2020-12-14 RX ADMIN — METOPROLOL SUCCINATE 25 MG: 25 TABLET, EXTENDED RELEASE ORAL at 08:30

## 2020-12-14 RX ADMIN — DIVALPROEX SODIUM 500 MG: 125 CAPSULE, COATED PELLETS ORAL at 21:56

## 2020-12-14 RX ADMIN — CHOLECALCIFEROL TAB 25 MCG (1000 UNIT) 1000 UNITS: 25 TAB at 08:30

## 2020-12-14 RX ADMIN — POLYETHYLENE GLYCOL 3350 17 G: 17 POWDER, FOR SOLUTION ORAL at 08:31

## 2020-12-14 RX ADMIN — LORATADINE 10 MG: 10 TABLET ORAL at 08:30

## 2020-12-14 RX ADMIN — SENNOSIDES AND DOCUSATE SODIUM 1 TABLET: 8.6; 5 TABLET ORAL at 21:56

## 2020-12-14 RX ADMIN — Medication 10 MG: at 21:57

## 2020-12-14 RX ADMIN — LEVOTHYROXINE SODIUM 50 MCG: 25 TABLET ORAL at 05:11

## 2020-12-14 RX ADMIN — LEVOTHYROXINE SODIUM 25 MCG: 25 TABLET ORAL at 05:11

## 2020-12-14 NOTE — NURSING NOTE
Follow up on +2 pitting edema on her Right lower leg   Patient denies pain and discomfort   Writer reccommended [employer] again but She dont want to see a Dr at night   She wanted to see in the morning  states '' do not disturb her sleep ''  Will continue to monitor

## 2020-12-14 NOTE — OCCUPATIONAL THERAPY NOTE
Occupational Therapy Treatment Note     Patient Name: Pretty BARNES Date: 12/14/2020  Problem List  Principal Problem:    Bipolar 1 disorder ([facility] )  Active Problems:    Essential hypertension    ANNA MARIE (acute kidney injury) ([facility] )    Medical clearance for psychiatric admission       **DELAYED NOTE ENTRY IN ERROR*     12/11/20 1505   OT Last Visit   OT Visit Date 12/11/20   Note Type   Note Type Treatment   Restrictions/Precautions   Weight Bearing Precautions Per Order No   Other Precautions Cognitive; Chair Alarm; Bed Alarm   Pain Assessment   Pain Assessment Tool Pain Assessment not indicated - pt denies pain   ADL   LB Dressing Assistance 3  Moderate Assistance   LB Dressing Deficit Don/doff L sock; Don/doff R sock; Thread RLE into underwear; Thread LLE into underwear   Toileting Assistance  2  Maximal Assistance   Toileting Deficit Clothing management up;Clothing management down;Perineal hygiene   Transfers   Sit to Stand 4  Minimal assistance   Additional items Assist x 1; Increased time required;Verbal cues   Stand to Sit 4  Minimal assistance   Additional items Assist x 1; Increased time required;Verbal cues   Toilet transfer 3  Moderate assistance   Additional items Assist x 1; Increased time required;Verbal cues   Functional Mobility   Functional Mobility 4  Minimal assistance   Additional Comments x 1, short distances with chair follow   Additional items Rolling walker   Cognition   Arousal/Participation Alert   Attention Attends with cues to redirect   Orientation Level Oriented to person;Disoriented to situation   Memory Decreased recall of precautions;Decreased recall of recent events   Following Commands Follows one step commands with increased time or repetition   Activity Tolerance   Activity Tolerance Patient tolerated treatment well   Assessment   Assessment Pt seen for OT txt  Pt continues to be confused and requires frequent redirection to task due to poor attention and safety awareness    Pt with progress today requiring Marc for transfers, limited by rigid posturing and narrow base of support  Pt assisted with toileting including clothing management maxA  Overall, Pt remains below her functional baseline, limited by decreased safety, cognition, functional strength, activity tolerance, and standing balance  Pt would benefit from continued OT services to further address deficits, recommend discharge to post acute rehab  Plan   Treatment Interventions ADL retraining;Functional transfer training;UE strengthening/ROM; Endurance training;Continued evaluation; Activityengagement; Energy conservation   Goal Expiration Date 12/23/20   OT Treatment Day 2   OT Frequency 2-3x/wk   Recommendation   OT Discharge Recommendation Post-Acute Rehabilitation Services

## 2020-12-14 NOTE — PROGRESS NOTES
Psychiatry Progress Note    Subjective: Interval History     Patient is resting in bed this morning  She is irritable  She reports that her  put her here because he cannot deal with her anymore  Patient is forgetful at times  She is oriented to person and part of place this morning  Patient has been compliant with medications  Appetite is fair  Patient did sleep through the night  She is tolerating the recent decrease in temazepam well  Patient is anxious at times  She reports that she is feeling a bit depressed      Behavior over the last 24 hours:  unchanged  Sleep: normal  Appetite: fair  Medication side effects: No  ROS: no complaints    Current medications:    Current Facility-Administered Medications:     acetaminophen (TYLENOL) tablet 650 mg, 650 mg, Oral, Q6H PRN, Meenakshi Andres MD    cholecalciferol (VITAMIN D3) tablet 1,000 Units, 1,000 Units, Oral, Daily, Meenakshi Andres MD, 1,000 Units at 12/14/20 0830    divalproex sodium (DEPAKOTE SPRINKLE) capsule 500 mg, 500 mg, Oral, HS, Sydna Bosworth, MD, 500 mg at 12/13/20 2135    levothyroxine tablet 25 mcg, 25 mcg, Oral, Every Other Day, Meenakshi Andres MD, 25 mcg at 12/14/20 0511    levothyroxine tablet 50 mcg, 50 mcg, Oral, Every Other Day, Meenakshi Andres MD, 50 mcg at 12/14/20 0511    lithium carbonate capsule 150 mg, 150 mg, Oral, QAMisty DAY PA-C, 150 mg at 12/14/20 0830    loratadine (CLARITIN) tablet 10 mg, 10 mg, Oral, Daily, Meenakshi Andres MD, 10 mg at 12/14/20 0830    LORazepam (ATIVAN) tablet 0 5 mg, 0 5 mg, Oral, Q8H PRN, Meenakshi Andres MD, 0 5 mg at 12/11/20 1517    melatonin tablet 10 mg, 10 mg, Oral, HS, Meenakshi Andres MD, 10 mg at 12/13/20 2135    metoprolol succinate (TOPROL-XL) 24 hr tablet 25 mg, 25 mg, Oral, BID, Meenakshi Andres MD, 25 mg at 12/14/20 0830    OLANZapine (ZyPREXA) tablet 7 5 mg, 7 5 mg, Oral, HS, Jose Lira PA-C    polyethylene glycol (MIRALAX) packet 17 g, 17 g, Oral, Daily, Olena Buchanan MD, 17 g at 12/14/20 0831    senna-docusate sodium (SENOKOT S) 8 6-50 mg per tablet 1 tablet, 1 tablet, Oral, HS, Olena Buchanan MD, 1 tablet at 12/13/20 2136    temazepam (RESTORIL) capsule 7 5 mg, 7 5 mg, Oral, HS, Shola Desai MD, 7 5 mg at 12/13/20 2136    Current Problem List:    Patient Active Problem List   Diagnosis    [facility]    Essential hypertension    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    Hypothyroidism    Bipolar depression ([facility]    Chronic leg pain    Seasonal allergies    Bilateral dry eyes    Age-related osteoporosis without current pathological fracture    ANNA [facility]acute [facility]) [facility]    Anxiety disorder    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[facility]    Dementia [facility]    Gait abnormality    History of Clostridium difficile infection    Menopause    Osteopenia    Thickened endometrium    Lithium toxicity    Abdominal distension    Medical clearance for psychiatric admission       Problem list reviewed 12/14/20     Objective:     Vital Signs:  Vitals:    12/13/20 0710 12/13/20 1420 12/13/20 2101 12/14/20 0701   BP: 122/55 114/72 118/56 125/59   BP Location: Right arm Right arm Right arm Right arm   Pulse: 81 83 87 80   Resp: 16 17 16 16   Temp: (!) 97 4 °F (36 3 °C) 98 3 °F (36 8 °C) 98 7 °F (37 1 °C) 97 5 °F (36 4 °C)   TempSrc: Temporal Temporal Temporal Temporal   SpO2: 92% 96% 97% 92%   Weight:       Height:             Appearance:  age appropriate and disheveled   Behavior:  guarded   Speech:  soft   Mood:  irritable   Affect:  labile   Thought Process:  loose associations   Thought Content:  delusions  persecutory   Perceptual Disturbances: None   Risk Potential: none   Sensorium:  person and place   Cognition:  recent and remote memory: unable to assess due to lack of cooperation   Consciousness:  alert and awake    Attention: attention span and concentration were decreased   Intellect: average   Insight:  limited   Judgment: limited      Motor Activity: no abnormal movements       I/O Past 24 hours:  I/O last 3 completed shifts: In: 860 [P O :860]  Out: -   No intake/output data recorded  Labs:  Reviewed 12/14/20    Progress Toward Goals:  Unchanged    Assessment / Plan:     Bipolar 1 disorder (HCC)    Recommended Treatment:      Medication changes:  1) check Depakote level tomorrow morning  Increase Zyprexa 7 5 mg HS  Non-pharmacological treatments  1) Continue with group therapy, milieu therapy and occupational therapy  Safety  1) Safety/communication plan established targeting dynamic risk factors above  2) Risks, benefits, and possible side effects of medications explained to patient and patient verbalizes understanding  Counseling / Coordination of Care    Total floor / unit time spent today 20 minutes  Greater than 50% of total time was spent with the patient and / or family counseling and / or coordination of care  A description of the counseling / coordination of care  Patient's Rights, confidentiality and exceptions to confidentiality, use of automated medical record, [facility] staff access to medical record, and consent to treatment reviewed      Lizandro Lee PA-C

## 2020-12-14 NOTE — ASSESSMENT & PLAN NOTE
· Patient complaining bilateral lower leg pain  · Encouraged use lotion  · Consult podiatry related to toenails and extremely dry and cracking bilateral feet

## 2020-12-14 NOTE — TREATMENT TEAM
Pt attended 1 afternoon group  Pt calm and sleepy in dayroom  Pt did not interact when prompted       12/14/20 1330   Activity/Group Checklist   Group Other (Comment)  (community supports and information)   Attendance Attended   Attendance Duration (min) 16-30   Interactions Other (Comment)  (slept)   Affect/Mood Calm   Goals Achieved Able to listen to others

## 2020-12-14 NOTE — PLAN OF CARE
Problem: OCCUPATIONAL THERAPY ADULT  Goal: Performs self-care activities at highest level of function for planned discharge setting  See evaluation for individualized goals  Description: Treatment Interventions: ADL retraining, Functional transfer training, UE strengthening/ROM, Endurance training, Continued evaluation, Activityengagement, Energy conservation          See flowsheet documentation for full assessment, interventions and recommendations  Outcome: Progressing  Note: Limitation: Decreased ADL status, Decreased UE strength, Decreased Safe judgement during ADL, Decreased cognition, Decreased endurance, Decreased high-level ADLs  Prognosis: Good  Assessment: Pt seen for OT txt  Pt continues to be confused and requires frequent redirection to task due to poor attention and safety awareness  Pt with progress today requiring Marc for transfers, limited by rigid posturing and narrow base of support  Pt assisted with toileting including clothing management maxA  Overall, Pt remains below her functional baseline, limited by decreased safety, cognition, functional strength, activity tolerance, and standing balance  Pt would benefit from continued OT services to further address deficits, recommend discharge to post acute rehab        OT Discharge Recommendation: Post-Acute Rehabilitation Services

## 2020-12-14 NOTE — PROGRESS NOTES
Progress Note - Denisha Esparza 1940, 79 y o  female MRN: [id number]  Unit/Bed#: Titi Jade 422-79 Encounter: [id number]  Primary Care Provider: Chance Rowland MD   Date and time admitted to hospital: 12/3/2020  6:01 PM    Chronic leg pain  Assessment & Plan  · Patient complaining bilateral lower leg pain  · Encouraged use lotion  · Consult podiatry related to toenails and extremely dry and cracking bilateral feet  Nurse Coordination of Care Discussion:  Bedside rounding performed    Discussions with Specialists or Other Care Team Provider:  None    Education and Discussions with Family / Patient:  I answered all patient's questions to the best of my ability    Time Spent for Care: 15 minutes  More than 50% of total time spent on counseling and coordination of care as described above  Current Length of Stay: 11 day(s)    Code Status: Level 1 - Full Code      Subjective:   Patient complaining to nursing staff regarding bilateral feet stating that they are painful and dry  Plus one pitting edema was noted by nursing staff and confirmed by myself  Patient has a history of chronic bilateral lower extremity pain    Objective:     Vitals:   Temp (24hrs), Av 2 °F (36 8 °C), Min:97 5 °F (36 4 °C), Max:98 7 °F (37 1 °C)    Temp:  [97 5 °F (36 4 °C)-98 7 °F (37 1 °C)] 97 5 °F (36 4 °C)  HR:  [80-87] 80  Resp:  [16-17] 16  BP: (114-125)/(56-72) 125/59  SpO2:  [92 %-97 %] 92 %  Body mass index is 22 81 kg/m²  Physical Exam:     Physical Exam  Vitals signs and nursing note reviewed  HENT:      Head: Normocephalic  Cardiovascular:      Rate and Rhythm: Normal rate and regular rhythm  Heart sounds: Normal heart sounds  No murmur  No friction rub  Pulmonary:      Effort: Pulmonary effort is normal  No respiratory distress  Breath sounds: Normal breath sounds  No stridor  No wheezing, rhonchi or rales  Abdominal:      General: Abdomen is flat  Palpations: Abdomen is soft     Feet: Right foot:      Skin integrity: Erythema, dry skin and fissure present  Toenail Condition: Right toenails are abnormally thick  Left foot:      Skin integrity: Erythema, dry skin and fissure present  Toenail Condition: Left toenails are abnormally thick  Comments: Mild +1 pitting edema noted bilaterally- likely dependent   Skin:     General: Skin is warm and dry  Capillary Refill: Capillary refill takes less than 2 seconds  Neurological:      Mental Status: She is alert  Additional Data:     Labs:        Results from last 7 days   Lab Units 12/10/20  0719   SODIUM mmol/L 139   POTASSIUM mmol/L 4 7   CHLORIDE mmol/L 104   CO2 mmol/L 31*   BUN mg/dL 48*   CREATININE mg/dL 1 06   ANION GAP mmol/L 4*   CALCIUM mg/dL 9 0   GLUCOSE RANDOM mg/dL 98                     * I Have Reviewed All Lab Data Listed Above  * Additional Pertinent Lab Tests Reviewed:  All Labs Within Last 24 Hours Reviewed    Imaging:    Imaging Reports Reviewed Today Include: none      Last 24 Hours Medication List:   Current Facility-Administered Medications   Medication Dose Route Frequency Provider Last Rate    acetaminophen  650 mg Oral Q6H PRN Evelyn Killian MD      cholecalciferol  1,000 Units Oral Daily Evelyn Killian MD      divalproex sodium  500 mg Oral HS Dante Reid MD      levothyroxine  25 mcg Oral Every Other Bard oJse MD      levothyroxine  50 mcg Oral Every Other Bard Jose MD      lithium carbonate  150 mg Oral QAM Deidre Yanes PA-C      loratadine  10 mg Oral Daily Evelyn Killian MD      LORazepam  0 5 mg Oral Q8H PRN Evelyn Killian MD      melatonin  10 mg Oral HS Evelyn Killian MD      metoprolol succinate  25 mg Oral BID Evelny Killian MD      OLANZapine  7 5 mg Oral HS Deidre Yanes PA-C      polyethylene glycol  17 g Oral Daily Evelyn Killian MD      senna-docusate sodium  1 tablet Oral HS Evelyn Killian MD      temazepam  7 5 mg Oral HS Joe Doctor, MD          Today, Patient Was Seen By: Sangita Cary      ** Please Note: Dictation voice to text software may have been used in the creation of this document   **

## 2020-12-14 NOTE — NURSING NOTE
Patient is medication and meal compliant  No complaints of pain or discomfort  Patient is unable to rate her depression or anxiety due to impaired cognition and diagnosis of Dementia  Patient did not attend group this morning  Appetite is fair with patient eating 50% of both breakfast and lunch  New orders for VPA level on 12/15/20 and increase Zyprexa to 7 5 mg PO q HS  No new concerns or issues noted at this time  Will continue to monitor patient for changes in mood or behavior

## 2020-12-14 NOTE — CONSULTS
Consult Routine Foot Care- Podiatry   Orlando Rahman 79 y o  female MRN: [id number]  Unit/Bed#: Kristin Faster 048-86 Encounter: [id number]    Assessment/Plan     Assessment:  1  Onychomycosis x 10  2  PVD  3  Edema b/l  4  Xerosis b/l     Plan:  - pt eval and managed today  - Hallucal mycotic nails x2 debrided decreasing thickness by 1 mm  Mycotic toe nails 2-5 b/l were trimmed, decreasing length, without incidence utilizing a sharp nail nipper  - I will order compression stockings for patient to be used daily  - Rx amlactin placed for pt  - All questions and concerns addressed  - Podiatry signing off, thank you for the consult  History of Present Illness     HPI:  Orlando Rahman is a 79 y o  female who presents with painful, elongated toenails and swelling  They state that they see no Podiatrist outpatient  They have difficulty applying their socks and shoes due to the elongation of the nails  The pressure within their shoe gear is painful and they have been unable to cut their nails adequately  Per nursing, they do get pt to elevate legs daily  Pt also with dry skin    Inpatient consult to Podiatry  Consult performed by: Carlos Morales DPM  Consult ordered by: Renee Faulkner MD        Review of Systems   Constitutional: Negative  HENT: Negative  Eyes: Negative  Respiratory: Negative  Cardiovascular: Negative  Gastrointestinal: Negative  Musculoskeletal: Negative   Skin: elongated thickened toenails, dry skin   Neurological: Negative          Historical Information   Past Medical History:   Diagnosis Date    [facility] depression [facility] [address] [facility]     COPD (chronic obstructive pulmonary disease) ([facility] [address] [facility]     Essential hypertension     Hypothyroidism     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HCC)      Past Surgical History:   Procedure Laterality Date    TONSILLECTOMY      TOTAL HIP ARTHROPLASTY Right      Social History   Social History     Substance and Sexual Activity   Alcohol Use Never    Frequency: Never    Binge frequency: Never     Social History     Substance and Sexual Activity   Drug Use No     Social History     Tobacco Use   Smoking Status Never Smoker   Smokeless Tobacco Never Used     Family History:   Family History   Problem Relation Age of Onset    Diabetes Mother     Cancer Brother        Meds/Allergies   Medications Prior to Admission   Medication    acetaminophen (TYLENOL) 500 mg tablet    chlorhexidine (PERIDEX) 0 12 % solution    cholecalciferol 1000 units tablet    divalproex sodium (DEPAKOTE ER) 500 mg 24 hr tablet    divalproex sodium (DEPAKOTE) 250 mg EC tablet    fexofenadine (ALLEGRA) 180 MG tablet    folic acid (FOLVITE) 1 mg tablet    hydrochlorothiazide (HYDRODIURIL) 25 mg tablet    ILEVRO 0 3 % SUSP    levothyroxine 25 mcg tablet    levothyroxine 50 mcg tablet    lithium carbonate 300 mg capsule    LORazepam (ATIVAN) 0 5 mg tablet    melatonin 3 mg    metoprolol succinate (TOPROL-XL) 25 mg 24 hr tablet    temazepam (RESTORIL) 30 mg capsule     Allergies   Allergen Reactions    Ampicillin Other (See Comments)     per t-system    Penicillins        Objective   First Vitals:   Blood Pressure: 158/75 (12/03/20 1801)  Pulse: 85 (12/03/20 1801)  Temperature: 98 2 °F (36 8 °C) (12/03/20 1801)  Temp Source: Tympanic (12/03/20 1801)  Respirations: 18 (12/03/20 1801)  Height: 5' 4" (162 6 cm) (12/03/20 1801)  Weight - Scale: 59 5 kg (131 lb 2 8 oz) (12/03/20 1801)  SpO2: 97 % (12/03/20 1900)    Current Vitals:   Blood Pressure: 125/59 (12/14/20 0701)  Pulse: 80 (12/14/20 0701)  Temperature: 97 5 °F (36 4 °C) (12/14/20 0701)  Temp Source: Temporal (12/14/20 0701)  Respirations: 16 (12/14/20 0701)  Height: 5' 4" (162 6 cm) (12/07/20 1305)  Weight - Scale: 60 3 kg (132 lb 14 4 oz) (12/14/20 0600)  SpO2: 92 % (12/14/20 0701)    /59 (BP Location: Right arm)   Pulse 80   Temp 97 5 °F (36 4 °C) (Temporal)   Resp 16   Ht 5' 4" (1 626 m)   Wt 60 3 kg (132 lb 14 4 oz)   SpO2 92% BMI 22 81 kg/m²     General Appearance:    Alert, cooperative, no distress   Head:    Normocephalic, without obvious abnormality, atraumatic   Eyes:    PERRL, conjunctiva/corneas clear, EOM's intact            Nose:   Moist mucous membranes, no drainage or sinus tenderness   Throat:   No tenderness, no exudates   Neck:   Supple, symmetrical, trachea midline, no JVD   Back:     Symmetric, no CVA tenderness   Lungs:     Clear to auscultation bilaterally, respirations unlabored   Chest wall:    No tenderness or deformity   Heart:    Regular rate and rhythm, S1 and S2 normal, no murmur, rub   or gallop   Abdomen:     Soft, non-tender, bowel sounds active all four quadrants,     no masses, no organomegaly     Extremities:   MMT is 4/5 to all compartments of the LE, +2/4 edema B/L, Digital ROM is intact,    Pulses:   R DP is +1/4, R PT is +1/4, L DP is +1/4, L PT is +1/4, CFT< 3sec to all digits  Thin/shiny skin noted to the B/L LE, pigmentary changes to B/L LE  Absent digital hair growth b/l  Nail thickened b/l   Skin:   Nails are yellow discolored, thickened, elongated, with notable subungual debris and > 2 mm thickness noted to toenails 1-5 B/L  No open Lesions  Dry, scaley skin to b/l plantar feet       Neurologic:   CNII-XII intact  Normal strength, sensation and reflexes       Throughout  Gross sensation is intact   Protective sensation is diminished       Lab Results:   Admission on 12/03/2020   Component Date Value    Sodium 12/04/2020 140     Potassium 12/04/2020 4 7     Chloride 12/04/2020 105     CO2 12/04/2020 34*    ANION GAP 12/04/2020 1*    BUN 12/04/2020 20     Creatinine 12/04/2020 0 97     Glucose 12/04/2020 103*    Calcium 12/04/2020 9 4     eGFR 12/04/2020 55*    Lithium Lvl 12/04/2020 0 6     Lithium Lvl 12/06/2020 0 8     Valproic Acid, Total 12/06/2020 70 3     Lithium Lvl 12/10/2020 0 7     Sodium 12/10/2020 139     Potassium 12/10/2020 4 7     Chloride 12/10/2020 104     CO2 12/10/2020 31*    ANION GAP 12/10/2020 4*    BUN 12/10/2020 48*    Creatinine 12/10/2020 1 06     Glucose 12/10/2020 98     Glucose, Fasting 12/10/2020 98     Calcium 12/10/2020 9 0     eGFR 12/10/2020 49*     Imaging: I have personally reviewed pertinent films in PACS  EKG, Pathology, and Other Studies: I have personally reviewed pertinent reports        Code Status: Level 1 - Full Code  Advance Directive and Living Will:      Power of :    POLST:

## 2020-12-14 NOTE — CASE MANAGEMENT
[facility] can accept for STR but won't have an available bed until Thurs or Fri   CM to follow up     LOC assessment completed via phone with Swapnil Arreguin from [address] [place] [address]

## 2020-12-14 NOTE — PLAN OF CARE
Problem: Alteration in Thoughts and Perception  Goal: Attend and participate in unit activities, including therapeutic, recreational, and educational groups  Description: Interventions:  -Encourage Visitation and family involvement in care  Outcome: Not Progressing

## 2020-12-14 NOTE — CASE MANAGEMENT
12/14/20 0840   Team Meeting   Meeting Type Daily Rounds   Initial Conference Date 12/14/20   Team Members Present   Team Members Present Physician;Nurse;;Occupational Therapist   Physician Team Member [place], [place]   Nursing Team Member [address] Management Team Member Madalyn   Patient/Family Present   Patient Present No   Patient's Family Present No     R leg edema, irritable this morning, med compliant, cooperative with care, no paranoid/suspicious statements about  over the weekend, awaiting STR placement

## 2020-12-14 NOTE — NURSING NOTE
Patient remains alert and oriented with confusion per her baseline  +3 pitting edema to her right foot is noted  The hospital[facility]  [facility]ist  will be in to assess pt

## 2020-12-15 LAB — VALPROATE SERPL-MCNC: 63.8 UG/ML (ref 50–120)

## 2020-12-15 PROCEDURE — 80164 ASSAY DIPROPYLACETIC ACD TOT: CPT | Performed by: PHYSICIAN ASSISTANT

## 2020-12-15 RX ORDER — QUETIAPINE FUMARATE 100 MG/1
100 TABLET, FILM COATED ORAL
Status: DISCONTINUED | OUTPATIENT
Start: 2020-12-15 | End: 2020-12-16

## 2020-12-15 RX ADMIN — SENNOSIDES AND DOCUSATE SODIUM 1 TABLET: 8.6; 5 TABLET ORAL at 21:29

## 2020-12-15 RX ADMIN — CHOLECALCIFEROL TAB 25 MCG (1000 UNIT) 1000 UNITS: 25 TAB at 08:51

## 2020-12-15 RX ADMIN — LITHIUM CARBONATE 150 MG: 150 CAPSULE, GELATIN COATED ORAL at 08:51

## 2020-12-15 RX ADMIN — Medication 10 MG: at 21:25

## 2020-12-15 RX ADMIN — METOPROLOL SUCCINATE 25 MG: 25 TABLET, EXTENDED RELEASE ORAL at 21:29

## 2020-12-15 RX ADMIN — Medication: at 08:53

## 2020-12-15 RX ADMIN — DIVALPROEX SODIUM 500 MG: 125 CAPSULE, COATED PELLETS ORAL at 21:27

## 2020-12-15 RX ADMIN — METOPROLOL SUCCINATE 25 MG: 25 TABLET, EXTENDED RELEASE ORAL at 08:51

## 2020-12-15 RX ADMIN — LORATADINE 10 MG: 10 TABLET ORAL at 08:51

## 2020-12-15 RX ADMIN — QUETIAPINE FUMARATE 100 MG: 100 TABLET ORAL at 21:30

## 2020-12-15 RX ADMIN — TEMAZEPAM 7.5 MG: 7.5 CAPSULE ORAL at 21:28

## 2020-12-15 RX ADMIN — POLYETHYLENE GLYCOL 3350 17 G: 17 POWDER, FOR SOLUTION ORAL at 08:51

## 2020-12-15 RX ADMIN — Medication 1 APPLICATION: at 17:50

## 2020-12-15 NOTE — PROGRESS NOTES
Pt attended all groups  Pt was anxious and focused on putting on socks during group  In afternoon pt was sleepy during group and did not interact  Pt did stay for duration       12/15/20 1330   Activity/Group Checklist   Group Other (Comment)  (positive reflection)   Attendance Attended   Attendance Duration (min) 46-60   Interactions Other (Comment)  (slept)   Affect/Mood Blunted/flat   Goals Achieved Able to listen to others

## 2020-12-15 NOTE — PLAN OF CARE
Problem: Prexisting or High Potential for Compromised Skin Integrity  Goal: Skin integrity is maintained or improved  Description: INTERVENTIONS:  - Identify patients at risk for skin breakdown  - Assess and monitor skin integrity  - Assess and monitor nutrition and hydration status  - Monitor labs   - Assess for incontinence   - Turn and reposition patient  - Assist with mobility/ambulation  - Relieve pressure over bony prominences  - Avoid friction and shearing  - Provide appropriate hygiene as needed including keeping skin clean and dry  - Evaluate need for skin moisturizer/barrier cream  - Collaborate with interdisciplinary team   - Patient/family teaching  - Consider wound care consult   Outcome: Progressing     Problem: Alteration in Thoughts and Perception  Goal: Treatment Goal: Gain control of psychotic behaviors/thinking, reduce/eliminate presenting symptoms and demonstrate improved reality functioning upon discharge  Outcome: Progressing  Goal: Verbalize thoughts and feelings  Description: Interventions:  - Promote a nonjudgmental and trusting relationship with the patient through active listening and therapeutic communication  - Assess patient's level of functioning, behavior and potential for risk  - Engage patient in 1 on 1 interactions  - Encourage patient to express fears, feelings, frustrations, and discuss symptoms    - [place] patient to reality, help patient recognize reality-based thinking   - Administer medications as ordered and assess for potential side effects  - Provide the patient education related to the signs and symptoms of the illness and desired effects of prescribed medications  Outcome: Progressing  Goal: Refrain from acting on delusional thinking/internal stimuli  Description: Interventions:  - Monitor patient closely, per order   - Utilize least restrictive measures   - Set reasonable limits, give positive feedback for acceptable   - Administer medications as ordered and monitor of potential side effects  Outcome: Progressing  Goal: Agree to be compliant with medication regime, as prescribed and report medication side effects  Description: Interventions:  - Offer appropriate PRN medication and supervise ingestion; conduct AIMS, as needed   Outcome: Progressing  Goal: Attend and participate in unit activities, including therapeutic, recreational, and educational groups  Description: Interventions:  -Encourage Visitation and family involvement in care  Outcome: Progressing  Goal: Recognize dysfunctional thoughts, communicate reality-based thoughts at the time of discharge  Description: Interventions:  - Provide medication and psycho-education to assist patient in compliance and developing insight into his/her illness   Outcome: Progressing  Goal: Complete daily ADLs, including personal hygiene independently, as able  Description: Interventions:  - Observe, teach, and assist patient with ADLS  - Monitor and promote a balance of rest/activity, with adequate nutrition and elimination   Outcome: Progressing     Problem: Ineffective Coping  Goal: Cooperates with admission process  Description: Interventions:   - Complete admission process  Outcome: Progressing  Goal: Identifies ineffective coping skills  Outcome: Progressing  Goal: Identifies healthy coping skills  Outcome: Progressing  Goal: Demonstrates healthy coping skills  Outcome: Progressing  Goal: Patient/Family participate in treatment and DC plans  Description: Interventions:  - Provide therapeutic environment  Outcome: Progressing  Goal: Patient/Family verbalizes awareness of resources  Outcome: Progressing  Goal: Understands least restrictive measures  Description: Interventions:  - Utilize least restrictive behavior  Outcome: Progressing  Goal: Free from restraint events  Description: - Utilize least restrictive measures   - Provide behavioral interventions   - Redirect inappropriate behaviors   Outcome: Progressing     Problem: Alteration in Orientation  Goal: Treatment Goal: Demonstrate a reduction of confusion and improved orientation to person, place, time and/or situation upon discharge, according to optimum baseline/ability  Outcome: Progressing  Goal: Interact with staff daily  Description: Interventions:  - Assess and re-assess patient's level of orientation  - Engage patient in 1 on 1 interactions, daily, for a minimum of 15 minutes   - Establish rapport/trust with patient   Outcome: Progressing  Goal: Express concerns related to confused thinking related to:  Description: Interventions:  - Encourage patient to express feelings, fears, frustrations, hopes  - Assign consistent caregivers   - [place]/re-orient patient as needed  - Allow comfort items, as appropriate  - Provide visual cues, signs, etc    Outcome: Progressing  Goal: Allow medical examinations, as recommended  Description: Interventions:  - Provide physical/neurological exams and/or referrals, per provider   Outcome: Progressing  Goal: Cooperate with recommended testing/procedures  Description: Interventions:  - Determine need for ancillary testing  - Observe for mental status changes  - Implement falls/precaution protocol   Outcome: Progressing  Goal: Complete daily ADLs, including personal hygiene independently, as able  Description: Interventions:  - Observe, teach, and assist patient with ADLS  Outcome: Progressing     Problem: Ineffective Coping  Goal: Participates in unit activities  Description: Interventions:  - Provide therapeutic environment   - Provide required programming   - Redirect inappropriate behaviors   Outcome: Progressing     Problem: JULIO C  Goal: Will exhibit normal sleep and speech and no impulsivity  Description: INTERVENTIONS:  - Administer medication as ordered  - Set limits on impulsive behavior  - Make attempts to decrease external stimuli as possible  Outcome: Progressing     Problem: DISCHARGE PLANNING  Goal: Discharge to home or other facility with appropriate resources  Description: INTERVENTIONS:  - Identify barriers to discharge w/patient and caregiver  - Arrange for needed discharge resources and transportation as appropriate  - Identify discharge learning needs (meds, wound care, etc )  - Arrange for interpretive services to assist at discharge as needed  - Refer to Case Management Department for coordinating discharge planning if the patient needs post-hospital services based on physician/advanced practitioner order or complex needs related to functional status, cognitive ability, or social support system  Outcome: Progressing

## 2020-12-15 NOTE — CASE MANAGEMENT
12/15/20 0833   Team Meeting   Meeting Type Daily Rounds   Initial Conference Date 12/15/20   Team Members Present   Team Members Present Physician;Nurse;   Physician Team Member Dr Dexter Marcus Team Member [facility] RN   Care Management Team Member Madalyn   Patient/Family Present   Patient Present No   Patient's Family Present No     Depakote level=63 8, med compliant, cooperative, mood lability, seems exhausted, med changes today, awaiting STR placement

## 2020-12-15 NOTE — NURSING NOTE
Patient is visible on the unit and compliant with medications  Patient is irritable but compliant  Patient was educated about MARTHA stockings and is wearing them now  No bilateral pitting edema noted  Skin dry,lotion used  Denies depression or anxiety

## 2020-12-15 NOTE — NURSING NOTE
Patient visible on the unit, social with peers and staff when approached  Patient unable to rate depression or anxiety, SI/HI AH/VH  Patient has a difficult time following assessment questions at times  Bright when approached, no signs of distress noted  VSS  Patient medication and meal compliant, appetite good  Will continue to monitor frequently and provide support as needed

## 2020-12-15 NOTE — PROGRESS NOTES
Psychiatry Progress Note    Subjective: Interval History     The patient is sitting up in bed this morning  She is being helped out of bed by staff member  Patient is irritable on approach  She states that people have been coming into her her room often  Informed patient this is for her safety  Patient has been very tired during the day per report had a difficult time staying awake in group yesterday  Discussed with Dr David Peñaloza and will discontinue Zyprexa and start Seroquel at HS  Patient denies feeling depressed or anxious this morning  She has been compliant with medications  Appetite has been fair  She did sleep well last evening  Patient had a Depakote level this morning which was therapeutic at 63 8       Behavior over the last 24 hours:  unchanged  Sleep: normal  Appetite: fair  Medication side effects: No  ROS: no complaints    Current medications:    Current Facility-Administered Medications:     acetaminophen (TYLENOL) tablet 650 mg, 650 mg, Oral, Q6H PRN, Ben Nielsen MD    ammonium lactate (LAC-HYDRIN) 12 % lotion, , Topical, BID, Emil Mejia DPM    cholecalciferol (VITAMIN D3) tablet 1,000 Units, 1,000 Units, Oral, Daily, Ben Nielsen MD, 1,000 Units at 12/14/20 0830    divalproex sodium (DEPAKOTE SPRINKLE) capsule 500 mg, 500 mg, Oral, HS, Karina Pope MD, 500 mg at 12/14/20 2156    levothyroxine tablet 25 mcg, 25 mcg, Oral, Every Other Day, Ben Nielsen MD, 25 mcg at 12/14/20 0511    levothyroxine tablet 50 mcg, 50 mcg, Oral, Every Other Day, Ben Nielsen MD, 50 mcg at 12/14/20 0511    lithium carbonate capsule 150 mg, 150 mg, Oral, QAMisty DAY PA-C, 150 mg at 12/14/20 0830    loratadine (CLARITIN) tablet 10 mg, 10 mg, Oral, Daily, Ben Nielsen MD, 10 mg at 12/14/20 0830    LORazepam (ATIVAN) tablet 0 5 mg, 0 5 mg, Oral, Q8H PRN, Ben Nielsen MD, 0 5 mg at 12/11/20 1517    melatonin tablet 10 mg, 10 mg, Oral, HS, Ben Nielsen MD, 10 mg at 12/14/20 2157    metoprolol succinate (TOPROL-XL) 24 hr tablet 25 mg, 25 mg, Oral, BID, Eugene Monk MD, 25 mg at 12/14/20 2156    OLANZapine (ZyPREXA) tablet 7 5 mg, 7 5 mg, Oral, HS, Roney Tenorio PA-C, 7 5 mg at 12/14/20 2156    polyethylene glycol (MIRALAX) packet 17 g, 17 g, Oral, Daily, Eugene Monk MD, 17 g at 12/14/20 0831    senna-docusate sodium (SENOKOT S) 8 6-50 mg per tablet 1 tablet, 1 tablet, Oral, HS, Eugene Monk MD, 1 tablet at 12/14/20 2156    temazepam (RESTORIL) capsule 7 5 mg, 7 5 mg, Oral, HS, Maddison Loera MD, 7 5 mg at 12/14/20 2157    Current Problem List:    Patient Active Problem List   Diagnosis    [facility]    Essential hypertension    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    Hypothyroidism    Bipolar depression ([facility]    Chronic leg pain    Seasonal allergies    Bilateral dry eyes    Age-related osteoporosis without current pathological fracture    ANNA MARIE (acute kidney injury) (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    Dementia ([facility]    Gait abnormality    History of Clostridium difficile infection    Menopause    Osteopenia    Thickened endometrium    Lithium toxicity    Abdominal distension    Medical clearance for psychiatric admission       Problem list reviewed 12/15/20     Objective:     Vital Signs:  Vitals:    12/14/20 0701 12/14/20 1518 12/14/20 2106 12/15/20 0649   BP: 125/59 127/60 139/61 133/61   BP Location: Right arm Left arm Right arm Right arm   Pulse: 80 81 80 79   Resp: 16 18 16 16   Temp: 97 5 °F (36 4 °C) 100 1 °F (37 8 °C) 99 9 °F (37 7 °C) 98 9 °F (37 2 °C)   TempSrc: Temporal Tympanic Temporal Temporal   SpO2: 92% 96% 96% 94%   Weight:       Height:             Appearance:  age appropriate and disheveled   Behavior:  guarded   Speech:  soft   Mood:  irritable   Affect:  labile   Thought Process:  loose associations   Thought Content:  delusions  persecutory   Perceptual Disturbances: None   Risk Potential: none Sensorium:  person and place   Cognition:  recent and remote memory: unable to assess due to lack of cooperation   Consciousness:  alert and awake    Attention: attention span and concentration were decreased   Intellect: average   Insight:  limited   Judgment: limited      Motor Activity: no abnormal movements       I/O Past 24 hours:  I/O last 3 completed shifts: In: 900 [P O :900]  Out: -   No intake/output data recorded  Labs:  Reviewed 12/15/20    Progress Toward Goals:  Unchanged    Assessment / Plan:     Bipolar 1 disorder (HCC)    Recommended Treatment:      Medication changes:  1) discontinue Zyprexa and start Seroquel 100 mg HS  Non-pharmacological treatments  1) Continue with group therapy, milieu therapy and occupational therapy  Safety  1) Safety/communication plan established targeting dynamic risk factors above  2) Risks, benefits, and possible side effects of medications explained to patient and patient verbalizes understanding  Counseling / Coordination of Care    Total floor / unit time spent today 20 minutes  Greater than 50% of total time was spent with the patient and / or family counseling and / or coordination of care  A description of the counseling / coordination of care  Patient's Rights, confidentiality and exceptions to confidentiality, use of automated medical record, [facility] staff access to medical record, and consent to treatment reviewed      Margret Reyes PA-C

## 2020-12-16 RX ADMIN — TEMAZEPAM 7.5 MG: 7.5 CAPSULE ORAL at 21:42

## 2020-12-16 RX ADMIN — CHOLECALCIFEROL TAB 25 MCG (1000 UNIT) 1000 UNITS: 25 TAB at 08:15

## 2020-12-16 RX ADMIN — LEVOTHYROXINE SODIUM 50 MCG: 25 TABLET ORAL at 06:10

## 2020-12-16 RX ADMIN — LEVOTHYROXINE SODIUM 25 MCG: 25 TABLET ORAL at 06:09

## 2020-12-16 RX ADMIN — LORATADINE 10 MG: 10 TABLET ORAL at 08:15

## 2020-12-16 RX ADMIN — SENNOSIDES AND DOCUSATE SODIUM 1 TABLET: 8.6; 5 TABLET ORAL at 21:42

## 2020-12-16 RX ADMIN — DIVALPROEX SODIUM 500 MG: 125 CAPSULE, COATED PELLETS ORAL at 21:41

## 2020-12-16 RX ADMIN — POLYETHYLENE GLYCOL 3350 17 G: 17 POWDER, FOR SOLUTION ORAL at 08:16

## 2020-12-16 RX ADMIN — METOPROLOL SUCCINATE 25 MG: 25 TABLET, EXTENDED RELEASE ORAL at 21:42

## 2020-12-16 RX ADMIN — QUETIAPINE FUMARATE 150 MG: 50 TABLET ORAL at 21:42

## 2020-12-16 RX ADMIN — Medication 10 MG: at 21:41

## 2020-12-16 RX ADMIN — Medication: at 12:29

## 2020-12-16 RX ADMIN — LITHIUM CARBONATE 150 MG: 150 CAPSULE, GELATIN COATED ORAL at 08:16

## 2020-12-16 RX ADMIN — Medication: at 19:00

## 2020-12-16 NOTE — NURSING NOTE
Pt resting comfortably in bed, pt reporting no complaints  Pt did have some small trouble taking the larger pills needing to drink half a cup of water to get them all down

## 2020-12-16 NOTE — PLAN OF CARE
Pt present for day room groups with more alertness today    Problem: Ineffective Coping  Goal: Participates in unit activities  Description: Interventions:  - Provide therapeutic environment   - Provide required programming   - Redirect inappropriate behaviors   Outcome: Progressing

## 2020-12-16 NOTE — CASE MANAGEMENT
[facility] has an available STR bed for pt on 12/18 and 12/21  CM will follow up to plan for d/c based on pt's readiness

## 2020-12-16 NOTE — CASE MANAGEMENT
12/16/20 0833   Team Meeting   Meeting Type Daily Rounds   Initial Conference Date 12/16/20   Team Members Present   Team Members Present Physician;Nurse;;Occupational Therapist   Physician Team Member [address] [place] [address]   Nursing Team Member [address] Management Team Member [place]   OT Team Member Shruthi Adams   Patient/Family Present   Patient Present No   Patient's Family Present No     Reports depression, irritable/angry at times, reports poor sleep, med compliant, cooperative, Seroquel was increased yesterday, Depakote level needed, awaiting STR placement

## 2020-12-16 NOTE — NURSING NOTE
Awake and oriented to self and place,reported poor sleep last night  Noted irritable edge on approach this morning  Pt reported some depression,denies any anxiety,denies any SI/HI/AH/VH  Med compliant  Noted to be attending groups  No distress noted  Will continue to monitor closely and provide support

## 2020-12-16 NOTE — PROGRESS NOTES
Pt attended 3 groups  Pt was visible but sleepy at times  Calm and less irritable  12/16/20 1300   Activity/Group Checklist   Group Other (Comment)  (music, gratitude and holiday review)   Attendance Attended   Attendance Duration (min) 31-45   Interactions Did not interact   Affect/Mood Other (Comment)  (sleepy)   Goals Achieved Identified feelings; Discussed coping strategies; Able to listen to others

## 2020-12-16 NOTE — CMS CERTIFICATION NOTE
Certification: Based upon physical, mental and social evaluations, I certify that inpatient psychiatric services are medically necessary for this patient for a duration of 21 midnights for the treatment of depression  Available alternative community resources do not meet the patient's mental health care needs  I further attest that an established written individualized plan of care has been implemented and is outlined in the patient's medical records

## 2020-12-16 NOTE — CASE MANAGEMENT
Left VM for pt's , Jeane Salcedo, to give update on d/c plan [facility] for STR)   Anticipating d/c on Monday

## 2020-12-16 NOTE — PROGRESS NOTES
Psychiatry Progress Note    Subjective: Interval History     The patient is sitting out on the unit this morning eating breakfast   She reports "Im feeling lousy   She states that she should not be here anymore and should be home  She has been very irritable  She reports she did not sleep well last evening  Informed patient that physical therapy is recommending short-term rehab  Patient became upset by this and states that nobody knows what she needs but herself  She does have very poor insight  She is forgetful  Case management received LOC determination letter which stated patient is nursing facility eligible      Behavior over the last 24 hours:  unchanged  Sleep: normal  Appetite: fair  Medication side effects: No  ROS: no complaints    Current medications:    Current Facility-Administered Medications:     acetaminophen (TYLENOL) tablet 650 mg, 650 mg, Oral, Q6H PRN, Carlos Forde MD    ammonium lactate (LAC-HYDRIN) 12 % lotion, , Topical, BID, Giovana Riley DPM, 1 application at 12/93/32 1750    cholecalciferol (VITAMIN D3) tablet 1,000 Units, 1,000 Units, Oral, Daily, Carlos Forde MD, 1,000 Units at 12/16/20 0815    divalproex sodium (DEPAKOTE SPRINKLE) capsule 500 mg, 500 mg, Oral, HS, Devika Ly MD, 500 mg at 12/15/20 2127    levothyroxine tablet 25 mcg, 25 mcg, Oral, Every Other Day, Carlos Forde MD, 25 mcg at 12/16/20 0609    levothyroxine tablet 50 mcg, 50 mcg, Oral, Every Other Day, Carlos Forde MD, 50 mcg at 12/16/20 0610    lithium carbonate capsule 150 mg, 150 mg, Oral, Misty STACK PA-C, 150 mg at 12/16/20 0816    loratadine (CLARITIN) tablet 10 mg, 10 mg, Oral, Daily, Carlos Forde MD, 10 mg at 12/16/20 0815    LORazepam (ATIVAN) tablet 0 5 mg, 0 5 mg, Oral, Q8H PRN, Carlos Forde MD, 0 5 mg at 12/11/20 1517    melatonin tablet 10 mg, 10 mg, Oral, HS, Carlos Forde MD, 10 mg at 12/15/20 2125    metoprolol succinate (TOPROL-XL) 24 hr tablet 25 mg, 25 mg, Oral, BID, Teetee Danielle MD, 25 mg at 12/15/20 2129    polyethylene glycol (MIRALAX) packet 17 g, 17 g, Oral, Daily, Teetee Danielle MD, 17 g at 12/16/20 0816    QUEtiapine (SEROquel) tablet 150 mg, 150 mg, Oral, HS, Patricia Galdamez PA-C    senna-docusate sodium (SENOKOT S) 8 6-50 mg per tablet 1 tablet, 1 tablet, Oral, HS, Teetee Danielle MD, 1 tablet at 12/15/20 2129    temazepam (RESTORIL) capsule 7 5 mg, 7 5 mg, Oral, HS, aLci Rosado MD, 7 5 mg at 12/15/20 2128    Current Problem List:    Patient Active Problem List   Diagnosis    [facility]    Essential hypertension    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[facility]    Hypothyroidism    Bipolar depression ([facility]    Chronic leg pain    Seasonal allergies    Bilateral dry eyes    Age-related osteoporosis without current pathological fracture    ANNA MARIE (acute kidney injury) ([facility]    Anxiety disorder    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[facility]    Dementia ([facility]    Gait abnormality    History of Clostridium difficile infection    Menopause    Osteopenia    Thickened endometrium    Lithium toxicity    Abdominal distension    Medical clearance for psychiatric admission       Problem list reviewed 12/16/20     Objective:     Vital Signs:  Vitals:    12/15/20 1109 12/15/20 1427 12/15/20 2120 12/16/20 0821   BP:  122/56 132/60 110/57   BP Location:  Left arm Left arm Left arm   Pulse:  88 78 80   Resp:  16 16    Temp:  99 2 °F (37 3 °C) 99 °F (37 2 °C) 98 2 °F (36 8 °C)   TempSrc:  Temporal Temporal Temporal   SpO2:  99% 98% 95%   Weight: 59 9 kg (132 lb 0 9 oz)      Height:             Appearance:  age appropriate and disheveled   Behavior:  guarded   Speech:  soft   Mood:  irritable   Affect:  labile   Thought Process:  loose associations   Thought Content:  delusions  persecutory   Perceptual Disturbances: None   Risk Potential: none   Sensorium:  person and place   Cognition:  recent and remote memory: unable to assess due to lack of cooperation Consciousness:  alert and awake    Attention: attention span and concentration were decreased   Intellect: average   Insight:  limited   Judgment: limited      Motor Activity: no abnormal movements       I/O Past 24 hours:  I/O last 3 completed shifts: In: 540 [P O :540]  Out: -   No intake/output data recorded  Labs:  Reviewed 12/16/20    Progress Toward Goals:  Unchanged    Assessment / Plan:     Bipolar 1 disorder (HCC)    Recommended Treatment:      Medication changes:  1) increase Seroquel to 150 mg HS  Non-pharmacological treatments  1) Continue with group therapy, milieu therapy and occupational therapy  Safety  1) Safety/communication plan established targeting dynamic risk factors above  2) Risks, benefits, and possible side effects of medications explained to patient and patient verbalizes understanding  Counseling / Coordination of Care    Total floor / unit time spent today 20 minutes  Greater than 50% of total time was spent with the patient and / or family counseling and / or coordination of care  A description of the counseling / coordination of care  Patient's Rights, confidentiality and exceptions to confidentiality, use of automated medical record, [address] [place] [address] staff access to medical record, and consent to treatment reviewed      Dennise Ocampo PA-C

## 2020-12-16 NOTE — PHYSICAL THERAPY NOTE
Physical Therapy cancellation note       12/16/20 1419   PT Last Visit   PT Visit Date 12/16/20   Note Type   Cancel Reasons Other  (pt refuses)   chart reviewed and pt cleared for treatment  Pt seated in dayroom across from nursing station  Pt refuses PT services today   "I am ok and that is that, you can go tell them that is what I said "   Nixon Kathleen, PT

## 2020-12-17 RX ADMIN — CHOLECALCIFEROL TAB 25 MCG (1000 UNIT) 1000 UNITS: 25 TAB at 08:22

## 2020-12-17 RX ADMIN — QUETIAPINE FUMARATE 175 MG: 100 TABLET ORAL at 21:34

## 2020-12-17 RX ADMIN — TEMAZEPAM 7.5 MG: 7.5 CAPSULE ORAL at 21:34

## 2020-12-17 RX ADMIN — METOPROLOL SUCCINATE 25 MG: 25 TABLET, EXTENDED RELEASE ORAL at 08:22

## 2020-12-17 RX ADMIN — Medication 1 APPLICATION: at 08:23

## 2020-12-17 RX ADMIN — LITHIUM CARBONATE 150 MG: 150 CAPSULE, GELATIN COATED ORAL at 08:22

## 2020-12-17 RX ADMIN — POLYETHYLENE GLYCOL 3350 17 G: 17 POWDER, FOR SOLUTION ORAL at 08:22

## 2020-12-17 RX ADMIN — LORATADINE 10 MG: 10 TABLET ORAL at 08:22

## 2020-12-17 RX ADMIN — Medication: at 19:00

## 2020-12-17 RX ADMIN — Medication 10 MG: at 21:34

## 2020-12-17 RX ADMIN — METOPROLOL SUCCINATE 25 MG: 25 TABLET, EXTENDED RELEASE ORAL at 21:33

## 2020-12-17 RX ADMIN — DIVALPROEX SODIUM 500 MG: 125 CAPSULE, COATED PELLETS ORAL at 21:34

## 2020-12-17 RX ADMIN — SENNOSIDES AND DOCUSATE SODIUM 1 TABLET: 8.6; 5 TABLET ORAL at 21:33

## 2020-12-17 NOTE — NURSING NOTE
Pt was very irritable this morning upon approach  Pt denies any depression or anxiety,denies any SI/HI/AH/VH  Med compliant  Noted to be attending groups  Pt noted to be walking with her walker  No distress noted  Will continue to monitor closely and provide support

## 2020-12-17 NOTE — PROGRESS NOTES
1  Psychiatry Progress Note    Subjective: Interval History     Patient continues to blame her  for putting her in the hospital and says Tania Dewitt is on a adalberto she remains irritable and pandey, less sedated now that the Zyprexa switched to Seroquel  But is not yet back to baseline but is improving      Behavior over the last 24 hours:  improved  Sleep: normal  Appetite: normal  Medication side effects: No  ROS: no complaints    Current medications:    Current Facility-Administered Medications:     acetaminophen (TYLENOL) tablet 650 mg, 650 mg, Oral, Q6H PRN, Shola Castle MD    ammonium lactate (LAC-HYDRIN) 12 % lotion, , Topical, BID, Sheila Solares DPM, 1 application at 29/88/38 6948    cholecalciferol (VITAMIN D3) tablet 1,000 Units, 1,000 Units, Oral, Daily, Shola Castle MD, 1,000 Units at 12/17/20 0822    divalproex sodium (DEPAKOTE SPRINKLE) capsule 500 mg, 500 mg, Oral, HS, Bailey Alonzo MD, 500 mg at 12/16/20 2141    levothyroxine tablet 25 mcg, 25 mcg, Oral, Every Other Day, Shola Castle MD, 25 mcg at 12/16/20 0609    levothyroxine tablet 50 mcg, 50 mcg, Oral, Every Other Day, Shola Castle MD, 50 mcg at 12/16/20 0610    lithium carbonate capsule 150 mg, 150 mg, Oral, QA, Misty Buck PA-C, 150 mg at 12/17/20 8496    loratadine (CLARITIN) tablet 10 mg, 10 mg, Oral, Daily, Shola Castle MD, 10 mg at 12/17/20 2596    LORazepam (ATIVAN) tablet 0 5 mg, 0 5 mg, Oral, Q8H PRN, Shola Castle MD, 0 5 mg at 12/11/20 1517    melatonin tablet 10 mg, 10 mg, Oral, HS, Shola Castle MD, 10 mg at 12/16/20 2141    metoprolol succinate (TOPROL-XL) 24 hr tablet 25 mg, 25 mg, Oral, BID, Shola Castle MD, 25 mg at 12/17/20 5953    polyethylene glycol (MIRALAX) packet 17 g, 17 g, Oral, Daily, Shola Castle MD, 17 g at 12/17/20 1779    QUEtiapine (SEROquel) tablet 175 mg, 175 mg, Oral, HS, Nely Hopson MD    senna-docusate sodium (SENOKOT S) 8 6-50 mg per tablet 1 tablet, 1 tablet, Oral, HS, Carlos Forde MD, 1 tablet at 12/16/20 2142    temazepam (RESTORIL) capsule 7 5 mg, 7 5 mg, Oral, HS, Florian Quinones MD, 7 5 mg at 12/16/20 2142    Current Problem List:    Patient Active Problem List   Diagnosis    [facility] ([facility]    Essential hypertension    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HCC)    Hypothyroidism    Bipolar depression ([facility]    Chronic leg pain    Seasonal allergies    Bilateral dry eyes    Age-related osteoporosis without current pathological fracture    ANNA MARIE (acute kidney injury) ([facility]    Anxiety disorder    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[facility]    Dementia ([facility]    Gait abnormality    History of Clostridium difficile infection    Menopause    Osteopenia    Thickened endometrium    Lithium toxicity    Abdominal distension    Medical clearance for psychiatric admission       Problem list reviewed 12/17/20     Objective:     Vital Signs:  Vitals:    12/16/20 2100 12/16/20 2142 12/17/20 0655 12/17/20 0900   BP: 134/60 122/72 125/58    BP Location: Right arm  Right arm    Pulse: 98 69 78    Resp: 16  16    Temp: 98 6 °F (37 °C)  98 9 °F (37 2 °C)    TempSrc: Temporal  Temporal    SpO2: 94%  91%    Weight:    59 6 kg (131 lb 6 3 oz)   Height:             Appearance:  age appropriate and casually dressed   Behavior:  normal   Speech:  normal volume   Mood:  depressed and irritable   Affect:  constricted   Thought Process:  normal   Thought Content:  delusions  persecutory   Perceptual Disturbances: None   Risk Potential: none   Sensorium:  person, place, situation and time   Cognition:  intact   Consciousness:  alert and awake    Attention: attention span and concentration were age appropriate   Intellect: average   Insight:  limited   Judgment: limited      Motor Activity: no abnormal movements       I/O Past 24 hours:  I/O last 3 completed shifts: In: 960 [P O :960]  Out: -   I/O this shift:   In: 5 [P O :540]  Out: -         Labs:  Reviewed 12/17/20    Progress Toward Goals: Slowly improving    Assessment / Plan:     Bipolar 1 disorder (HCC)    Recommended Treatment:      Medication changes:  1) increase HS Seroquel to 175 mg    Non-pharmacological treatments  1) Continue with group therapy, milieu therapy and occupational therapy  Safety  1) Safety/communication plan established targeting dynamic risk factors above  2) Risks, benefits, and possible side effects of medications explained to patient and patient verbalizes understanding  Counseling / Coordination of Care    Total floor / unit time spent today 20 minutes  Greater than 50% of total time was spent with the patient and / or family counseling and / or coordination of care  A description of the counseling / coordination of care  Patient's Rights, confidentiality and exceptions to confidentiality, use of automated medical record, [facility] staff access to medical record, and consent to treatment reviewed      Michael Linn MD

## 2020-12-17 NOTE — PROGRESS NOTES
Pt attended 3/3 groups  Pt sleepy most of groups  Pt irritable edge when initially waking up and sensitive to noises  12/17/20 1330   Activity/Group Checklist   Group Other (Comment)  (positive reflection and memory)   Attendance Attended   Attendance Duration (min) 46-60   Interactions Other (Comment)  (sleepy)   Affect/Mood Other (Comment)  (irritable after resting)   Goals Achieved Identified feelings; Discussed coping strategies; Discussed self-esteem issues; Able to listen to others

## 2020-12-17 NOTE — PLAN OF CARE
Problem: Alteration in Thoughts and Perception  Goal: Verbalize thoughts and feelings  Description: Interventions:  - Promote a nonjudgmental and trusting relationship with the patient through active listening and therapeutic communication  - Assess patient's level of functioning, behavior and potential for risk  - Engage patient in 1 on 1 interactions  - Encourage patient to express fears, feelings, frustrations, and discuss symptoms    - [place] patient to reality, help patient recognize reality-based thinking   - Administer medications as ordered and assess for potential side effects  - Provide the patient education related to the signs and symptoms of the illness and desired effects of prescribed medications  Outcome: Progressing  Goal: Attend and participate in unit activities, including therapeutic, recreational, and educational groups  Description: Interventions:  -Encourage Visitation and family involvement in care  Outcome: Progressing

## 2020-12-17 NOTE — NURSING NOTE
Patient is ambulatory with RW; monitored for safety with assist x1 as well  She called her  and after a minute or two she hung up on him and said "he's so stupid"  She is a bit irritable but softens on approach   She remains forgetful and lacks insight into her psychiatric condition

## 2020-12-17 NOTE — PROGRESS NOTES
12/17/20 1114   Team Meeting   Meeting Type Daily Rounds   Initial Conference Date 12/17/20   Next Conference Date 12/18/20   Team Members Present   Team Members Present Physician;Nurse;   Physician Team Member Gross   Nursing Team Member Bridget Lerma, [address] [place] [address] Management Team Member [place]   Patient/Family Present   Patient Present No   Patient's Family Present No   Med compliant, eats, slept, amb   W/ RW, med changes planned

## 2020-12-18 LAB — LITHIUM SERPL-SCNC: 0.4 MMOL/L (ref 0.6–1.2)

## 2020-12-18 PROCEDURE — 80178 ASSAY OF LITHIUM: CPT | Performed by: PSYCHIATRY & NEUROLOGY

## 2020-12-18 PROCEDURE — 97116 GAIT TRAINING THERAPY: CPT

## 2020-12-18 RX ORDER — QUETIAPINE FUMARATE 100 MG/1
200 TABLET, FILM COATED ORAL
Status: DISCONTINUED | OUTPATIENT
Start: 2020-12-18 | End: 2020-12-22 | Stop reason: HOSPADM

## 2020-12-18 RX ADMIN — Medication 10 MG: at 21:48

## 2020-12-18 RX ADMIN — SENNOSIDES AND DOCUSATE SODIUM 1 TABLET: 8.6; 5 TABLET ORAL at 21:48

## 2020-12-18 RX ADMIN — DIVALPROEX SODIUM 500 MG: 125 CAPSULE, COATED PELLETS ORAL at 21:48

## 2020-12-18 RX ADMIN — Medication: at 20:00

## 2020-12-18 RX ADMIN — Medication: at 08:44

## 2020-12-18 RX ADMIN — METOPROLOL SUCCINATE 25 MG: 25 TABLET, EXTENDED RELEASE ORAL at 21:48

## 2020-12-18 RX ADMIN — LITHIUM CARBONATE 150 MG: 150 CAPSULE, GELATIN COATED ORAL at 08:40

## 2020-12-18 RX ADMIN — METOPROLOL SUCCINATE 25 MG: 25 TABLET, EXTENDED RELEASE ORAL at 08:40

## 2020-12-18 RX ADMIN — LEVOTHYROXINE SODIUM 25 MCG: 25 TABLET ORAL at 05:38

## 2020-12-18 RX ADMIN — QUETIAPINE FUMARATE 200 MG: 100 TABLET ORAL at 21:48

## 2020-12-18 RX ADMIN — CHOLECALCIFEROL TAB 25 MCG (1000 UNIT) 1000 UNITS: 25 TAB at 08:40

## 2020-12-18 RX ADMIN — TEMAZEPAM 7.5 MG: 7.5 CAPSULE ORAL at 21:48

## 2020-12-18 RX ADMIN — LEVOTHYROXINE SODIUM 50 MCG: 25 TABLET ORAL at 05:39

## 2020-12-18 RX ADMIN — LORATADINE 10 MG: 10 TABLET ORAL at 08:40

## 2020-12-18 NOTE — PROGRESS NOTES
Psychiatry Progress Note    Subjective: Interval History     Does not seem quite as irritable this morning or argumentative and not the fixated on her  all of which are good signs that she is slowly improving  She is tolerating a slow increase in Seroquel without any side effects  Depakote level is within normal limits  Lithium 0 5 and I am keeping not the level on the low side to prevent the renal side effects      Behavior over the last 24 hours:  improved  Sleep: normal  Appetite: normal  Medication side effects: No  ROS: no complaints    Current medications:    Current Facility-Administered Medications:     acetaminophen (TYLENOL) tablet 650 mg, 650 mg, Oral, Q6H PRN, Teetee Danielle MD    ammonium lactate (LAC-HYDRIN) 12 % lotion, , Topical, BID, Jose A Perdomo DPM, Given at 12/18/20 0844    cholecalciferol (VITAMIN D3) tablet 1,000 Units, 1,000 Units, Oral, Daily, Teetee Danielle MD, 1,000 Units at 12/18/20 0840    divalproex sodium (DEPAKOTE SPRINKLE) capsule 500 mg, 500 mg, Oral, HS, Ryan Graf MD, 500 mg at 12/17/20 2134    levothyroxine tablet 25 mcg, 25 mcg, Oral, Every Other Day, Teetee Danielle MD, 25 mcg at 12/18/20 0538    levothyroxine tablet 50 mcg, 50 mcg, Oral, Every Other Day, Teetee Danielle MD, 50 mcg at 12/18/20 0539    lithium carbonate capsule 150 mg, 150 mg, Oral, QAM, Misty Buck PA-C, 150 mg at 12/18/20 0840    loratadine (CLARITIN) tablet 10 mg, 10 mg, Oral, Daily, Teetee Danielle MD, 10 mg at 12/18/20 0840    LORazepam (ATIVAN) tablet 0 5 mg, 0 5 mg, Oral, Q8H PRN, Teetee Danielle MD, 0 5 mg at 12/11/20 1517    melatonin tablet 10 mg, 10 mg, Oral, HS, Teetee Danielle MD, 10 mg at 12/17/20 2134    metoprolol succinate (TOPROL-XL) 24 hr tablet 25 mg, 25 mg, Oral, BID, Teetee Danielle MD, 25 mg at 12/18/20 0840    polyethylene glycol (MIRALAX) packet 17 g, 17 g, Oral, Daily, Teetee Danielle MD, 17 g at 12/17/20 3620    QUEtiapine (SEROquel) tablet 200 mg, 200 mg, Oral, ESPERANZA, Zully Chandra MD    senna-docusate sodium (SENOKOT S) 8 6-50 mg per tablet 1 tablet, 1 tablet, Oral, ESPERANZA, Hiren Berman MD, 1 tablet at 12/17/20 2133    temazepam (RESTORIL) capsule 7 5 mg, 7 5 mg, Oral, HS, Zully Chandra MD, 7 5 mg at 12/17/20 2134    Current Problem List:    Patient Active Problem List   Diagnosis    B[facility] disorder ([facility] [address] )    Essential hypertension    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HCC)    Hypothyroidism    Bipolar depression (HCC)    Chronic leg pain    Seasonal allergies    Bilateral dry eyes    Age-related osteoporosis without current pathological fracture    [facility]    Anxiety disorder    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    Dementia ([facility] )    Gait abnormality    History of Clostridium difficile infection    Menopause    Osteopenia    Thickened endometrium    Lithium toxicity    Abdominal distension    Medical clearance for psychiatric admission       Problem list reviewed 12/18/20     Objective:     Vital Signs:  Vitals:    12/17/20 0900 12/17/20 1420 12/17/20 2133 12/18/20 0700   BP:  117/53 122/58 135/63   BP Location:  Right arm Right arm Right arm   Pulse:  83 89 70   Resp:  16 18 16   Temp:  99 5 °F (37 5 °C) 99 5 °F (37 5 °C) (!) 97 1 °F (36 2 °C)   TempSrc:  Temporal Temporal Tympanic   SpO2:  99% 95% 98%   Weight: 59 6 kg (131 lb 6 3 oz)      Height:             Appearance:  age appropriate and casually dressed   Behavior:  normal   Speech:  normal volume   Mood:  depressed   Affect:  constricted   Thought Process:  normal   Thought Content:  normal   Perceptual Disturbances: None   Risk Potential: none   Sensorium:  person, place, situation and time   Cognition:  intact   Consciousness:  alert and awake    Attention: attention span and concentration were age appropriate   Intellect: average   Insight:  limited   Judgment: limited      Motor Activity: no abnormal movements       I/O Past 24 hours:  I/O last 3 completed shifts:   In: 1300 [P O :1300]  Out: -   No intake/output data recorded  Labs:  Reviewed 12/18/20    Progress Toward Goals:  Improving slowly    Assessment / Plan:     Bipolar 1 disorder (HCC)    Recommended Treatment:      Medication changes:  1) increase Seroquel to 200 mg q h s  Non-pharmacological treatments  1) Continue with group therapy, milieu therapy and occupational therapy  Safety  1) Safety/communication plan established targeting dynamic risk factors above  2) Risks, benefits, and possible side effects of medications explained to patient and patient verbalizes understanding  Counseling / Coordination of Care    Total floor / unit time spent today 20 minutes  Greater than 50% of total time was spent with the patient and / or family counseling and / or coordination of care  A description of the counseling / coordination of care  Patient's Rights, confidentiality and exceptions to confidentiality, use of automated medical record, [facility] to medical record, and consent to treatment reviewed      Tal Joyce MD

## 2020-12-18 NOTE — CASE MANAGEMENT
Covid test will be ordered in preparation for d/c early next week  [facility] may need to push admission to Tuesday instead of Monday  Spoke with pt's , Antonella Parks, to give update  He is in agreement with [facility] placement but wants to make sure that pt is psych stable before she's discharged  Pt is still very angry/labile towards him during phone calls and believes that he is trying to "put her away "     Spoke with pt about d/c plan and STR placement  CM provided recommendation, education and encouragement  Pt continues to be upset with her  and believes that he put her in the hospital and is trying to "put her away" for good

## 2020-12-18 NOTE — CASE MANAGEMENT
12/18/20 1104   Team Meeting   Meeting Type Daily Rounds   Initial Conference Date 12/18/20   Team Members Present   Team Members Present Physician;   Physician Team Member Dr Benjamin Oliver Management Team Member Madalyn   Patient/Family Present   Patient Present No   Patient's Family Present No     STR placement at [facility] pending for Monday or Tuesday; slowly improving, irritable at times, med compliant, denies SI/AH/VH, attends groups, Depakote level within normal limits, Lithium level=0 5

## 2020-12-18 NOTE — PROGRESS NOTES
Pt attended 3/3 groups  Pt was irritable edge, sleepy in am but more alert and social in afternoon  Able to stay for duration  12/18/20 1330   Activity/Group Checklist   Group Other (Comment)  (positive reflection)   Attendance Attended   Attendance Duration (min) 31-45   Interactions Other (Comment)  (alert)   Affect/Mood Calm   Goals Achieved Able to listen to others; Able to engage in interactions; Able to self-disclose

## 2020-12-18 NOTE — PLAN OF CARE
Problem: PHYSICAL THERAPY ADULT  Goal: Performs mobility at highest level of function for planned discharge setting  See evaluation for individualized goals  Description: Treatment/Interventions: Functional transfer training, LE strengthening/ROM, Therapeutic exercise, Elevations, Cognitive reorientation, Patient/family training, Endurance training, Equipment eval/education, Bed mobility, Gait training, Spoke to nursing, Spoke to case management, OT  Equipment Recommended: Lupe Raya       See flowsheet documentation for full assessment, interventions and recommendations  Outcome: Progressing  Note: Prognosis: Good  Problem List: Decreased strength, Decreased range of motion, Decreased endurance, Impaired balance, Decreased mobility, Decreased coordination, Decreased cognition, Impaired judgement, Decreased safety awareness  Assessment: Pt seen today for PT treatment, found seated in common room alert and agreeable to therapy  She is reporting no pain at this time  She is making good progress in therapy increasing ambulation tolerance as well as stability of gait  She is no longer retropulsive  Able to manage RW around turns well with min VC  At end of treatment she was left seated in common room  Barriers to Discharge: Inaccessible home environment     PT Discharge Recommendation: [address]     PT - OK to Discharge: Yes    See flowsheet documentation for full assessment

## 2020-12-18 NOTE — PLAN OF CARE
Problem: Alteration in Thoughts and Perception  Goal: Attend and participate in unit activities, including therapeutic, recreational, and educational groups  Description: Interventions:  -Encourage Visitation and family involvement in care  Outcome: Progressing

## 2020-12-18 NOTE — NURSING NOTE
Patient was generally more pleasant and a bit less anxious today  Writer believes it has to do with her needs being met and her having more confidence in the staff  She is compliant with her medications and denies SI and AVH but has been heard to say about her  "I could kill him" (HI) He put me in here

## 2020-12-18 NOTE — PHYSICAL THERAPY NOTE
Physical TherapyTreatment Note    Patient's Name: Robert Fallon    Admitting Diagnosis  Major depressive disorder, single episode, unspecified [F32 9]    Problem List  Patient Active Problem List   Diagnosis    Bipolar 1 disorder ([place] [address] )    Essential hypertension    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[place] [address] )    Hypothyroidism    Bipolar depression ([place] [address] )    Chronic leg pain    Seasonal allergies    Bilateral dry eyes    Age-related osteoporosis without current pathological fracture    ANNA MARIE (acute kidney injury) ([place] [address] )    Anxiety disorder    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[address] )    Dementia ([place] [address] )    Gait abnormality    History of Clostridium difficile infection    Menopause    Osteopenia    Thickened endometrium    Lithium toxicity    Abdominal distension    Medical clearance for psychiatric admission       Past Medical History  Past Medical History:   Diagnosis Date    Bipolar depression ([place] [address] )     COPD (chronic obstructive pulmonary disease) ([place] [address] )     Essential hypertension     Hypothyroidism     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HCC)        Past Surgical History  Past Surgical History:   Procedure Laterality Date    TONSILLECTOMY      TOTAL HIP ARTHROPLASTY Right        Recent Imaging  No orders to display       Recent Vital Signs  Vitals:    12/17/20 1420 12/17/20 2133 12/18/20 0700 12/18/20 1500   BP: 117/53 122/58 135/63 96/59   BP Location: Right arm Right arm Right arm Right arm   Pulse: 83 89 70 82   Resp: 16 18 16 16   Temp: 99 5 °F (37 5 °C) 99 5 °F (37 5 °C) (!) 97 1 °F (36 2 °C) 98 °F (36 7 °C)   TempSrc: Temporal Temporal Tympanic Temporal   SpO2: 99% 95% 98% 96%   Weight:       Height:            12/18/20 1500   PT Last Visit   PT Visit Date 12/18/20   Note Type   Note Type Treatment   Pain Assessment   Pain Assessment Tool 0-10   Pain Score No Pain   Restrictions/Precautions   Weight Bearing Precautions Per Order No   Other Precautions Cognitive; Chair Alarm; Bed Alarm   General   Chart Reviewed Yes   Response to Previous Treatment Patient with no complaints from previous session  Family/Caregiver Present No   Cognition   Arousal/Participation Alert   Attention Attends with cues to redirect   Orientation Level Oriented to person;Oriented to situation   Memory Decreased recall of precautions;Decreased recall of recent events   Following Commands Follows one step commands with increased time or repetition   Subjective   Subjective I will go for a walk im feeling better now   Transfers   Sit to Stand 5  Supervision   Additional items Armrests; Increased time required   Stand to Sit 5  Supervision   Additional items Armrests; Increased time required   Additional Comments with RW   Ambulation/Elevation   Gait pattern Excessively slow; Short stride;Decreased foot clearance   Gait Assistance 5  Supervision   Additional items Verbal cues   Assistive Device Rolling walker   Distance 200ft   Balance   Static Sitting Fair +   Dynamic Sitting Fair   Static Standing Fair   Dynamic Standing Fair -   Ambulatory Fair -   Endurance Deficit   Endurance Deficit Yes   Endurance Deficit Description improving however still reduced   Activity Tolerance   Activity Tolerance Patient tolerated treatment well   Nurse Made Aware spoke to RN   Assessment   Prognosis Good   Problem List Decreased strength;Decreased range of motion;Decreased endurance; Impaired balance;Decreased mobility; Decreased coordination;Decreased cognition; Impaired judgement;Decreased safety awareness   Assessment Pt seen today for PT treatment, found seated in common room alert and agreeable to therapy  She is reporting no pain at this time  She is making good progress in therapy increasing ambulation tolerance as well as stability of gait  She is no longer retropulsive  Able to manage RW around turns well with min VC  At end of treatment she was left seated in common room     Barriers to Discharge Inaccessible home environment   Goals   Patient Goals to go for a walk   STG Expiration Date 12/23/20   PT Treatment Day 1   Plan   Treatment/Interventions Functional transfer training;LE strengthening/ROM; Elevations; Therapeutic exercise; Endurance training;Patient/family training;Equipment eval/education; Bed mobility;Gait training;Spoke to nursing;Spoke to case management;OT   Progress Progressing toward goals   PT Frequency 2-3x/wk   Recommendation   PT Discharge Recommendation Post-Acute Rehabilitation Services   Equipment Recommended Walker   PT - OK to Discharge Yes   Additional Comments to rehab when medically cleared   AM-PAC Basic Mobility Inpatient   Turning in Bed Without Bedrails 3   Lying on Back to Sitting on Edge of Flat Bed 3   Moving Bed to Chair 3   Standing Up From Chair 3   Walk in Room 3   Climb 3-5 Stairs 2   Basic Mobility Inpatient Raw Score 17   Basic Mobility Standardized Score 39 67       Willi Jenkins PT, DPT

## 2020-12-18 NOTE — NURSING NOTE
Up for breakfast with assistance,incontinenet of urine this morning  Pt is very irritable on approach early in am  Pt denies any depression or anxiety,denies SI/HI/AH/VH  Med compliant  Noted to be attending groups  Will continue to monitor closely and provide support

## 2020-12-19 PROCEDURE — 99232 SBSQ HOSP IP/OBS MODERATE 35: CPT | Performed by: NURSE PRACTITIONER

## 2020-12-19 RX ADMIN — METOPROLOL SUCCINATE 25 MG: 25 TABLET, EXTENDED RELEASE ORAL at 08:18

## 2020-12-19 RX ADMIN — CHOLECALCIFEROL TAB 25 MCG (1000 UNIT) 1000 UNITS: 25 TAB at 08:17

## 2020-12-19 RX ADMIN — QUETIAPINE FUMARATE 200 MG: 100 TABLET ORAL at 21:09

## 2020-12-19 RX ADMIN — Medication 10 MG: at 21:09

## 2020-12-19 RX ADMIN — LITHIUM CARBONATE 150 MG: 150 CAPSULE, GELATIN COATED ORAL at 08:17

## 2020-12-19 RX ADMIN — Medication 1 APPLICATION: at 08:16

## 2020-12-19 RX ADMIN — SENNOSIDES AND DOCUSATE SODIUM 1 TABLET: 8.6; 5 TABLET ORAL at 21:09

## 2020-12-19 RX ADMIN — METOPROLOL SUCCINATE 25 MG: 25 TABLET, EXTENDED RELEASE ORAL at 21:09

## 2020-12-19 RX ADMIN — LORATADINE 10 MG: 10 TABLET ORAL at 08:17

## 2020-12-19 RX ADMIN — Medication 1 APPLICATION: at 17:25

## 2020-12-19 RX ADMIN — TEMAZEPAM 7.5 MG: 7.5 CAPSULE ORAL at 21:09

## 2020-12-19 RX ADMIN — POLYETHYLENE GLYCOL 3350 17 G: 17 POWDER, FOR SOLUTION ORAL at 08:18

## 2020-12-19 RX ADMIN — DIVALPROEX SODIUM 500 MG: 125 CAPSULE, COATED PELLETS ORAL at 21:09

## 2020-12-19 NOTE — PROGRESS NOTES
Progress Note - [address] 79 y o  female MRN: [id number]  Unit/Bed#: [facility] [facility] 142-97 Encounter: [id number]    The patient was seen for continuing care and reviewed with treatment team   Staff reports the patient has been more cooperative and pleasant and less irritable  Today she reports she feels okay and slept through the night  States her appetite is good  She is confused and does not know why she is here  She states I did something my  did not like so the police brought me here" "I was just sitting in the living room talking on the phone with my grandson and then the police showed up"  No medication side effects      Bipolar 1 disorder ([facility] [address] )    Vital signs in last 24 hours:  Temp:  [97 °F (36 1 °C)-98 °F (36 7 °C)] 97 °F (36 1 °C)  HR:  [71-88] 71  Resp:  [16] 16  BP: ()/(58-60) 125/58    Mental Status Evaluation:    Appearance Adequate hygiene and grooming   Behavior calm and cooperative   Mood euthymic   Speech Normal rate and volume   Affect constricted   Thought Processes Goal directed and coherent   Thought Content Does not verbalize delusional material   Perceptual Disturbances Denies hallucinations and does not appear to be responding to internal stimuli   Risk Potential Suicidal/Homicidal Ideation - No evidence of suicidal or homicidal ideation and Patient does not verbalize suicidal or homicidal ideation  Risk of Violence - No evidence of risk for violence found on assessment  Risk of Self Mutilation - No evidence of risk for self mutilation found on assessment   Sensorium oriented to person and place   Cognition/Memory short term memory impaired   Consciousness alert and awake   Attention/Concentration attention span and concentration appear shorter than expected for age   Insight poor   Judgement limited   Muscle Strength and Gait/Station Not observed   Motor Activity no abnormal movements       Progress Toward Goals:  Improving      Recommended Treatment: Continue with pharmacotherapy, group therapy, milieu therapy and occupational therapy    The patient will be maintained on the following medications:  Current Facility-Administered Medications   Medication Dose Route Frequency Provider Last Rate    acetaminophen  650 mg Oral Q6H PRN Carlos Forde MD      ammonium lactate   Topical BID Giovana Mews, DPBARB      cholecalciferol  1,000 Units Oral Daily Carlos Forde MD      divalproex sodium  500 mg Oral HS Devika Ly MD      levothyroxine  25 mcg Oral Every Other Diamond Clayton MD      levothyroxine  50 mcg Oral Every Other Diamond Clayton MD      lithium carbonate  150 mg Oral QAM Hawk Patient, KYE      loratadine  10 mg Oral Daily Carlos Forde MD      LORazepam  0 5 mg Oral Q8H PRN Carlos Forde MD      melatonin  10 mg Oral HS Carlos Forde MD      metoprolol succinate  25 mg Oral BID Carlos Forde MD      polyethylene glycol  17 g Oral Daily Carlos Forde MD      QUEtiapine  200 mg Oral HS Florian Quinones MD      senna-docusate sodium  1 tablet Oral HS Carlos Forde MD      temazepam  7 5 mg Oral HS Florian Quinones MD         Bipolar 1 disorder [facility])

## 2020-12-19 NOTE — NURSING NOTE
Patient was medication and meal compliant  No complaints of pain or discomfort  Patient is isolative to her room and irritable with staff  Patient denies symptoms of depression 0/10 or anxiety 0/4  No aggressive behaviors noted this shift  Will continue to monitor patient for changes in mood or behavior

## 2020-12-19 NOTE — PLAN OF CARE
Problem: Prexisting or High Potential for Compromised Skin Integrity  Goal: Skin integrity is maintained or improved  Description: INTERVENTIONS:  - Identify patients at risk for skin breakdown  - Assess and monitor skin integrity  - Assess and monitor nutrition and hydration status  - Monitor labs   - Assess for incontinence   - Turn and reposition patient  - Assist with mobility/ambulation  - Relieve pressure over bony prominences  - Avoid friction and shearing  - Provide appropriate hygiene as needed including keeping skin clean and dry  - Evaluate need for skin moisturizer/barrier cream  - Collaborate with interdisciplinary team   - Patient/family teaching  - Consider wound care consult   Outcome: Progressing     Problem: Ineffective Coping  Goal: Cooperates with admission process  Description: Interventions:   - Complete admission process  Outcome: Completed  Goal: Identifies ineffective coping skills  Outcome: Not Progressing  Goal: Identifies healthy coping skills  Outcome: Progressing  Goal: Demonstrates healthy coping skills  Outcome: Progressing  Goal: Patient/Family participate in treatment and DC plans  Description: Interventions:  - Provide therapeutic environment  Outcome: Progressing  Goal: Patient/Family verbalizes awareness of resources  Outcome: Progressing  Goal: Understands least restrictive measures  Description: Interventions:  - Utilize least restrictive behavior  Outcome: Progressing  Goal: Free from restraint events  Description: - Utilize least restrictive measures   - Provide behavioral interventions   - Redirect inappropriate behaviors   Outcome: Progressing

## 2020-12-19 NOTE — NURSING NOTE
Patient was calm; pleasant; less irritable  She ambulates well with RW  She is compliant with medications   Very little socialization with peers or staff but brightens on approach

## 2020-12-20 LAB
FLUAV RNA RESP QL NAA+PROBE: NEGATIVE
FLUBV RNA RESP QL NAA+PROBE: NEGATIVE
RSV RNA RESP QL NAA+PROBE: NEGATIVE
SARS-COV-2 RNA RESP QL NAA+PROBE: NEGATIVE

## 2020-12-20 PROCEDURE — 0241U HB NFCT DS VIR RESP RNA 4 TRGT: CPT | Performed by: PSYCHIATRY & NEUROLOGY

## 2020-12-20 PROCEDURE — 99232 SBSQ HOSP IP/OBS MODERATE 35: CPT | Performed by: NURSE PRACTITIONER

## 2020-12-20 RX ADMIN — POLYETHYLENE GLYCOL 3350 17 G: 17 POWDER, FOR SOLUTION ORAL at 08:09

## 2020-12-20 RX ADMIN — QUETIAPINE FUMARATE 200 MG: 100 TABLET ORAL at 21:33

## 2020-12-20 RX ADMIN — CHOLECALCIFEROL TAB 25 MCG (1000 UNIT) 1000 UNITS: 25 TAB at 08:09

## 2020-12-20 RX ADMIN — LORATADINE 10 MG: 10 TABLET ORAL at 08:09

## 2020-12-20 RX ADMIN — LEVOTHYROXINE SODIUM 25 MCG: 25 TABLET ORAL at 05:25

## 2020-12-20 RX ADMIN — LITHIUM CARBONATE 150 MG: 150 CAPSULE, GELATIN COATED ORAL at 08:09

## 2020-12-20 RX ADMIN — Medication 1 APPLICATION: at 08:09

## 2020-12-20 RX ADMIN — TEMAZEPAM 7.5 MG: 7.5 CAPSULE ORAL at 21:34

## 2020-12-20 RX ADMIN — DIVALPROEX SODIUM 500 MG: 125 CAPSULE, COATED PELLETS ORAL at 21:33

## 2020-12-20 RX ADMIN — SENNOSIDES AND DOCUSATE SODIUM 1 TABLET: 8.6; 5 TABLET ORAL at 21:34

## 2020-12-20 RX ADMIN — Medication 10 MG: at 21:34

## 2020-12-20 RX ADMIN — Medication: at 17:26

## 2020-12-20 RX ADMIN — METOPROLOL SUCCINATE 25 MG: 25 TABLET, EXTENDED RELEASE ORAL at 08:09

## 2020-12-20 RX ADMIN — LEVOTHYROXINE SODIUM 50 MCG: 25 TABLET ORAL at 05:25

## 2020-12-20 NOTE — NURSING NOTE
Patient was isolative to her room all evening  Patient denied having any depression, anxiety, hallucinations or suicidal/homicidal thoughts  She stated, "There is no need for me to be here "  When told that a COVID test needed to be collected in the morning, patient stated, "Why do I need that? I'm going home "  Patient was cooperative with taking his scheduled evening medications  Safety plan was reviewed with the patient and staff availability was reinforced

## 2020-12-20 NOTE — PLAN OF CARE
Problem: Alteration in Thoughts and Perception  Goal: Verbalize thoughts and feelings  Description: Interventions:  - Promote a nonjudgmental and trusting relationship with the patient through active listening and therapeutic communication  - Assess patient's level of functioning, behavior and potential for risk  - Engage patient in 1 on 1 interactions  - Encourage patient to express fears, feelings, frustrations, and discuss symptoms    - Abell patient to reality, help patient recognize reality-based thinking   - Administer medications as ordered and assess for potential side effects  - Provide the patient education related to the signs and symptoms of the illness and desired effects of prescribed medications  Outcome: Progressing  Goal: Refrain from acting on delusional thinking/internal stimuli  Description: Interventions:  - Monitor patient closely, per order   - Utilize least restrictive measures   - Set reasonable limits, give positive feedback for acceptable   - Administer medications as ordered and monitor of potential side effects  Outcome: Progressing  Goal: Agree to be compliant with medication regime, as prescribed and report medication side effects  Description: Interventions:  - Offer appropriate PRN medication and supervise ingestion; conduct AIMS, as needed   Outcome: Progressing  Goal: Attend and participate in unit activities, including therapeutic, recreational, and educational groups  Description: Interventions:  -Encourage Visitation and family involvement in care  Outcome: Progressing     Problem: Alteration in Thoughts and Perception  Goal: Treatment Goal: Gain control of psychotic behaviors/thinking, reduce/eliminate presenting symptoms and demonstrate improved reality functioning upon discharge  Outcome: Not Progressing  Goal: Recognize dysfunctional thoughts, communicate reality-based thoughts at the time of discharge  Description: Interventions:  - Provide medication and psycho-education to assist patient in compliance and developing insight into his/her illness   Outcome: Not Progressing  Goal: Complete daily ADLs, including personal hygiene independently, as able  Description: Interventions:  - Observe, teach, and assist patient with ADLS  - Monitor and promote a balance of rest/activity, with adequate nutrition and elimination   Outcome: Not Progressing

## 2020-12-20 NOTE — NURSING NOTE
Pt calm, pleasant on approach, and medication adherent  Pt denies all symptoms this morning and does not appear to be responding to internal stimuli  Pt reports difficulty falling asleep and endorses good energy and appetite  Pt states that she is eager for discharge  Spoke with pts  on the phone  Stated that he does not want pt to be discharged to [facility] due to high Covid mortality rates and will take care of pts needs at home

## 2020-12-20 NOTE — PROGRESS NOTES
Progress Note - [address]oya 79 y o  female MRN: [id number]  Unit/Bed#: Titi Jade 987-04 Encounter: [id number]    The patient was seen for continuing care and reviewed with treatment team   Staff reports the patient has been calm, pleasant, and cooperative  She has been medication compliant  Apparently her  called and told staff that he no longer agrees with [facility] placement and wants to bring her home  On approach she is very animated and friendly  She says she is fine and is sleeping and eating well  No particular complaints  Did not make any paranoid statements this morning      Bipolar 1 disorder (HCC)    Vital signs in last 24 hours:  Temp:  [97 3 °F (36 3 °C)-98 °F (36 7 °C)] 98 °F (36 7 °C)  HR:  [96-99] 99  Resp:  [16-20] 16  BP: (124-130)/(52-61) 130/52    Mental Status Evaluation:    Appearance Adequate hygiene and grooming   Behavior calm and cooperative   Mood euthymic   Speech Normal rate and volume   Affect mood-congruent   Thought Processes Goal directed and coherent   Thought Content Does not verbalize delusional material   Perceptual Disturbances Denies hallucinations and does not appear to be responding to internal stimuli   Risk Potential Suicidal/Homicidal Ideation - No evidence of suicidal or homicidal ideation and Patient does not verbalize suicidal or homicidal ideation  Risk of Violence - No evidence of risk for violence found on assessment  Risk of Self Mutilation - No evidence of risk for self mutilation found on assessment   Sensorium oriented to person and place   Cognition/Memory short term memory impaired   Consciousness alert and awake   Attention/Concentration attention span and concentration appear shorter than expected for age   Insight poor   Judgement poor   Muscle Strength and Gait/Station Not observed   Motor Activity no abnormal movements       Progress Toward Goals:  Progressing      Recommended Treatment: Continue with pharmacotherapy, group therapy, milieu therapy and occupational therapy    The patient will be maintained on the following medications:  Current Facility-Administered Medications   Medication Dose Route Frequency Provider Last Rate    acetaminophen  650 mg Oral Q6H PRN Marielos Zimmerman MD      ammonium lactate   Topical BID Rubi Robpaulina, DPM      cholecalciferol  1,000 Units Oral Daily Marielos Zimmerman MD      divalproex sodium  500 mg Oral HS Vanesa Collier MD      levothyroxine  25 mcg Oral Every Other Bro Moss MD      levothyroxine  50 mcg Oral Every Other Bro Moss MD      lithium carbonate  150 mg Oral QAM Selvin Bowman PA-C      loratadine  10 mg Oral Daily Marielos Zimmerman MD      LORazepam  0 5 mg Oral Q8H PRN Marielos Zimmerman MD      melatonin  10 mg Oral HS Marielos Zimmerman MD      metoprolol succinate  25 mg Oral BID Marielos Zimmerman MD      polyethylene glycol  17 g Oral Daily Marielos Zimmerman MD      QUEtiapine  200 mg Oral HS Lanita Merlin, MD      senna-docusate sodium  1 tablet Oral HS Marielos Zimmerman MD      temazepam  7 5 mg Oral HS Lanita Merlin, MD         Bipolar 1 disorder [facility])

## 2020-12-21 RX ADMIN — METOPROLOL SUCCINATE 25 MG: 25 TABLET, EXTENDED RELEASE ORAL at 21:12

## 2020-12-21 RX ADMIN — Medication 10 MG: at 21:11

## 2020-12-21 RX ADMIN — TEMAZEPAM 7.5 MG: 7.5 CAPSULE ORAL at 21:12

## 2020-12-21 RX ADMIN — Medication: at 17:01

## 2020-12-21 RX ADMIN — LITHIUM CARBONATE 150 MG: 150 CAPSULE, GELATIN COATED ORAL at 08:17

## 2020-12-21 RX ADMIN — LORATADINE 10 MG: 10 TABLET ORAL at 08:18

## 2020-12-21 RX ADMIN — SENNOSIDES AND DOCUSATE SODIUM 1 TABLET: 8.6; 5 TABLET ORAL at 21:12

## 2020-12-21 RX ADMIN — QUETIAPINE FUMARATE 200 MG: 100 TABLET ORAL at 21:11

## 2020-12-21 RX ADMIN — CHOLECALCIFEROL TAB 25 MCG (1000 UNIT) 1000 UNITS: 25 TAB at 08:17

## 2020-12-21 RX ADMIN — DIVALPROEX SODIUM 500 MG: 125 CAPSULE, COATED PELLETS ORAL at 21:11

## 2020-12-21 NOTE — PLAN OF CARE
Problem: Prexisting or High Potential for Compromised Skin Integrity  Goal: Skin integrity is maintained or improved  Description: INTERVENTIONS:  - Identify patients at risk for skin breakdown  - Assess and monitor skin integrity  - Assess and monitor nutrition and hydration status  - Monitor labs   - Assess for incontinence   - Turn and reposition patient  - Assist with mobility/ambulation  - Relieve pressure over bony prominences  - Avoid friction and shearing  - Provide appropriate hygiene as needed including keeping skin clean and dry  - Evaluate need for skin moisturizer/barrier cream  - Collaborate with interdisciplinary team   - Patient/family teaching  - Consider wound care consult   Outcome: Progressing     Problem: Alteration in Thoughts and Perception  Goal: Refrain from acting on delusional thinking/internal stimuli  Description: Interventions:  - Monitor patient closely, per order   - Utilize least restrictive measures   - Set reasonable limits, give positive feedback for acceptable   - Administer medications as ordered and monitor of potential side effects  Outcome: Progressing     Problem: Alteration in Thoughts and Perception  Goal: Treatment Goal: Gain control of psychotic behaviors/thinking, reduce/eliminate presenting symptoms and demonstrate improved reality functioning upon discharge  Outcome: Not Progressing  Goal: Verbalize thoughts and feelings  Description: Interventions:  - Promote a nonjudgmental and trusting relationship with the patient through active listening and therapeutic communication  - Assess patient's level of functioning, behavior and potential for risk  - Engage patient in 1 on 1 interactions  - Encourage patient to express fears, feelings, frustrations, and discuss symptoms    - [place] patient to reality, help patient recognize reality-based thinking   - Administer medications as ordered and assess for potential side effects  - Provide the patient education related to the signs and symptoms of the illness and desired effects of prescribed medications  Outcome: Not Progressing  Goal: Agree to be compliant with medication regime, as prescribed and report medication side effects  Description: Interventions:  - Offer appropriate PRN medication and supervise ingestion; conduct AIMS, as needed   Outcome: Not Progressing  Goal: Recognize dysfunctional thoughts, communicate reality-based thoughts at the time of discharge  Description: Interventions:  - Provide medication and psycho-education to assist patient in compliance and developing insight into his/her illness   Outcome: Not Progressing  Goal: Complete daily ADLs, including personal hygiene independently, as able  Description: Interventions:  - Observe, teach, and assist patient with ADLS  - Monitor and promote a balance of rest/activity, with adequate nutrition and elimination   Outcome: Not Progressing

## 2020-12-21 NOTE — PLAN OF CARE
Pt  Calm and engaged in two of three groups with intermittent alertness    Problem: Ineffective Coping  Goal: Participates in unit activities  Description: Interventions:  - Provide therapeutic environment   - Provide required programming   - Redirect inappropriate behaviors   Outcome: Progressing

## 2020-12-21 NOTE — CASE MANAGEMENT
VM received from pt's , Yifan Mccarthy, stating that he researched [facility] and found out how many residents have  there from [address] and he does not want pt to go there for STR  He prefers to take care of pt at home if she's psychiatrically stable   CM to follow up

## 2020-12-21 NOTE — NURSING NOTE
Patient is cooperative with care  Pt slightly irritable with  during several phone conversations today  Pt is medication compliant with medications taken whole in applesauce  No distress or S/S reported at this time

## 2020-12-21 NOTE — CASE MANAGEMENT
Spoke with pt's , Deepti Patel, regarding pt's d/c plan  He reiterated that he does not want pt to be d/c'ed to [facility]  He prefers that pt be d/c'ed home with extra in-home support   Deepti Patel will  pt tomorrow at 11am

## 2020-12-21 NOTE — PROGRESS NOTES
12/21/20 1000 12/21/20 1100 12/21/20 1330   Activity/Group Checklist   Group Community meeting  (costa srivastava) Wellness  (lite and lively) Life Skills  (memory,and word scramble task )   Attendance Attended Did not attend Attended   Attendance Duration (min) 16-30  --  16-30   Interactions Interacted appropriately  (fell asleep)  --  Disorganized interaction   Affect/Mood Appropriate  --  Calm  (Pt  jovial,in and out of alertness w/min insight )   Goals Achieved Able to listen to others  --  Able to engage in interactions

## 2020-12-21 NOTE — PROGRESS NOTES
Psychiatry Progress Note    Subjective: Interval History     Patient is sitting out on the unit this morning  She is pleasant when approached  She reports that she is feeling fine  Patient offers no concerns this morning  Case management received a voicemail from patient's  stating that he looked in to [facility] and does not want patient to go there he prefers to take her home if stable  Will discuss this today  Patient has been compliant with medication and meals  She has been cooperative with staff  She has been sleeping well  She has not been making any paranoid statements recently  She has been denying SI, HI, hallucinations      Behavior over the last 24 hours:  unchanged  Sleep: normal  Appetite: fair  Medication side effects: No  ROS: no complaints    Current medications:    Current Facility-Administered Medications:     acetaminophen (TYLENOL) tablet 650 mg, 650 mg, Oral, Q6H PRN, Renee Faulkner MD    ammonium lactate (LAC-HYDRIN) 12 % lotion, , Topical, BID, Carlos Morales DPM, Given at 12/20/20 1726    cholecalciferol (VITAMIN D3) tablet 1,000 Units, 1,000 Units, Oral, Daily, Renee Faulkner MD, 1,000 Units at 12/20/20 0809    divalproex sodium (DEPAKOTE SPRINKLE) capsule 500 mg, 500 mg, Oral, HS, Samantha Homans, MD, 500 mg at 12/20/20 2133    levothyroxine tablet 25 mcg, 25 mcg, Oral, Every Other Day, Renee Faulkner MD, 25 mcg at 12/20/20 4333    levothyroxine tablet 50 mcg, 50 mcg, Oral, Every Other Day, Renee Faulkner MD, 50 mcg at 12/20/20 4743    lithium carbonate capsule 150 mg, 150 mg, Oral, QAM, Misty Buck PA-C, 150 mg at 12/20/20 0809    loratadine (CLARITIN) tablet 10 mg, 10 mg, Oral, Daily, Renee Faulkner MD, 10 mg at 12/20/20 0809    LORazepam (ATIVAN) tablet 0 5 mg, 0 5 mg, Oral, Q8H PRN, Renee Faulkner MD, 0 5 mg at 12/11/20 1517    melatonin tablet 10 mg, 10 mg, Oral, HS, Renee Faulkner MD, 10 mg at 12/20/20 2134    metoprolol succinate (TOPROL-XL) 24 hr tablet 25 mg, 25 mg, Oral, BID, Ben Nielsen MD, 25 mg at 12/20/20 0809    polyethylene glycol (MIRALAX) packet 17 g, 17 g, Oral, Daily, Ben Nielsen MD, 17 g at 12/20/20 0809    QUEtiapine (SEROquel) tablet 200 mg, 200 mg, Oral, HS, Lucas Jeffers MD, 200 mg at 12/20/20 2133    senna-docusate sodium (SENOKOT S) 8 6-50 mg per tablet 1 tablet, 1 tablet, Oral, HS, Ben Nielsen MD, 1 tablet at 12/20/20 2134    temazepam (RESTORIL) capsule 7 5 mg, 7 5 mg, Oral, HS, Lucas Jeffers MD, 7 5 mg at 12/20/20 2134    Current Problem List:    Patient Active Problem List   Diagnosis    [facility] ([facility]    Essential hypertension    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[facility]    Hypothyroidism    Bipolar depression ([facility]    Chronic leg pain    Seasonal allergies    Bilateral dry eyes    Age-related osteoporosis without current pathological fracture    ANNA MARIE (acute kidney injury) ([facility]    Anxiety disorder    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[facility]    Dementia ([facility] )    Gait abnormality    History of Clostridium difficile infection    Menopause    Osteopenia    Thickened endometrium    Lithium toxicity    Abdominal distension    Medical clearance for psychiatric admission       Problem list reviewed 12/21/20     Objective:     Vital Signs:  Vitals:    12/20/20 2100 12/20/20 2201 12/21/20 0600 12/21/20 0716   BP: 108/55 104/60  114/62   BP Location: Left arm   Right arm   Pulse: 92   78   Resp:    16   Temp: 97 9 °F (36 6 °C)   98 4 °F (36 9 °C)   TempSrc: Temporal   Temporal   SpO2:    97%   Weight:   60 8 kg (134 lb 1 6 oz)    Height:             Appearance:  age appropriate and disheveled   Behavior:  normal   Speech:  soft   Mood:  normal   Affect:  constricted   Thought Process:  goal directed   Thought Content:  normal   Perceptual Disturbances: None   Risk Potential: none   Sensorium:  person and place   Cognition:  recent and remote memory: unable to assess due to lack of cooperation   Consciousness:  alert and awake Attention: attention span and concentration were decreased   Intellect: average   Insight:  limited   Judgment: limited      Motor Activity: no abnormal movements       I/O Past 24 hours:  I/O last 3 completed shifts: In: 720 [P O :720]  Out: -   No intake/output data recorded  Labs:  Reviewed 12/21/20    Progress Toward Goals:  Unchanged    Assessment / Plan:     Bipolar 1 disorder ([facility] )    Recommended Treatment:      Medication changes:  1) continue current medication regimen  Will discuss discharge with case management  Non-pharmacological treatments  1) Continue with group therapy, milieu therapy and occupational therapy  Safety  1) Safety/communication plan established targeting dynamic risk factors above  2) Risks, benefits, and possible side effects of medications explained to patient and patient verbalizes understanding  Counseling / Coordination of Care    Total floor / unit time spent today 20 minutes  Greater than 50% of total time was spent with the patient and / or family counseling and / or coordination of care  A description of the counseling / coordination of care  Patient's Rights, confidentiality and exceptions to confidentiality, use of automated medical record, [address] [place] [address] staff access to medical record, and consent to treatment reviewed      Sally Cotto PA-C

## 2020-12-22 VITALS
OXYGEN SATURATION: 95 % | SYSTOLIC BLOOD PRESSURE: 123 MMHG | WEIGHT: 134.04 LBS | RESPIRATION RATE: 18 BRPM | HEIGHT: 64 IN | HEART RATE: 82 BPM | BODY MASS INDEX: 22.88 KG/M2 | DIASTOLIC BLOOD PRESSURE: 63 MMHG | TEMPERATURE: 97.1 F

## 2020-12-22 RX ORDER — METOPROLOL SUCCINATE 25 MG/1
25 TABLET, EXTENDED RELEASE ORAL 2 TIMES DAILY
Qty: 60 TABLET | Refills: 0 | Status: SHIPPED | OUTPATIENT
Start: 2020-12-22

## 2020-12-22 RX ORDER — PHENOL 1.4 %
10 AEROSOL, SPRAY (ML) MUCOUS MEMBRANE
Qty: 30 TABLET | Refills: 0 | Status: SHIPPED | OUTPATIENT
Start: 2020-12-22

## 2020-12-22 RX ORDER — DIVALPROEX SODIUM 125 MG/1
500 CAPSULE, COATED PELLETS ORAL
Qty: 120 CAPSULE | Refills: 0 | Status: SHIPPED | OUTPATIENT
Start: 2020-12-22

## 2020-12-22 RX ORDER — POLYETHYLENE GLYCOL 3350 17 G/17G
17 POWDER, FOR SOLUTION ORAL DAILY
Qty: 30 EACH | Refills: 0 | Status: SHIPPED | OUTPATIENT
Start: 2020-12-22

## 2020-12-22 RX ORDER — QUETIAPINE FUMARATE 200 MG/1
200 TABLET, FILM COATED ORAL
Qty: 30 TABLET | Refills: 0 | Status: SHIPPED | OUTPATIENT
Start: 2020-12-22 | End: 2021-09-01

## 2020-12-22 RX ORDER — TEMAZEPAM 7.5 MG/1
7.5 CAPSULE ORAL
Qty: 30 CAPSULE | Refills: 0 | Status: SHIPPED | OUTPATIENT
Start: 2020-12-22 | End: 2021-09-01

## 2020-12-22 RX ORDER — FEXOFENADINE HCL 180 MG/1
180 TABLET ORAL DAILY
Qty: 30 TABLET | Refills: 0 | Status: SHIPPED | OUTPATIENT
Start: 2020-12-22

## 2020-12-22 RX ORDER — LEVOTHYROXINE SODIUM 0.05 MG/1
50 TABLET ORAL EVERY OTHER DAY
Qty: 15 TABLET | Refills: 0 | Status: SHIPPED | OUTPATIENT
Start: 2020-12-22

## 2020-12-22 RX ORDER — AMOXICILLIN 250 MG
1 CAPSULE ORAL
Qty: 30 TABLET | Refills: 0 | Status: SHIPPED | OUTPATIENT
Start: 2020-12-22

## 2020-12-22 RX ORDER — LEVOTHYROXINE SODIUM 0.03 MG/1
25 TABLET ORAL EVERY OTHER DAY
Qty: 15 TABLET | Refills: 0 | Status: SHIPPED | OUTPATIENT
Start: 2020-12-22

## 2020-12-22 RX ORDER — AMMONIUM LACTATE 12 G/100G
LOTION TOPICAL 2 TIMES DAILY
Qty: 400 G | Refills: 0 | Status: SHIPPED | OUTPATIENT
Start: 2020-12-22

## 2020-12-22 RX ORDER — LITHIUM CARBONATE 150 MG/1
150 CAPSULE ORAL EVERY MORNING
Qty: 30 CAPSULE | Refills: 0 | Status: SHIPPED | OUTPATIENT
Start: 2020-12-22 | End: 2021-09-01

## 2020-12-22 RX ADMIN — METOPROLOL SUCCINATE 25 MG: 25 TABLET, EXTENDED RELEASE ORAL at 08:38

## 2020-12-22 RX ADMIN — LEVOTHYROXINE SODIUM 50 MCG: 25 TABLET ORAL at 05:42

## 2020-12-22 RX ADMIN — LORATADINE 10 MG: 10 TABLET ORAL at 08:38

## 2020-12-22 RX ADMIN — POLYETHYLENE GLYCOL 3350 17 G: 17 POWDER, FOR SOLUTION ORAL at 08:38

## 2020-12-22 RX ADMIN — LITHIUM CARBONATE 150 MG: 150 CAPSULE, GELATIN COATED ORAL at 08:38

## 2020-12-22 RX ADMIN — CHOLECALCIFEROL TAB 25 MCG (1000 UNIT) 1000 UNITS: 25 TAB at 08:38

## 2020-12-22 RX ADMIN — LEVOTHYROXINE SODIUM 25 MCG: 25 TABLET ORAL at 05:42

## 2020-12-22 NOTE — NURSING NOTE
Patient agree to be discharge to home  No issues upon discharge   Patient's  understood about discharge information

## 2020-12-22 NOTE — DISCHARGE SUMMARY
Psychiatric Discharge Summary    Medical Record Number: [id number]  Encounter: [id number]    Discharge diagnosis:  Bipolar 1 disorder ([place] [address] )    Secondary diagnoses:  Problem List     * (Principal) Bipolar 1 disorder ([place] [address] )    Essential hypertension    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HCC)    Hypothyroidism    Bipolar depression ([place] [address] )    Chronic leg pain    Seasonal allergies    Bilateral dry eyes    Age-related osteoporosis without current pathological fracture    ANNA MARIE (acute kidney injury) ([place] [address] )    Anxiety disorder    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[place] [address] )    Overview Signed 12/1/2020  7:26 PM by Deangelo Simon PA-C     Last Assessment & Plan:   She has resting tremors and cogwheel rigidity  Her hand writing is not smooth  Likely she has early sign of Parkinson's disease  But it appears that she was on antipsychotics, which also can cause tremors (They don't remember the name of medications)  I would give a trial of Requip at low dose  She has leg edema at this time  I would wait until her leg edema improves  I will see her back in 6 months  Dementia [facility])    Overview Signed 12/1/2020  7:26 PM by Deangelo Simon PA-C     Last Assessment & Plan:   She was confused after she had a fall in April  But her mental status improved significantly over the last 3 months  Today, she scored 30/30 on mini-mental status exam  The brain MRI showed cortical trophy  Since the diagnosis of dementia is a clinical diagnosis and her mini-mental status exam dose not support the diagnosis of dementia            Gait abnormality    History of Clostridium difficile infection    Menopause    Osteopenia    Thickened endometrium    Lithium toxicity    Abdominal distension    Medical clearance for psychiatric admission          HPI:     Sophia Proctor is an 79 y o , female, admitted to the psychiatric unit under a 302 status to Dr Patience Sanders' service with the chief complaint of  increasing manic symptoms to the point where she was not able to care for self and she was acting bizarrely at home she has delusions of paranoia about her  whom she continues to berate and denigrate and accuses him of abusive behavior to her which is the unfounded the  When patient becomes manic and she becomes hateful and verbally aggressive towards spouse        Patient has history of bipolar disorder [facility] and is currently on Depakote  However patient was taken off lithium about the 3-4 months ago because of increasing get kidney impairment and since then she has deteriorated  In the past she has been on neuroleptics but has developed the Parkinson's symptoms on low doses of neuroleptics      Has no substance abuse or alcohol abuse and she is currently medically stable although on admission she was on the medical floor for several days because of dehydration and acute kidney injury as a result  That has resolved her kidney functions are now normal      Patient has no insight into her illness and continues to be rate in blame her  for being here and feels she has no problems psychiatrically at this time  Brief Hospital Course:     After the patient was admitted to the psychiatric unit  the patient had several psychotropic medication adjustments made to help stabilize their mood  Following these medication changes, the patient started to stabilize  Finally on 12/22/2020, the patient was found to be stable for discharge  On the day of discharge the patient reported their symptoms as significantly improved  All psychiatric medications were being tolerated without side effect or adverse event  No suicidal or homicidal ideations were present  The patient expressed their readiness and willingness to be discharged and referral to outpatient treatment was made  The case was discussed with Dr Hi Vera and he has deemed the patient stable for discharge        Condition on discharge:     Improved    Medications Upon Discharge:        ammonium lactate (LAC-HYDRIN) 12 % lotion, , Topical, BID, Annia Thacker, DPM, Given at 12/21/20 1701    cholecalciferol (VITAMIN D3) tablet 1,000 Units, 1,000 Units, Oral, Daily, Jenniffer Goyal MD, 1,000 Units at 12/21/20 0817    divalproex sodium (DEPAKOTE SPRINKLE) capsule 500 mg, 500 mg, Oral, HS, Eran Choudhary MD, 500 mg at 12/21/20 2111    levothyroxine tablet 25 mcg, 25 mcg, Oral, Every Other Day, Jenniffer Goyal MD, 25 mcg at 12/22/20 0542    levothyroxine tablet 50 mcg, 50 mcg, Oral, Every Other Day, Jenniffer Goyal MD, 50 mcg at 12/22/20 0542    lithium carbonate capsule 150 mg, 150 mg, Oral, QA, Misty Buck PA-C, 150 mg at 12/21/20 0817    loratadine (CLARITIN) tablet 10 mg, 10 mg, Oral, Daily, Jenniffer Goyal MD, 10 mg at 12/21/20 0818    LORazepam (ATIVAN) tablet 0 5 mg, 0 5 mg, Oral, Q8H PRN, Jenniffer Goyal MD, 0 5 mg at 12/11/20 1517    melatonin tablet 10 mg, 10 mg, Oral, HS, Jenniffer Goyal MD, 10 mg at 12/21/20 2111    metoprolol succinate (TOPROL-XL) 24 hr tablet 25 mg, 25 mg, Oral, BID, Jenniffer Goyal MD, 25 mg at 12/21/20 2112    polyethylene glycol (MIRALAX) packet 17 g, 17 g, Oral, Daily, Jenniffer Goyal MD, 17 g at 12/20/20 0809    QUEtiapine (SEROquel) tablet 200 mg, 200 mg, Oral, HS, Alberto Verde MD, 200 mg at 12/21/20 2111    senna-docusate sodium (SENOKOT S) 8 6-50 mg per tablet 1 tablet, 1 tablet, Oral, HS, Jenniffer Goyal MD, 1 tablet at 12/21/20 2112    temazepam (RESTORIL) capsule 7 5 mg, 7 5 mg, Oral, HS, Alberto Verde MD, 7 5 mg at 12/21/20 2112    VERNON WoodC

## 2020-12-22 NOTE — PHYSICAL THERAPY NOTE
Physical TherapyTreatment Note    Patient's Name: Madison Whiteside    Admitting Diagnosis  Major depressive disorder, single episode, unspecified [F32 9]    Problem List  Patient Active Problem List   Diagnosis    Bipolar 1 disorder ([place] [address] )    Essential hypertension    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[place] [address] )    Hypothyroidism    Bipolar depression ([place] [address] )    Chronic leg pain    Seasonal allergies    Bilateral dry eyes    Age-related osteoporosis without current pathological fracture    ANNA MARIE (acute kidney injury) ([place] [address] )    Anxiety disorder    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[place] [address] )    Dementia ([place] [address] )    Gait abnormality    History of Clostridium difficile infection    Menopause    Osteopenia    Thickened endometrium    Lithium toxicity    Abdominal distension    Medical clearance for psychiatric admission       Past Medical History  Past Medical History:   Diagnosis Date    Bipolar depression ([place] [address] )     COPD (chronic obstructive pulmonary disease) ([place] [address] )     Essential hypertension     Hypothyroidism     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HCC)        Past Surgical History  Past Surgical History:   Procedure Laterality Date    TONSILLECTOMY      TOTAL HIP ARTHROPLASTY Right        Recent Imaging  No orders to display       Recent Vital Signs  Vitals:    12/21/20 2111 12/21/20 2115 12/22/20 0600 12/22/20 0647   BP: 149/65 149/65  123/63   BP Location:  Right arm  Right arm   Pulse: 95 102  82   Resp:  16  18   Temp:  (!) 101 °F (38 3 °C)  (!) 97 1 °F (36 2 °C)   TempSrc:  Temporal  Temporal   SpO2:  95%  95%   Weight:   60 8 kg (134 lb 0 6 oz)    Height:            12/22/20 0700   Assessment   Prognosis Good   Problem List Decreased strength;Decreased range of motion;Decreased endurance; Impaired balance;Decreased mobility; Decreased coordination;Decreased cognition; Impaired judgement;Decreased safety awareness   Barriers to Discharge Inaccessible home environment   Barriers to Discharge Comments reporting  will provide assistance with ADLs   Goals   Patient Goals to go home with    STG Expiration Date 12/23/20   Recommendation   PT Discharge Recommendation Home with skilled therapy  (home therapy services)   Equipment Recommended Angie Mendoza   PT - OK to Discharge Yes   Additional Comments home with home PT and assist of    Amanda Albrecht [address]   Turning in Bed Without Bedrails 3   Lying on Back to Sitting on Edge of Flat Bed 3   Moving Bed to Chair 3   Standing Up From Chair 3   Walk in Room 3   Climb 3-5 Stairs 3   Basic Mobility Inpatient Raw Score 18   Basic Mobility Standardized Score 41 05       Deshaun Mccullough PT, DPT

## 2020-12-22 NOTE — PROGRESS NOTES
12/22/20 0845   Team Meeting   Meeting Type Daily Rounds   Initial Conference Date 12/22/20   Team Members Present   Team Members Present Physician;Nurse;;; Occupational Therapist   Physician Team Member Nany Chen   Nursing Team Member [address] Management Team Member Carmel Awad   Social Work Team Member Marlon   OT Team Member Jw Mcadams   Patient/Family Present   Patient Present No   Patient's Family Present No     Irritable edge, eating and sleeping well, med compliant, cooperative, discharge home today at 11am

## 2020-12-22 NOTE — PLAN OF CARE
Problem: Prexisting or High Potential for Compromised Skin Integrity  Goal: Skin integrity is maintained or improved  Description: INTERVENTIONS:  - Identify patients at risk for skin breakdown  - Assess and monitor skin integrity  - Assess and monitor nutrition and hydration status  - Monitor labs   - Assess for incontinence   - Turn and reposition patient  - Assist with mobility/ambulation  - Relieve pressure over bony prominences  - Avoid friction and shearing  - Provide appropriate hygiene as needed including keeping skin clean and dry  - Evaluate need for skin moisturizer/barrier cream  - Collaborate with interdisciplinary team   - Patient/family teaching  - Consider wound care consult   Outcome: Adequate for Discharge     Problem: Alteration in Thoughts and Perception  Goal: Treatment Goal: Gain control of psychotic behaviors/thinking, reduce/eliminate presenting symptoms and demonstrate improved reality functioning upon discharge  Outcome: Adequate for Discharge  Goal: Verbalize thoughts and feelings  Description: Interventions:  - Promote a nonjudgmental and trusting relationship with the patient through active listening and therapeutic communication  - Assess patient's level of functioning, behavior and potential for risk  - Engage patient in 1 on 1 interactions  - Encourage patient to express fears, feelings, frustrations, and discuss symptoms    - [place] patient to reality, help patient recognize reality-based thinking   - Administer medications as ordered and assess for potential side effects  - Provide the patient education related to the signs and symptoms of the illness and desired effects of prescribed medications  Outcome: Adequate for Discharge  Goal: Refrain from acting on delusional thinking/internal stimuli  Description: Interventions:  - Monitor patient closely, per order   - Utilize least restrictive measures   - Set reasonable limits, give positive feedback for acceptable   - Administer medications as ordered and monitor of potential side effects  Outcome: Adequate for Discharge  Goal: Agree to be compliant with medication regime, as prescribed and report medication side effects  Description: Interventions:  - Offer appropriate PRN medication and supervise ingestion; conduct AIMS, as needed   Outcome: Adequate for Discharge  Goal: Attend and participate in unit activities, including therapeutic, recreational, and educational groups  Description: Interventions:  -Encourage Visitation and family involvement in care  Outcome: Adequate for Discharge  Goal: Recognize dysfunctional thoughts, communicate reality-based thoughts at the time of discharge  Description: Interventions:  - Provide medication and psycho-education to assist patient in compliance and developing insight into his/her illness   Outcome: Adequate for Discharge  Goal: Complete daily ADLs, including personal hygiene independently, as able  Description: Interventions:  - Observe, teach, and assist patient with ADLS  - Monitor and promote a balance of rest/activity, with adequate nutrition and elimination   Outcome: Adequate for Discharge     Problem: Ineffective Coping  Goal: Identifies ineffective coping skills  Outcome: Adequate for Discharge  Goal: Identifies healthy coping skills  Outcome: Adequate for Discharge  Goal: Demonstrates healthy coping skills  Outcome: Adequate for Discharge  Goal: Patient/Family participate in treatment and DC plans  Description: Interventions:  - Provide therapeutic environment  Outcome: Adequate for Discharge  Goal: Patient/Family verbalizes awareness of resources  Outcome: Adequate for Discharge  Goal: Understands least restrictive measures  Description: Interventions:  - Utilize least restrictive behavior  Outcome: Adequate for Discharge  Goal: Free from restraint events  Description: - Utilize least restrictive measures   - Provide behavioral interventions   - Redirect inappropriate behaviors Outcome: Adequate for Discharge     Problem: Alteration in Orientation  Goal: Treatment Goal: Demonstrate a reduction of confusion and improved orientation to person, place, time and/or situation upon discharge, according to optimum baseline/ability  Outcome: Adequate for Discharge  Goal: Interact with staff daily  Description: Interventions:  - Assess and re-assess patient's level of orientation  - Engage patient in 1 on 1 interactions, daily, for a minimum of 15 minutes   - Establish rapport/trust with patient   Outcome: Adequate for Discharge  Goal: Express concerns related to confused thinking related to:  Description: Interventions:  - Encourage patient to express feelings, fears, frustrations, hopes  - Assign consistent caregivers   - [place]/re-orient patient as needed  - Allow comfort items, as appropriate  - Provide visual cues, signs, etc    Outcome: Adequate for Discharge  Goal: Allow medical examinations, as recommended  Description: Interventions:  - Provide physical/neurological exams and/or referrals, per provider   Outcome: Adequate for Discharge  Goal: Cooperate with recommended testing/procedures  Description: Interventions:  - Determine need for ancillary testing  - Observe for mental status changes  - Implement falls/precaution protocol   Outcome: Adequate for Discharge  Goal: Complete daily ADLs, including personal hygiene independently, as able  Description: Interventions:  - Observe, teach, and assist patient with ADLS  Outcome: Adequate for Discharge     Problem: JULIO C  Goal: Will exhibit normal sleep and speech and no impulsivity  Description: INTERVENTIONS:  - Administer medication as ordered  - Set limits on impulsive behavior  - Make attempts to decrease external stimuli as possible  Outcome: Adequate for Discharge     Problem: Ineffective Coping  Goal: Participates in unit activities  Description: Interventions:  - Provide therapeutic environment   - Provide required programming   - Redirect inappropriate behaviors   Outcome: Adequate for Discharge     Problem: Nutrition/Hydration-ADULT  Goal: Nutrient/Hydration intake appropriate for improving, restoring or maintaining nutritional needs  Description: Monitor and assess patient's nutrition/hydration status for malnutrition  Collaborate with interdisciplinary team and initiate plan and interventions as ordered  Monitor patient's weight and dietary intake as ordered or per policy  Utilize nutrition screening tool and intervene as necessary  Determine patient's food preferences and provide high-protein, high-caloric foods as appropriate       INTERVENTIONS:  - Monitor oral intake, urinary output, labs, and treatment plans  - Assess nutrition and hydration status and recommend course of action  - Evaluate amount of meals eaten  - Assist patient with eating if necessary   - Allow adequate time for meals  - Recommend/ encourage appropriate diets, oral nutritional supplements, and vitamin/mineral supplements  - Order, calculate, and assess calorie counts as needed  - Recommend, monitor, and adjust tube feedings and TPN/PPN based on assessed needs  - Assess need for intravenous fluids  - Provide specific nutrition/hydration education as appropriate  - Include patient/family/caregiver in decisions related to nutrition  Outcome: Adequate for Discharge

## 2021-09-02 ENCOUNTER — TELEPHONE (OUTPATIENT)
Dept: PSYCHIATRY | Facility: CLINIC | Age: 81
End: 2021-09-02

## 2021-09-13 DIAGNOSIS — F41.9 ANXIETY: ICD-10-CM

## 2021-09-13 RX ORDER — LORAZEPAM 0.5 MG/1
0.25 TABLET ORAL DAILY
Qty: 15 TABLET | Refills: 3 | Status: SHIPPED | OUTPATIENT
Start: 2021-09-13 | End: 2022-01-10 | Stop reason: SDUPTHER

## 2022-01-10 DIAGNOSIS — F41.9 ANXIETY: ICD-10-CM

## 2022-01-10 RX ORDER — LORAZEPAM 0.5 MG/1
0.25 TABLET ORAL DAILY
Qty: 15 TABLET | Refills: 3 | Status: SHIPPED | OUTPATIENT
Start: 2022-01-10 | End: 2022-02-19 | Stop reason: SDUPTHER

## 2022-01-20 ENCOUNTER — TELEPHONE (OUTPATIENT)
Dept: PSYCHIATRY | Facility: CLINIC | Age: 82
End: 2022-01-20

## 2022-01-20 NOTE — TELEPHONE ENCOUNTER
Pt  called and would like to schedule his wife   She will be dc'ed this week he said in his VM   [phone] (husbands #)

## 2022-02-19 DIAGNOSIS — F31.9 BIPOLAR 1 DISORDER (HCC): Primary | ICD-10-CM

## 2022-02-19 DIAGNOSIS — F41.9 ANXIETY: ICD-10-CM

## 2022-02-19 RX ORDER — QUETIAPINE FUMARATE 100 MG/1
100 TABLET, FILM COATED ORAL
Qty: 30 TABLET | Refills: 2 | Status: SHIPPED | OUTPATIENT
Start: 2022-02-19

## 2022-02-19 RX ORDER — LORAZEPAM 0.5 MG/1
0.25 TABLET ORAL DAILY
Qty: 15 TABLET | Refills: 2 | Status: SHIPPED | OUTPATIENT
Start: 2022-02-19

## 2022-02-28 ENCOUNTER — TELEPHONE (OUTPATIENT)
Dept: PSYCHIATRY | Facility: CLINIC | Age: 82
End: 2022-02-28

## 2022-03-01 NOTE — TELEPHONE ENCOUNTER
I spoke with both the patient and her  Edwige Messer  Pt is currently on waitlist for therapy and med management  Would like to schedule with Vj Forrester

## 2022-03-09 ENCOUNTER — TELEPHONE (OUTPATIENT)
Dept: PSYCHIATRY | Facility: CLINIC | Age: 82
End: 2022-03-09

## 2022-03-09 NOTE — TELEPHONE ENCOUNTER
Behavorial Health Outpatient Intake Questions    Referred by:PCP  Please advised interviewee that they need to answer all questions truthfully to allow for best care and any misrepresentations of information may affect their ability to be seen at this clinic   => Was this discussed? Yes     [facility]     Presenting Problem (in patient's words):   Med mgnt  Bipolar, depression,anxiety     Are there any developmental disabilities? ? If yes, can they speak to you on the phone? If they are too limited to speak to you on phone, refer out Yes  Depression anxiety bipolar      Are you taking any psychiatric medications? Yes    => If yes, who prescribes? If yes, are they injectable medications? Ativan, seroquel, depakote = Dyane Frieze    Does the patient have a language barrier or hearing impairment? No    Have you been treated at [facility] by a therapist or a doctor in the past? If yes, who? No    Has the patient been hospitalized for mental health? No   If yes, how long ago was last hospitalization and where was it? Do you actively use alcohol or marijuana or illegal substances? If yes, what and how much - refer out to Drug and alcohol treatment if use is excessive or daily use of illegal substances No concerns of substance abuse are reported  Do you have a community treatment team or ? No    Legal History-     Does the patient have any history of arrests, detention/long term time, or DUIs? No  If Yes-  1) What types of charges? 2) When were they last incarcerated? 3) Are they currently on parole or probation? Minor Child-    Who has custody of the child? Is there a custody agreement? If there is a custody agreement remind parent that they must bring a copy to the first appt or they will not be seen       Intake Team, please check with provider before scheduling if flags come up such as:  - complex case  - legal history (other than DUI)  - communication barrier concerns are present  - if, in your judgment, this needs further review    ACCEPTED as a patient Yes  => Appointment Date: 04/12/2022 @ 2 w/ Flossie Cheadle    Referred Elsewhere? No    Name of [address] [place] [address] Phone #  If ins is primary or secondary    [facility] [place] [facility]   ID #[id number]  If patient is a minor, parents information such as Name, D  O B of guarantor

## 2022-04-21 ENCOUNTER — TELEPHONE (OUTPATIENT)
Dept: PSYCHIATRY | Facility: CLINIC | Age: 82
End: 2022-04-21

## 2022-04-25 ENCOUNTER — TELEPHONE (OUTPATIENT)
Dept: PSYCHIATRY | Facility: CLINIC | Age: 82
End: 2022-04-25

## 2022-09-16 ENCOUNTER — TELEPHONE (OUTPATIENT)
Dept: PSYCHIATRY | Facility: CLINIC | Age: 82
End: 2022-09-16

## 2022-09-16 NOTE — TELEPHONE ENCOUNTER
contacted patient off [facility] therapy waitist to verify needs of services in attempts to update waitlist  lvm for patient  to contact intake dept

## 2022-09-21 NOTE — TELEPHONE ENCOUNTER
Pt  called and stated pt is in a nursing home and wouldn't need services so you can take her off the wait list

## 2023-05-31 ENCOUNTER — NURSING HOME VISIT (OUTPATIENT)
Dept: GERIATRICS | Facility: OTHER | Age: 83
End: 2023-05-31

## 2023-05-31 DIAGNOSIS — I10 ESSENTIAL HYPERTENSION: ICD-10-CM

## 2023-05-31 DIAGNOSIS — E03.9 ACQUIRED HYPOTHYROIDISM: ICD-10-CM

## 2023-05-31 DIAGNOSIS — N17.9 AKI (ACUTE KIDNEY INJURY) (HCC): ICD-10-CM

## 2023-05-31 DIAGNOSIS — F31.9 BIPOLAR DEPRESSION (HCC): ICD-10-CM

## 2023-05-31 DIAGNOSIS — F41.9 ANXIETY DISORDER, UNSPECIFIED TYPE: ICD-10-CM

## 2023-05-31 DIAGNOSIS — F03.A4 MILD DEMENTIA WITH ANXIETY, UNSPECIFIED DEMENTIA TYPE (HCC): ICD-10-CM

## 2023-05-31 DIAGNOSIS — R26.2 AMBULATORY DYSFUNCTION: ICD-10-CM

## 2023-05-31 DIAGNOSIS — K52.9 ENTEROCOLITIS: Primary | ICD-10-CM

## 2023-05-31 PROBLEM — R19.7 DIARRHEA, UNSPECIFIED: Status: ACTIVE | Noted: 2022-04-07

## 2023-05-31 PROBLEM — E87.1 HYPONATREMIA: Status: ACTIVE | Noted: 2023-04-25

## 2023-05-31 PROBLEM — M62.81 GENERALIZED MUSCLE WEAKNESS: Status: ACTIVE | Noted: 2022-04-07

## 2023-05-31 PROBLEM — R13.10 DYSPHAGIA, UNSPECIFIED: Status: ACTIVE | Noted: 2022-04-07

## 2023-05-31 PROBLEM — R41.841 COGNITIVE COMMUNICATION DEFICIT: Status: ACTIVE | Noted: 2022-04-07

## 2023-05-31 PROBLEM — E87.6 HYPOKALEMIA: Status: ACTIVE | Noted: 2018-01-06

## 2023-05-31 RX ORDER — B-COMPLEX WITH VITAMIN C
1 TABLET ORAL 2 TIMES DAILY WITH MEALS
COMMUNITY
Start: 2023-04-28 | End: 2024-04-27

## 2023-05-31 RX ORDER — DIVALPROEX SODIUM 250 MG/1
1 TABLET, EXTENDED RELEASE ORAL DAILY
COMMUNITY
Start: 2023-04-10

## 2023-05-31 RX ORDER — METOPROLOL SUCCINATE 50 MG/1
1 TABLET, EXTENDED RELEASE ORAL DAILY
COMMUNITY
Start: 2023-03-28

## 2023-05-31 RX ORDER — LOPERAMIDE HYDROCHLORIDE 2 MG/1
2 TABLET ORAL 4 TIMES DAILY PRN
COMMUNITY

## 2023-05-31 RX ORDER — DIVALPROEX SODIUM 500 MG/1
500 TABLET, EXTENDED RELEASE ORAL
COMMUNITY
Start: 2023-03-28

## 2023-05-31 RX ORDER — LANOLIN ALCOHOL/MO/W.PET/CERES
3 CREAM (GRAM) TOPICAL
COMMUNITY

## 2023-05-31 NOTE — PROGRESS NOTES
[facility] notes  SHORT TERM REHAB       NAME: Glen Gandhi  AGE: 79 y o  SEX: female    DATE OF ENCOUNTER: 5/31/2023    Assessment and Plan   Enterocolitis  Symptoms improved  Continue to encourage fluids  Continue monitoring oral intake  Will repeat labs     Ambulatory dysfunction  Multifactorial including now enterocolitis   Continue with safety measures and fall precautions  PT/OT eval and treat     ANNA MARIE (acute kidney injury) ([facility] )  Cr was 2 44 on admission and improved with hydration to normal 0 72  Continue current meds  Continue encouraging fluids  Will repeat to monitor     Anxiety disorder  Continue current meds  Continue monitoring symptoms     Bipolar depression ([facility] )  Continue current meds  Continue monitoring symptoms   Continue with supportive care     Dementia [facility])   reports she has some cognitive deficits   Continue with current meds  Continue monitoring symptoms  Monitor for delirium     Essential hypertension  Continue current meds  Continue monitoring BP    Hypothyroidism  Continue current dose of synthroid   Last TSH 5 22 23 normal           Chief Complaint     No new complaints    History of Present Illness     78 yo female seen for admission to [address] [place] [address] after hospitalization  As per patient she was having vomiting and diarrhea as a result was sent to the hospital but not able to give much more details  Most history obtained from  over the phone  As per him about 1 yr ago she ended up in [address] [place], [place] [address] for about a year where she made some progress at which point he took her home  She was good for couple weeks then she started have diarrhea and vomiting  She was taken to the hospital then from there she was sent [facility] home then took her home after therapy  She was good for a day then she went back to having diarrhea and throwing up again   She was taken back to the hospital  She was managed in the hospital then she was then sent [facility] to [facility]  Patient in the hospital was managed for enteritis, dehydration and ANNA MARIE  Once she was stable she discharged to [address] [place] [address] for therapy       PMHx     Past Medical History:   Diagnosis Date   • Bipolar depression ([facility] )    • COPD (chronic obstructive pulmonary disease) ([facility] )    • Dementia ([facility] )    • Essential hypertension    • Hypothyroidism    • 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HCC)      Past Surgical History:   Procedure Laterality Date   • TONSILLECTOMY     • TOTAL HIP ARTHROPLASTY Right      Family History   Problem Relation Age of Onset   • Diabetes Mother    • Cancer Brother      Social History     Socioeconomic History   • Marital status: /Civil Union     Spouse name: None   • Number of children: None   • Years of education: None   • Highest education level: None   Occupational History   • None   Tobacco Use   • Smoking status: Never   • Smokeless tobacco: Never   Vaping Use   • Vaping Use: Never used   Substance and Sexual Activity   • Alcohol use: Never   • Drug use: No   • Sexual activity: Never   Other Topics Concern   • None   Social History Narrative   • None     Social Determinants of Health     Financial Resource Strain: Not on file   Food Insecurity: Not on file   Transportation Needs: Not on file   Physical Activity: Not on file   Stress: Not on file   Social Connections: Not on file   Intimate Partner Violence: Not on file   Housing Stability: Not on file     Allergies   Allergen Reactions   • Ampicillin Other (See Comments)     per t-system  per t-system  Tolerates cefazolin    • Penicillins        Review of Systems     Denies any pain or shortness of breath   All other review of system negative        Objective   Vital signs:  BP: 111/65  HR: 73  RR: 16  TEMP: 98 2F  SAT : 99% on RA    PHYSICAL EXAM:  GENERAL: no acute distress, chronically ill appearing   SKIN: warm, dry, no rash, no cyanosis  HEENT: normocephalic, atraumatic, no JVD, no Thyromegaly, no lymphadenopathy  LUNGS: CTA, no wheezing, no rales, expanded equally, no chest tenderness   HEART: normal rhythm, normal rate, no murmur, no gallop  ABDOMEN: soft non tender non distended bs+, no guarding or rebound tenderness  :  no suprapubic tenderness  MUSCULOSKELETAL: strength about 4+/5 all extremities, ROM within normal, no calf tenderness  NEUROLOGY: awake, alert, Oriented to person, [place] and [place] only, unable to recall 3 object  CN2-12 intact  Stuttered slow speech  PSYCH: cooperative, pleasant  Pertinent Laboratory/Diagnostic Studies:  Recent labs and diagnostic tests reviewed in nursing home EMR    Current Medications   Medications reviewed and signed off on nursing home EMR

## 2023-05-31 NOTE — ASSESSMENT & PLAN NOTE
reports she has some cognitive deficits   Continue with current meds  Continue monitoring symptoms  Monitor for delirium

## 2023-05-31 NOTE — ASSESSMENT & PLAN NOTE
Cr was 2 44 on admission and improved with hydration to normal 0 72  Continue current meds  Continue encouraging fluids  Will repeat to monitor

## 2023-05-31 NOTE — ASSESSMENT & PLAN NOTE
Multifactorial including now enterocolitis   Continue with safety measures and fall precautions  PT/OT eval and treat

## 2023-06-14 ENCOUNTER — NURSING HOME VISIT (OUTPATIENT)
Dept: GERIATRICS | Facility: OTHER | Age: 83
End: 2023-06-14
Payer: MEDICARE

## 2023-06-14 DIAGNOSIS — K52.9 ENTEROCOLITIS: Primary | ICD-10-CM

## 2023-06-14 DIAGNOSIS — D64.9 ANEMIA, UNSPECIFIED TYPE: ICD-10-CM

## 2023-06-14 DIAGNOSIS — R26.2 AMBULATORY DYSFUNCTION: ICD-10-CM

## 2023-06-14 DIAGNOSIS — N17.9 AKI (ACUTE KIDNEY INJURY) (HCC): ICD-10-CM

## 2023-06-14 PROCEDURE — 99309 SBSQ NF CARE MODERATE MDM 30: CPT | Performed by: INTERNAL MEDICINE

## 2023-06-14 NOTE — ASSESSMENT & PLAN NOTE
Hb 9 8 on 6/2/23 repeat here and stable from hospital hb which was 9 7  Continue monitoring for signs of bleeding  Continue monitoring CBC as out patient

## 2023-06-14 NOTE — PROGRESS NOTES
[facility] [address] notes  SHORT TERM REHAB       NAME: Po Anthony  AGE: 79 y o  SEX: female    DATE OF ENCOUNTER: 6/14/2023    Assessment and Plan   Enterocolitis  Symptoms seems to be improved  Continue monitoring symptoms  Continue encouraging fluids     Ambulatory dysfunction  Multifactorial  Continues to work with therapy  Reviewed therapy notes  Discussed with therapy about patient  Continue with fall precautions     ANNA MARIE (acute kidney injury) ([facility] )  Repeat Cr is 0 77 here   Continue encouraging fluids     Anemia  Hb 9 8 on 6/2/23 repeat here and stable from hospital hb which was 9 7  Continue monitoring for signs of bleeding  Continue monitoring CBC as out patient       Chief Complaint     No new complaints     History of Present Illness     80 yo female seen for follow up  Patient seen for entercolitis, ambulatory dysfunction, ANNA MARIE and anemia  Reviewed nursing notes since last visit       PMHx     Past Medical History:   Diagnosis Date   • Bipolar depression ([facility] )    • COPD (chronic obstructive pulmonary disease) ([facility] )    • Dementia ([facility] )    • Essential hypertension    • Hypothyroidism    • 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HCC)      Past Surgical History:   Procedure Laterality Date   • TONSILLECTOMY     • TOTAL HIP ARTHROPLASTY Right      Family History   Problem Relation Age of Onset   • Diabetes Mother    • Cancer Brother      Social History     Socioeconomic History   • Marital status: /Civil Union     Spouse name: Not on file   • Number of children: Not on file   • Years of education: Not on file   • Highest education level: Not on file   Occupational History   • Not on file   Tobacco Use   • Smoking status: Never   • Smokeless tobacco: Never   Vaping Use   • Vaping Use: Never used   Substance and Sexual Activity   • Alcohol use: Never   • Drug use: No   • Sexual activity: Never   Other Topics Concern   • Not on file   Social History Narrative   • Not on file     Social Determinants of Health Financial Resource Strain: Not on file   Food Insecurity: Not on file   Transportation Needs: Not on file   Physical Activity: Not on file   Stress: Not on file   Social Connections: Not on file   Intimate Partner Violence: Not on file   Housing Stability: Not on file     Allergies   Allergen Reactions   • Ampicillin Other (See Comments)     per t-system  per t-system  Tolerates cefazolin    • Penicillins        Review of Systems     Denies any pain or shortness of breath   All other review of system negative        Objective   Vital signs reviewed from facility records  PHYSICAL EXAM:  GENERAL: no acute distress, chronically ill appearing   SKIN: warm, dry, no rash, no cyanosis  HEENT: normocephalic, atraumatic, no JVD, no Thyromegaly, no lymphadenopathy  LUNGS: CTA, no wheezing, no rales, expanded equally, no chest tenderness   HEART: normal rhythm, normal rate, no murmur, no gallop  ABDOMEN: soft non tender non distended bs+, no guarding or rebound tenderness  :  no suprapubic tenderness  MUSCULOSKELETAL: strength about 4+/5 all extremities, no calf tenderness  NEUROLOGY: awake, alert, CN2-12 intact  PSYCH: cooperative, pleasant         Pertinent Laboratory/Diagnostic Studies:  Recent labs and diagnostic tests reviewed in nursing home EMR    Current Medications   Medications reviewed

## 2023-06-14 NOTE — ASSESSMENT & PLAN NOTE
Multifactorial  Continues to work with therapy  Reviewed therapy notes  Discussed with therapy about patient  Continue with fall precautions

## 2023-06-21 ENCOUNTER — NURSING HOME VISIT (OUTPATIENT)
Dept: GERIATRICS | Facility: OTHER | Age: 83
End: 2023-06-21
Payer: MEDICARE

## 2023-06-21 DIAGNOSIS — I10 ESSENTIAL HYPERTENSION: ICD-10-CM

## 2023-06-21 DIAGNOSIS — N17.9 AKI (ACUTE KIDNEY INJURY) (HCC): ICD-10-CM

## 2023-06-21 DIAGNOSIS — K52.9 ENTEROCOLITIS: Primary | ICD-10-CM

## 2023-06-21 DIAGNOSIS — F31.9 BIPOLAR DEPRESSION (HCC): ICD-10-CM

## 2023-06-21 DIAGNOSIS — E03.9 ACQUIRED HYPOTHYROIDISM: ICD-10-CM

## 2023-06-21 DIAGNOSIS — F41.9 ANXIETY DISORDER, UNSPECIFIED TYPE: ICD-10-CM

## 2023-06-21 DIAGNOSIS — R26.2 AMBULATORY DYSFUNCTION: ICD-10-CM

## 2023-06-21 DIAGNOSIS — F03.A4 MILD DEMENTIA WITH ANXIETY, UNSPECIFIED DEMENTIA TYPE (HCC): ICD-10-CM

## 2023-06-21 PROCEDURE — 99316 NF DSCHRG MGMT 30 MIN+: CPT | Performed by: INTERNAL MEDICINE

## 2023-06-21 NOTE — ASSESSMENT & PLAN NOTE
Cr now at 0 77 on admission to hospital was 2 44  Continue encouraging fluids  Continue monitoring as out patient with PCP

## 2023-06-21 NOTE — ASSESSMENT & PLAN NOTE
Continue with supportive care  Continue with safety measures  Continue with redirection and monitoring for delirium

## 2023-06-21 NOTE — ASSESSMENT & PLAN NOTE
Has not taken prn ativan at the facility  Continue with supportive care  Continue with depakote for mood   Continue follow up with PCP about restarting ativan

## 2023-06-21 NOTE — ASSESSMENT & PLAN NOTE
Seems to be stable  Continue current med  Continue monitoring symptoms  Continue with supportive care

## 2023-06-21 NOTE — ASSESSMENT & PLAN NOTE
Multifactorial  Continue with safety measures at home  Reviewed therapy notes  Discussed with therapy about patient   Continue with fall precautions

## 2023-06-21 NOTE — PROGRESS NOTES
[facility] home notes  [address] Discharge summary      NAME: Maxi Chirinos  AGE: 79 y o  SEX: female    DATE OF ENCOUNTER: 6/21/2023    Assessment and Plan   Enterocolitis  Symptoms resolved  Continue encouraging fluids at home  Continue monitoring symptoms     Ambulatory dysfunction  Multifactorial  Continue with safety measures at home  Reviewed therapy notes  Discussed with therapy about patient   Continue with fall precautions     ANNA MARIE (acute kidney injury) ([facility] [address] )  Cr now at 0 77 on admission to hospital was 2 44  Continue encouraging fluids  Continue monitoring as out patient with PCP    Anxiety disorder  Has not taken prn ativan at the facility  Continue with supportive care  Continue with depakote for mood   Continue follow up with PCP about restarting ativan    Bipolar depression ([facility] [address] )  Seems to be stable  Continue current med  Continue monitoring symptoms  Continue with supportive care     Dementia ([facility] [address] )  Continue with supportive care  Continue with safety measures  Continue with redirection and monitoring for delirium     Essential hypertension  BP reviewed from facility records acceptable range  Continue current meds  Continue monitoring BP    Hypothyroidism  Continue synthroid  TSH normal last month        Chief Complaint     No new complaints    History of Present Illness     78 yo female seen for discharge  Patient was admitted to [facility] after hospitalization for gastroenteritis  Patient was admitted for therapy  She continued with therapy and now made enough progress to be discharge home with services  Reviewed nursing notes since last visit       PMHx     Past Medical History:   Diagnosis Date   • Bipolar depression ([facility] [address] )    • COPD (chronic obstructive pulmonary disease) ([facility] [address] )    • Dementia ([facility] [address] )    • Essential hypertension    • Hypothyroidism    • 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[facility] [address] )      Past Surgical History:   Procedure Laterality Date   • TONSILLECTOMY     • TOTAL HIP ARTHROPLASTY Right      Family History   Problem Relation Age of Onset   • Diabetes Mother    • Cancer Brother      Social History     Socioeconomic History   • Marital status: /Civil Union     Spouse name: Not on file   • Number of children: Not on file   • Years of education: Not on file   • Highest education level: Not on file   Occupational History   • Not on file   Tobacco Use   • Smoking status: Never   • Smokeless tobacco: Never   Vaping Use   • Vaping Use: Never used   Substance and Sexual Activity   • Alcohol use: Never   • Drug use: No   • Sexual activity: Never   Other Topics Concern   • Not on file   Social History Narrative   • Not on file     Social Determinants of Health     Financial Resource Strain: Not on file   Food Insecurity: Not on file   Transportation Needs: Not on file   Physical Activity: Not on file   Stress: Not on file   Social Connections: Not on file   Intimate Partner Violence: Not on file   Housing Stability: Not on file     Allergies   Allergen Reactions   • Ampicillin Other (See Comments)     per t-system  per t-system  Tolerates cefazolin    • Penicillins        Review of Systems     Denies any pain or shortness of breath  All other review of system negative but history limited due to her dementia       Objective   Vital signs reviewed from facility records  PHYSICAL EXAM:  GENERAL: no acute distress, chronically ill appearing   SKIN: warm, dry, no rash, no cyanosis  HEENT: normocephalic, atraumatic, no JVD, no Thyromegaly, no lymphadenopathy  LUNGS: CTA, no wheezing, no rales, expanded equally, no chest tenderness   HEART: normal rhythm, normal rate, no murmur, no gallop  ABDOMEN: soft non tender non distended bs+, no guarding or rebound tenderness  :  no suprapubic tenderness  MUSCULOSKELETAL: strength about 4+/5 all extremities,  no calf tenderness  NEUROLOGY: awake, alert, stuttered speech, CN2-12 intact  PSYCH: cooperative, pleasant         Pertinent Laboratory/Diagnostic Studies:  Recent labs and diagnostic tests reviewed in nursing home EMR    Current Medications  Medications reviewed with patient/family  Prescriptions provided for medications needed    Prescriptions provided for services needed    Time spend on patient discharge 35 minutes

## 2024-10-08 NOTE — ASSESSMENT & PLAN NOTE
Pt takes Allegra, and is refusing substitution  Non-formulary order entered for her to resume her own medication  [FreeTextEntry1] : Mr. MORALES is a 58-year-old male   former 33 pack year smoker (25-58) with a history of  HLD, Psoriasis, syncope related to bradycardia, heart s/p PPM (1/2024), abnormal coronary CT, anxiety who now comes to the office for an follow up pulmonary evaluation for abnormal CT (+) Sarcoidosis (former 30+ pack year smoker), COPD at risk? seasonal /environmental allergies, poor sleep? ONIEL -- improved    The patient's SOB is felt to be multifactorial: -out of shape/overweight -poor mechanics of breathing -Pulmonary     - (+) sarcoidosis     -COPD at risk (former smoking hx)  -Cardiac ([facility])     -underlying electrical issues     -PPM     -(+) cardiac sarcoidosis   Problem 1: (+) Sarcoidosis-"asymptomatic" C/w Bx -recommend PFT's regularly -recommend ACE levels WNL -recommend hypersensitivity panel WNL  recommend QuantiFERON-TB Gold -recommend Bronchoscopy to evaluate lymph nodes in chest area (EBUS)-Georgina et al -- confirmed -If sarcoidosis is confirmed, PFT's x2 year and ophthalmological evaluate for optical sarcoidosis    Problem 1B: COPD / Asthma (30+ year smoker) - add Ventolin 2 puffs Q6H, pre-exercise -Add Trelegy 200 1 inhalation qD -Inhaler technique reviewed as well as oral hygiene technique reviewed with patient.Avoidance of cold air, extremes of temperature, rescue inhaler should be used before exercise. Order of medication reviewed with patient. Recommended use of a cool mist humidifier in the bedroom.  -COPD is a progressive disease and although it cant be cured, appropriate management can slow its progression, reduce frequency and severity of exacerbations, and improve symptoms and the patient quality of life. Hospitalizations are the greatest contributor to the total COPD costs and account for up to 87% of total COPD related costs. Exacerbations are the main cause of admissions and subsequently account for the 40%-75% of COPD costs. Inhaled maintenance therapy reduces the incidence of exacerbations in patients with stable CPPD. Incorrect inhaler use and nonadherence are major obstacles to achieving COPD treatments goals. Many COPD patients have challenges (impaired inhalation, limited dexterity, reduced cognition: that limit their ability to correctly use their COPD treatment devices resulting in reduced symptom control. Of most importance is smoking cessation and early intervention with respiratory illnesses and contemplation for pulmonary rehab to enhance quality of life.  Problem 2: Abnormal CT -recommend f/u CT scan 3 months from now- 10/2024 (next) -recommend evaluate with Dr. Erickson CAT scans are the only radiological modality to identify abnormalities w/in the lungs with regards to nodules/masses/lymph nodes. Risks, benefits were reviewed in detail. The guidelines for abnormalities include follow up CT scans at various intervals which could range from 6 weeks to 1 year intervals. If there is a change for the worse then consideration for a biopsy will be considered if you are a candidate. Second opinion evaluation with a thoracic surgeon or an interventional radiologist could be offered.  Problem 3: Lung cancer screening (former 30 + year smoker) -recommend LDCT-yearly after formal dx (yearly CT) -Lung cancer screening is recommended for people between the ages of 55 and 80 with prior 30+ pack smoking histories. There is not been irrefutable evidence for realization of lung cancer screening based on two large randomized control trials demonstrating relative reduction in lung cancer mortality for patients undergoing low dose CT scanning. Risks and benefits reviewed with the patient.  Problem 4: Allergy /Sinus-likely -s/p Blood work to include: asthma panel, food IgE panel, IgE level, eosinophil level,vitamin D level (WNL)  -add Olopatadine 0.6% 1 sniff BID  Environmental measures for allergies were encouraged including mattress and pillow cover, air purifier, and environmental controls.   Problem 5: R/O ONIEL (elevated Mallampati class, nocturia, fatigue) -complete home sleep study -Sleep apnea is associated with adverse clinical consequences which can affect most organ systems. Cardiovascular disease risk includes arrhythmias, atrial fibrillation, hypertension, coronary artery disease, and stroke. Metabolic disorders include diabetes type 2, non-alcoholic fatty liver disease. Mood disorder especially depression; and cognitive decline especially in the elderly. Associations with chronic reflux/Barretts esophagus some but not all inclusive. -Reasons include arousal consistent with hypopnea; respiratory events most prominent in REM sleep or supine position; therefore sleep staging and body position are important for accurate diagnosis and estimation of AHI.   Problem 6: Cardiac -recommend Cardiac MRI (pending results) -Recommend cardiac follow up evaluation with cardiologist if needed([facility])   Problem 7: overweight/out of shape -Recommended Emmanuel Delgado's 10-day detox diet and book. - Weight loss, exercise and diet control were discussed and are highly encouraged. Treatment options were given such as aqua therapy, and contacting a nutritionist. Recommended to use the elliptical, stationary bike, less use of treadmill. Mindful eating was explained to the patient. Obesity is associated with worsening asthma, SOB, and potential for cardiac disease, diabetes, and other underlying medical conditions.   Problem 8: Poor mechanics of breathing -Recommended aHroon Choi and Vanessa breathing techniques -Recommended [email or web address] and to YouTube "aerobic exercises for seniors" -Proper breathing techniques were reviewed with an emphasis on exhalation. Patient instructed to breath in for 1 second and out for four seconds. Patient was encouraged not to talk while walking.   Problem 9: Health Maintenance -recommended Sanotize anti viral nasal spray in case of viral infection -s/p flu shot -recommended strep pneumonia vaccines: Prevnar-20 vaccine, follow by Pneumo vaccine 23 one year following -recommended early intervention for URIs -recommended regular osteoporosis evaluations -recommended early dermatological evaluations -recommended after the age of 50 to the age of 70, colonoscopy every 5 years   f/u in 6-8 weeks pt is encouraged to call or fax the office with any questions or concerns.

## 2024-10-15 PROBLEM — M10.9 GOUT: Status: ACTIVE | Noted: 2024-01-03

## 2025-01-21 ENCOUNTER — NURSING HOME VISIT (OUTPATIENT)
Dept: WOUND CARE | Facility: HOSPITAL | Age: 85
End: 2025-01-21
Payer: MEDICARE

## 2025-01-21 DIAGNOSIS — L89.610 PRESSURE INJURY OF RIGHT HEEL, UNSTAGEABLE (HCC): ICD-10-CM

## 2025-01-21 DIAGNOSIS — T14.8XXA DEEP TISSUE INJURY: Primary | ICD-10-CM

## 2025-01-21 DIAGNOSIS — R26.2 AMBULATORY DYSFUNCTION: ICD-10-CM

## 2025-01-21 PROCEDURE — 99305 1ST NF CARE MODERATE MDM 35: CPT | Performed by: NURSE PRACTITIONER

## 2025-01-21 NOTE — PROGRESS NOTES
[facility] WOUND CARE MANAGEMENT   AND HYPERBARIC MEDICINE CENTER       Patient ID: Pauline Allison is a 84 y.o. female Date of Birth 1940     Location of Service: [facility]    Performed wound round with: Wound team     Chief Complaint : Bilateral heels    Wound Instructions:  Wound: Bilateral heels  Discontinue previous wound order  Cleanse the wound bed with normal saline solution wound cleanser  Apply hydraguard to wound bed/skin  Frequency : twice a day and prn for soiling  Offload using pillows  Offload all wounds  Turn and reposition frequently,   Instruct / Assist with weight shifting in chair  Increase protein intake.  Monitor for any sign of infection or worsening, inform PCP or patient's primary physician in your facility.      Allergies  Ampicillin and Penicillins      Assessment & Plan:  1. Deep tissue injury  Assessment & Plan:  Left heel  Purple, nonblanchable, with no obvious sign of infection  Local wound care with hydraguard  Offload using pillow, patient cannot use heel boots, high risk for fall  Follow-up next week  2. Pressure injury of right heel, unstageable (HCC)  Assessment & Plan:  Right heel  100% eschar, with no obvious sign of infection  Local wound care with hydraguard  Offload using pillow, patient cannot use heel boots, high risk for fall  Follow-up next week  3. Ambulatory dysfunction  Assessment & Plan:  On long-term care           Subjective:   January 21, 2025.  New consult for wound on the bilateral heels.  Patient was referred by geriatric team.  Medical problem includes dementia and ambulatory dysfunction.  Patient was seen with the facility wound team.    Wound history: As per medical record review, patient was seen by [facility] wound healing team on January 16, 2025.  There is no mention of wound on the bilateral heels.    Received patient in bed, confused, needs assistance with turning and repositioning in bed.  Patient currently  wheelchair-bound.        Review of Systems   Unable to perform ROS: Dementia       Objective:    Physical Exam  Constitutional:       Appearance: Normal appearance.   Cardiovascular:      Rate and Rhythm: Normal rate.   Pulmonary:      Effort: Pulmonary effort is normal.   Genitourinary:     Comments: Incontinent  Musculoskeletal:      Right lower leg: Edema present.      Left lower leg: Edema present.      Comments: Limited range of motion  Positive pedal pulses   Skin:     Findings: Lesion present.      Comments: Right heel: Wound size is 2.2 x 2.1 cm.,  100% eschar, no drainage, no obvious sign of infection    Left heel: Wound size is 3.3 x 3.3 cm.,  Purple, no drainage, no obvious sign of infection   Neurological:      Mental Status: She is alert.              Procedures           Patient's care was coordinated with nursing facility staff. Recent vitals, labs and updated medications were reviewed on EMR or chart system of facility. Past Medical, surgical, social, medication and allergy history and patient's previous records were reviewed and updated as appropriate: Most up-to date information is available in the facility EMR where the patient is currently admitted.    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[facility])    Dementia ([facility])    Gait abnormality    History of Clostridium difficile infection    Menopause    Osteopenia    Thickened endometrium    Lithium toxicity    Abdominal distension    Medical clearance for psychiatric admission    Ambulatory dysfunction    Cognitive communication deficit    Diarrhea, unspecified    Dysphagia, unspecified    Enterocolitis    Generalized  muscle weakness    Hypokalemia    Hyponatremia    Anemia    Gout    Deep tissue injury    Pressure injury of right heel, unstageable (HCC)     Past Medical History:   Diagnosis Date    Bipolar depression (HCC)     COPD (chronic obstructive pulmonary disease) (HCC)     Dementia (HCC)     Essential hypertension     Hypothyroidism     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HCC)      Past Surgical History:   Procedure Laterality Date    COLONOSCOPY  08/05/2015    RENAE Owen MD.- Diffuse mucosal colitis. Bx:colonic mucosa showing mild, acute/active colitis; no granulomata nor dysplasia.    [facility] Bx: Microscopic colitis.    TONSILLECTOMY      TOTAL HIP ARTHROPLASTY Right      Social History     Socioeconomic History    Marital status: /Civil Union     Spouse name: None    Number of children: None    Years of education: None    Highest education level: None   Occupational History    None   Tobacco Use    Smoking status: Never    Smokeless tobacco: Never   Vaping Use    Vaping status: Never Used   Substance and Sexual Activity    Alcohol use: Never    Drug use: No    Sexual activity: Never   Other Topics Concern    None   Social History Narrative    None     Social Drivers of Health     Financial Resource Strain: Patient Unable To Answer (1/15/2025)    Received from [facility]    Overall Financial Resource Strain (CARDIA)     Difficulty of Paying Living Expenses: Patient unable to answer   Food Insecurity: Patient Unable To Answer (1/15/2025)    Received from [facility]    Hunger Vital Sign     Worried About Running Out of Food in the Last Year: Patient unable to answer     Ran Out of Food in the Last Year: Patient unable to answer   Transportation Needs: Patient Declined (1/15/2025)    Received from [facility]    PRAPARE - Transportation     Lack of Transportation (Medical): Patient declined     Lack of  Transportation (Non-Medical): Patient declined   Physical Activity: Not on file   Stress: Not on file   Social Connections: Not on file   Intimate Partner Violence: Patient Unable To Answer (1/15/2025)    Received from [facility]    Humiliation, Afraid, Rape, and Kick questionnaire     Fear of Current or Ex-Partner: Patient unable to answer     Emotionally Abused: Patient unable to answer     Physically Abused: Patient unable to answer     Sexually Abused: Patient unable to answer   Housing Stability: Unknown (1/15/2025)    Received from [facility]    Housing Stability Vital Sign     Unable to Pay for Housing in the Last Year: Patient declined     Number of Times Moved in the Last Year: 0     Homeless in the Last Year: Patient unable to answer        Current Outpatient Medications:     BIOTIN PO, Take by mouth, Disp: , Rfl:     divalproex sodium (DEPAKOTE ER) 250 mg 24 hr tablet, Take 1 tablet by mouth in the morning, Disp: , Rfl:     divalproex sodium (DEPAKOTE ER) 500 mg 24 hr tablet, Take 500 mg by mouth daily at bedtime, Disp: , Rfl:     levothyroxine 25 mcg tablet, Take 1 tablet (25 mcg total) by mouth every other day, Disp: 15 tablet, Rfl: 0    levothyroxine 50 mcg tablet, Take 1 tablet (50 mcg total) by mouth every other day, Disp: 15 tablet, Rfl: 0    loperamide (Anti-Diarrheal) 2 MG tablet, Take 2 mg by mouth 4 (four) times a day as needed for diarrhea, Disp: , Rfl:     LORazepam (ATIVAN) 0.5 mg tablet, Take 0.5 mg by mouth daily as needed, Disp: , Rfl:     melatonin 3 mg, Take 3 mg by mouth daily at bedtime, Disp: , Rfl:     metoprolol succinate (TOPROL-XL) 50 mg 24 hr tablet, Take 1 tablet by mouth in the morning, Disp: , Rfl:     pantoprazole (PROTONIX) 20 mg tablet, Take 1 tablet (20 mg total) by mouth daily, Disp: 30 tablet, Rfl: 2  Family History   Problem Relation Age of Onset    Diabetes Mother     Cancer Brother               Coordination of Care: Wound team  "aware of the treatment plan. Facility nurse will provide wound treatment and monitor the wound for any changes.     Patient / Staff education : Patient / Staff was given education on sign of infection and pressure ulcer prevention. Patient/ Staff verbalized understanding     Follow up :  Next week    Voice-recognition software may have been used in the preparation of this document. Occasional wrong word or \"sound-alike\" substitutions may have occurred due to the inherent limitations of voice recognition software. Interpretation should be guided by context.      KESHAV العراقي  "

## 2025-01-22 PROBLEM — T14.8XXA DEEP TISSUE INJURY: Status: ACTIVE | Noted: 2025-01-22

## 2025-01-22 PROBLEM — L89.610 PRESSURE INJURY OF RIGHT HEEL, UNSTAGEABLE (HCC): Status: ACTIVE | Noted: 2025-01-22

## 2025-01-22 NOTE — ASSESSMENT & PLAN NOTE
Left heel  Purple, nonblanchable, with no obvious sign of infection  Local wound care with hydraguard  Offload using pillow, patient cannot use heel boots, high risk for fall  Follow-up next week

## 2025-01-22 NOTE — ASSESSMENT & PLAN NOTE
Right heel  100% eschar, with no obvious sign of infection  Local wound care with hydraguard  Offload using pillow, patient cannot use heel boots, high risk for fall  Follow-up next week

## 2025-01-28 ENCOUNTER — NURSING HOME VISIT (OUTPATIENT)
Dept: WOUND CARE | Facility: HOSPITAL | Age: 85
End: 2025-01-28
Payer: MEDICARE

## 2025-01-28 DIAGNOSIS — T14.8XXA DEEP TISSUE INJURY: Primary | ICD-10-CM

## 2025-01-28 DIAGNOSIS — L89.610 PRESSURE INJURY OF RIGHT HEEL, UNSTAGEABLE (HCC): ICD-10-CM

## 2025-01-28 PROCEDURE — 99307 SBSQ NF CARE SF MDM 10: CPT | Performed by: NURSE PRACTITIONER

## 2025-01-29 NOTE — PROGRESS NOTES
[facility] WOUND CARE MANAGEMENT   AND HYPERBARIC MEDICINE CENTER       Patient ID: Pauline Allison is a 84 y.o. female Date of Birth 1940     Location of Service: [facility]    Performed wound round with: Wound team     Chief Complaint : Bilateral heels    Wound Instructions:  Wound: Bilateral heels  Discontinue previous wound order  Apply hydraguard or moisturizing lotion to skin  Frequency : twice a day and prn for soiling  Offload using pillows  Offload all wounds  Turn and reposition frequently,   Instruct / Assist with weight shifting in chair  Increase protein intake.  Monitor for any sign of infection or worsening, inform PCP or patient's primary physician in your facility.      Allergies  Ampicillin and Penicillins      Assessment & Plan:  1. Deep tissue injury  Assessment & Plan:  Location : Left heel  Wound is healed  Hydraguard for skin dryness  Continue to ollfoad  Sign off. Facility staff will continue to provide treatment and monitor the wound. Can reconsult wound nurse practitioner if wound failed to heal or worsened.     2. Pressure injury of right heel, unstageable (HCC)  Assessment & Plan:  Location : Right heel  Wound is healed  Hydraguard for skin dryness  Continue to ollfoad  Sign off. Facility staff will continue to provide treatment and monitor the wound. Can reconsult wound nurse practitioner if wound failed to heal or worsened.                Subjective:   January 21, 2025.  New consult for wound on the bilateral heels.  Patient was referred by geriatric team.  Medical problem includes dementia and ambulatory dysfunction.  Patient was seen with the facility wound team.    Wound history: As per medical record review, patient was seen by [facility] wound healing team on January 16, 2025.  There is no mention of wound on the bilateral heels.    Received patient in bed, confused, needs assistance with turning and repositioning in bed.  Patient currently  wheelchair-bound.    1/28/2025 Follow up for wound on the bilateral heels . Received patient, not in distress. Facility staff did not report any significant issues related to the wound.           Review of Systems   Unable to perform ROS: Dementia       Objective:    Physical Exam  Constitutional:       Appearance: Normal appearance.   Cardiovascular:      Rate and Rhythm: Normal rate.   Pulmonary:      Effort: Pulmonary effort is normal.   Genitourinary:     Comments: Incontinent  Musculoskeletal:      Right lower leg: Edema present.      Left lower leg: Edema present.      Comments: Limited range of motion  Positive pedal pulses   Skin:     Findings: No lesion.      Comments: Right heel and left heel wounds are all healed   Neurological:      Mental Status: She is alert.              Procedures           Patient's care was coordinated with nursing facility staff. Recent vitals, labs and updated medications were reviewed on EMR or chart system of facility. Past Medical, surgical, social, medication and allergy history and patient's previous records were reviewed and updated as appropriate: Most up-to date information is available in the facility EMR where the patient is currently admitted.    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[facility])    Dementia ([facility])    Gait abnormality    History of Clostridium difficile infection    Menopause    Osteopenia    Thickened endometrium    Lithium toxicity    Abdominal distension    Medical clearance for psychiatric admission    Ambulatory dysfunction    Cognitive communication deficit    Diarrhea, unspecified    Dysphagia, unspecified     Enterocolitis    Generalized muscle weakness    Hypokalemia    Hyponatremia    Anemia    Gout    Deep tissue injury    Pressure injury of right heel, unstageable (HCC)     Past Medical History:   Diagnosis Date    Bipolar depression (HCC)     COPD (chronic obstructive pulmonary disease) (HCC)     Dementia (HCC)     Essential hypertension     Hypothyroidism     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HCC)      Past Surgical History:   Procedure Laterality Date    COLONOSCOPY  08/05/2015    RENAE Owen MD.- Diffuse mucosal colitis. Bx:colonic mucosa showing mild, acute/active colitis; no granulomata nor dysplasia.    FLEXIBLE SIGMOIDOSCOPY  [facility]. Bx: Microscopic colitis.    TONSILLECTOMY      TOTAL HIP ARTHROPLASTY Right      Social History     Socioeconomic History    Marital status: /Civil Union     Spouse name: None    Number of children: None    Years of education: None    Highest education level: None   Occupational History    None   Tobacco Use    Smoking status: Never    Smokeless tobacco: Never   Vaping Use    Vaping status: Never Used   Substance and Sexual Activity    Alcohol use: Never    Drug use: No    Sexual activity: Never   Other Topics Concern    None   Social History Narrative    None     Social Drivers of Health     Financial Resource Strain: Patient Unable To Answer (1/15/2025)    Received from [facility]    Overall Financial Resource Strain (CARDIA)     Difficulty of Paying Living Expenses: Patient unable to answer   Food Insecurity: Patient Unable To Answer (1/15/2025)    Received from [facility]    Hunger Vital Sign     Worried About Running Out of Food in the Last Year: Patient unable to answer     Ran Out of Food in the Last Year: Patient unable to answer   Transportation Needs: Patient Declined (1/15/2025)    Received from [facility]    PRAPARE - Transportation     Lack of Transportation (Medical): Patient  declined     Lack of Transportation (Non-Medical): Patient declined   Physical Activity: Not on file   Stress: Not on file   Social Connections: Not on file   Intimate Partner Violence: Patient Unable To Answer (1/15/2025)    Received from [facility]    Humiliation, Afraid, Rape, and Kick questionnaire     Fear of Current or Ex-Partner: Patient unable to answer     Emotionally Abused: Patient unable to answer     Physically Abused: Patient unable to answer     Sexually Abused: Patient unable to answer   Housing Stability: Unknown (1/15/2025)    Received from [facility]    Housing Stability Vital Sign     Unable to Pay for Housing in the Last Year: Patient declined     Number of Times Moved in the Last Year: 0     Homeless in the Last Year: Patient unable to answer        Current Outpatient Medications:     BIOTIN PO, Take by mouth, Disp: , Rfl:     divalproex sodium (DEPAKOTE ER) 250 mg 24 hr tablet, Take 1 tablet by mouth in the morning, Disp: , Rfl:     divalproex sodium (DEPAKOTE ER) 500 mg 24 hr tablet, Take 500 mg by mouth daily at bedtime, Disp: , Rfl:     levothyroxine 25 mcg tablet, Take 1 tablet (25 mcg total) by mouth every other day, Disp: 15 tablet, Rfl: 0    levothyroxine 50 mcg tablet, Take 1 tablet (50 mcg total) by mouth every other day, Disp: 15 tablet, Rfl: 0    loperamide (Anti-Diarrheal) 2 MG tablet, Take 2 mg by mouth 4 (four) times a day as needed for diarrhea, Disp: , Rfl:     LORazepam (ATIVAN) 0.5 mg tablet, Take 0.5 mg by mouth daily as needed, Disp: , Rfl:     melatonin 3 mg, Take 3 mg by mouth daily at bedtime, Disp: , Rfl:     metoprolol succinate (TOPROL-XL) 50 mg 24 hr tablet, Take 1 tablet by mouth in the morning, Disp: , Rfl:     pantoprazole (PROTONIX) 20 mg tablet, Take 1 tablet (20 mg total) by mouth daily, Disp: 30 tablet, Rfl: 2  Family History   Problem Relation Age of Onset    Diabetes Mother     Cancer Brother               Coordination of  "Care: Wound team aware of the treatment plan. Facility nurse will provide wound treatment and monitor the wound for any changes.     Patient / Staff education : Patient / Staff was given education on sign of infection and pressure ulcer prevention. Patient/ Staff verbalized understanding     Follow up :  Sign off. Facility staff will continue to provide treatment and monitor the wound. Can reconsult wound nurse practitioner if wound failed to heal or worsened.       Voice-recognition software may have been used in the preparation of this document. Occasional wrong word or \"sound-alike\" substitutions may have occurred due to the inherent limitations of voice recognition software. Interpretation should be guided by context.      KESHAV العراقي  "

## 2025-01-29 NOTE — ASSESSMENT & PLAN NOTE
Location : Left heel  Wound is healed  Hydraguard for skin dryness  Continue to ollfoad  Sign off. Facility staff will continue to provide treatment and monitor the wound. Can reconsult wound nurse practitioner if wound failed to heal or worsened.

## 2025-01-29 NOTE — ASSESSMENT & PLAN NOTE
Location : Right heel  Wound is healed  Hydraguard for skin dryness  Continue to ollfoad  Sign off. Facility staff will continue to provide treatment and monitor the wound. Can reconsult wound nurse practitioner if wound failed to heal or worsened.